# Patient Record
Sex: FEMALE | Race: WHITE | Employment: OTHER | URBAN - METROPOLITAN AREA
[De-identification: names, ages, dates, MRNs, and addresses within clinical notes are randomized per-mention and may not be internally consistent; named-entity substitution may affect disease eponyms.]

---

## 2018-03-02 ENCOUNTER — ANESTHESIA EVENT (OUTPATIENT)
Dept: ENDOSCOPY | Age: 78
End: 2018-03-02
Payer: MEDICARE

## 2018-03-02 RX ORDER — MELOXICAM 15 MG/1
TABLET ORAL
Refills: 3 | COMMUNITY
Start: 2018-02-01

## 2018-03-02 RX ORDER — CIPROFLOXACIN 500 MG/1
TABLET ORAL
COMMUNITY
Start: 2018-02-28 | End: 2018-03-14

## 2018-03-05 ENCOUNTER — ANESTHESIA (OUTPATIENT)
Dept: ENDOSCOPY | Age: 78
End: 2018-03-05
Payer: MEDICARE

## 2018-03-05 ENCOUNTER — HOSPITAL ENCOUNTER (OUTPATIENT)
Age: 78
Setting detail: OUTPATIENT SURGERY
Discharge: HOME | End: 2018-03-05
Attending: INTERNAL MEDICINE | Admitting: INTERNAL MEDICINE
Payer: MEDICARE

## 2018-03-05 VITALS
HEIGHT: 63 IN | WEIGHT: 121 LBS | RESPIRATION RATE: 14 BRPM | SYSTOLIC BLOOD PRESSURE: 105 MMHG | BODY MASS INDEX: 21.44 KG/M2 | DIASTOLIC BLOOD PRESSURE: 55 MMHG | TEMPERATURE: 98.2 F | HEART RATE: 87 BPM | OXYGEN SATURATION: 99 %

## 2018-03-05 PROCEDURE — 88305 TISSUE EXAM BY PATHOLOGIST: CPT | Performed by: INTERNAL MEDICINE

## 2018-03-05 PROCEDURE — 74011250636 HC RX REV CODE- 250/636

## 2018-03-05 PROCEDURE — 77030038604 HC SNR ENDO EXACTO USEN -B: Performed by: INTERNAL MEDICINE

## 2018-03-05 PROCEDURE — 74011000250 HC RX REV CODE- 250

## 2018-03-05 PROCEDURE — 76040000007: Performed by: INTERNAL MEDICINE

## 2018-03-05 PROCEDURE — 74011250636 HC RX REV CODE- 250/636: Performed by: NURSE ANESTHETIST, CERTIFIED REGISTERED

## 2018-03-05 PROCEDURE — 76060000032 HC ANESTHESIA 0.5 TO 1 HR: Performed by: INTERNAL MEDICINE

## 2018-03-05 RX ORDER — SODIUM CHLORIDE, SODIUM LACTATE, POTASSIUM CHLORIDE, CALCIUM CHLORIDE 600; 310; 30; 20 MG/100ML; MG/100ML; MG/100ML; MG/100ML
100 INJECTION, SOLUTION INTRAVENOUS CONTINUOUS
Status: DISCONTINUED | OUTPATIENT
Start: 2018-03-05 | End: 2018-03-05 | Stop reason: HOSPADM

## 2018-03-05 RX ORDER — SODIUM CHLORIDE 0.9 % (FLUSH) 0.9 %
5-10 SYRINGE (ML) INJECTION EVERY 8 HOURS
Status: CANCELLED | OUTPATIENT
Start: 2018-03-05 | End: 2018-03-05

## 2018-03-05 RX ORDER — SODIUM CHLORIDE 0.9 % (FLUSH) 0.9 %
5-10 SYRINGE (ML) INJECTION AS NEEDED
Status: CANCELLED | OUTPATIENT
Start: 2018-03-05 | End: 2018-03-05

## 2018-03-05 RX ORDER — DEXTROMETHORPHAN/PSEUDOEPHED 2.5-7.5/.8
1.2 DROPS ORAL
Status: CANCELLED | OUTPATIENT
Start: 2018-03-05

## 2018-03-05 RX ORDER — LIDOCAINE HYDROCHLORIDE 20 MG/ML
INJECTION, SOLUTION EPIDURAL; INFILTRATION; INTRACAUDAL; PERINEURAL AS NEEDED
Status: DISCONTINUED | OUTPATIENT
Start: 2018-03-05 | End: 2018-03-05 | Stop reason: HOSPADM

## 2018-03-05 RX ORDER — PROPOFOL 10 MG/ML
INJECTION, EMULSION INTRAVENOUS AS NEEDED
Status: DISCONTINUED | OUTPATIENT
Start: 2018-03-05 | End: 2018-03-05 | Stop reason: HOSPADM

## 2018-03-05 RX ADMIN — PROPOFOL 50 MG: 10 INJECTION, EMULSION INTRAVENOUS at 09:18

## 2018-03-05 RX ADMIN — PROPOFOL 50 MG: 10 INJECTION, EMULSION INTRAVENOUS at 09:12

## 2018-03-05 RX ADMIN — PROPOFOL 30 MG: 10 INJECTION, EMULSION INTRAVENOUS at 09:22

## 2018-03-05 RX ADMIN — LIDOCAINE HYDROCHLORIDE 40 MG: 20 INJECTION, SOLUTION EPIDURAL; INFILTRATION; INTRACAUDAL; PERINEURAL at 08:56

## 2018-03-05 RX ADMIN — PROPOFOL 30 MG: 10 INJECTION, EMULSION INTRAVENOUS at 09:01

## 2018-03-05 RX ADMIN — PROPOFOL 50 MG: 10 INJECTION, EMULSION INTRAVENOUS at 09:07

## 2018-03-05 RX ADMIN — PROPOFOL 20 MG: 10 INJECTION, EMULSION INTRAVENOUS at 09:04

## 2018-03-05 RX ADMIN — PROPOFOL 50 MG: 10 INJECTION, EMULSION INTRAVENOUS at 08:56

## 2018-03-05 RX ADMIN — SODIUM CHLORIDE, SODIUM LACTATE, POTASSIUM CHLORIDE, AND CALCIUM CHLORIDE: 600; 310; 30; 20 INJECTION, SOLUTION INTRAVENOUS at 08:52

## 2018-03-05 NOTE — H&P
Date of Surgery Update:  Evelia Villarreal was seen and examined. History and physical has been reviewed. The patient has been examined.  There have been no significant clinical changes since the completion of the originally dated History and Physical.    Signed By: Bryce Freitas MD     March 5, 2018 8:47 AM

## 2018-03-05 NOTE — ANESTHESIA PREPROCEDURE EVALUATION
Anesthetic History   No history of anesthetic complications            Review of Systems / Medical History  Patient summary reviewed and pertinent labs reviewed    Pulmonary  Within defined limits                 Neuro/Psych   Within defined limits           Cardiovascular  Within defined limits                Exercise tolerance: >4 METS     GI/Hepatic/Renal  Within defined limits              Endo/Other      Hypothyroidism: well controlled  Arthritis     Other Findings   Comments: Documentation of current medication  Current medications obtained, documented and obtained? YES      Risk Factors for Postoperative nausea/vomiting:       History of postoperative nausea/vomiting? NO       Female? YES       Motion sickness? NO       Intended opioid administration for postoperative analgesia? NO      Smoking Abstinence:  Current Smoker? NO  Elective Surgery? YES  Seen preoperatively by anesthesiologist or proxy prior to day of surgery? YES  Pt abstained from smoking 24 hours prior to anesthesia?  N/A    Preventive care/screening for High Blood Pressure:  Aged 18 years and older: YES  Screened for high blood pressure: YES  Patients with high blood pressure referred to primary care provider   for BP management: YES                 Physical Exam    Airway  Mallampati: II  TM Distance: 4 - 6 cm  Neck ROM: normal range of motion   Mouth opening: Normal     Cardiovascular  Regular rate and rhythm,  S1 and S2 normal,  no murmur, click, rub, or gallop  Rhythm: regular  Rate: normal         Dental  No notable dental hx       Pulmonary  Breath sounds clear to auscultation               Abdominal  GI exam deferred       Other Findings            Anesthetic Plan    ASA: 2  Anesthesia type: MAC          Induction: Intravenous  Anesthetic plan and risks discussed with: Patient

## 2018-03-05 NOTE — IP AVS SNAPSHOT
Gilberto Bryant 
 
 
 [address] 
[phone] Patient: Kade Arechiga MRN: [id number] TPO:1/52/8339 About your hospitalization You were admitted on:  March 5, 2018 You last received care in the:  [facility] PHASE 2 RECOVERY You were discharged on:  March 5, 2018 Why you were hospitalized Your primary diagnosis was:  Not on File Follow-up Information Follow up With Details Comments Contact Info Stacy Serrano MD   [address] [place] [address] [phone] Your Scheduled Appointments Wednesday March 14, 2018  8:45 AM EDT  
ESTABLISHED PATIENT with Rusty Chaudhry MD  
[facility]. [address] ([facility]) [address] [place] [address]  
[phone] Discharge Orders None A check roly indicates which time of day the medication should be taken. My Medications CONTINUE taking these medications Instructions Each Dose to Equal  
 Morning Noon Evening Bedtime  
 ciprofloxacin HCl 500 mg tablet Commonly known as:  CIPRO Your last dose was: Your next dose is:    
   
   
      
   
   
   
  
 estradiol 0.01 % (0.1 mg/gram) vaginal cream  
Commonly known as:  ESTRACE Your last dose was: Your next dose is:    
   
   
 Apply to affected area 2 x per week at bedtime LEXAPRO 20 mg tablet Generic drug:  escitalopram oxalate Your last dose was: Your next dose is: Take 20 mg by mouth daily. 20 mg  
    
   
   
   
  
 meloxicam 15 mg tablet Commonly known as:  MOBIC Your last dose was: Your next dose is:    
   
   
      
   
   
   
  
 MYRBETRIQ 50 mg ER tablet Generic drug:  mirabegron ER Your last dose was: Your next dose is: TAKE 1 TAB BY MOUTH DAILY. nabumetone 750 mg tablet Commonly known as:  RELAFEN Your last dose was: Your next dose is: Take 750 mg by mouth two (2) times a day. 750 mg Omega-3-DHA-EPA-Fish Oil 1,000 mg (120 mg-180 mg) Cap Your last dose was: Your next dose is: Take  by mouth. RESTORIL 30 mg capsule Generic drug:  temazepam  
   
Your last dose was: Your next dose is: Take  by mouth nightly as needed. SYNTHROID 100 mcg tablet Generic drug:  levothyroxine Your last dose was: Your next dose is: Take  by mouth Daily (before breakfast). ZOCOR PO Your last dose was: Your next dose is: Take  by mouth. Discharge Instructions For the next 12 hours you should not: 1. drive 2. drink alcohol 3. operate any machinery 4. engage in activities that require mental sharpness or manual dexterity such as   
 cooking 5. take any drugs other than those prescribed by a physician 6. make any legal or financial decisions Call your doctor's office immediately, if: 
 1. increased and continuing rectal bleeding 2. fever 3. increased abdominal pain Take it easy today and resume normal activity tomorrow. Resume previous diet. DISCHARGE SUMMARY from Nurse PATIENT INSTRUCTIONS: 
 
After general anesthesia or intravenous sedation, for 24 hours or while taking prescription Narcotics: · Limit your activities · Do not drive and operate hazardous machinery · Do not make important personal or business decisions · Do  not drink alcoholic beverages · If you have not urinated within 8 hours after discharge, please contact your surgeon on call. Report the following to your surgeon: 
· Excessive pain, swelling, redness or odor of or around the surgical area · Temperature over 100.5 · Nausea and vomiting lasting longer than 4 hours or if unable to take medications · Any signs of decreased circulation or nerve impairment to extremity: change in color, persistent  numbness, tingling, coldness or increase pain · Any questions What to do at Home: These are general instructions for a healthy lifestyle: No smoking/ No tobacco products/ Avoid exposure to second hand smoke Surgeon General's Warning:  Quitting smoking now greatly reduces serious risk to your health. Obesity, smoking, and sedentary lifestyle greatly increases your risk for illness A healthy diet, regular physical exercise & weight monitoring are important for maintaining a healthy lifestyle You may be retaining fluid if you have a history of heart failure or if you experience any of the following symptoms:  Weight gain of 3 pounds or more overnight or 5 pounds in a week, increased swelling in our hands or feet or shortness of breath while lying flat in bed. Please call your doctor as soon as you notice any of these symptoms; do not wait until your next office visit. Recognize signs and symptoms of STROKE: 
 
F-face looks uneven A-arms unable to move or move unevenly S-speech slurred or non-existent T-time-call 911 as soon as signs and symptoms begin-DO NOT go Back to bed or wait to see if you get better-TIME IS BRAIN. Warning Signs of HEART ATTACK Call 911 if you have these symptoms: 
? Chest discomfort. Most heart attacks involve discomfort in the center of the chest that lasts more than a few minutes, or that goes away and comes back. It can feel like uncomfortable pressure, squeezing, fullness, or pain. ? Discomfort in other areas of the upper body. Symptoms can include pain or discomfort in one or both arms, the back, neck, jaw, or stomach. ? Shortness of breath with or without chest discomfort. ? Other signs may include breaking out in a cold sweat, nausea, or lightheadedness. Don't wait more than five minutes to call [address]! Fast action can save your life. Calling 911 is almost always the fastest way to get lifesaving treatment. Emergency Medical Services staff can begin treatment when they arrive  up to an hour sooner than if someone gets to the hospital by car. The discharge information has been reviewed with the patient. The patient verbalized understanding. Discharge medications reviewed with the patient and appropriate educational materials and side effects teaching were provided. ___________________________________________________________________________________________________________________________________ Patient armband removed and given to patient to take home. Patient was informed of the privacy risks if armband lost or stolen Introducing [facility]! [employer] introduces [employer] patient portal. Now you can access parts of your medical record, email your doctor's office, and request medication refills online. 1. In your internet browser, go to [email or web address] 2. Click on the First Time User? Click Here link in the Sign In box. You will see the New Member Sign Up page. 3. Enter your Trust[employer] Access Code exactly as it appears below. You will not need to use this code after youve completed the sign-up process. If you do not sign up before the expiration date, you must request a new code. · Trust[employer] Access Code: [email or web address] Expires: 5/31/2018  2:05 PM 
 
4. Enter the last four digits of your Social Security Number (xxxx) and Date of Birth (mm/dd/yyyy) as indicated and click Submit. You will be taken to the next sign-up page. 5. Create a [employer] ID. This will be your [employer] login ID and cannot be changed, so think of one that is secure and easy to remember. 6. Create a TrustCloud password. You can change your password at any time. 7. Enter your Password Reset Question and Answer. This can be used at a later time if you forget your password. 8. Enter your e-mail address. You will receive e-mail notification when new information is available in [address] 9. Click Sign Up. You can now view and download portions of your medical record. 10. Click the Download Summary menu link to download a portable copy of your medical information. If you have questions, please visit the Frequently Asked Questions section of the "[employer] website. Remember, "[employer] is NOT to be used for urgent needs. For medical emergencies, dial 911. Now available from your iPhone and Android! Providers Seen During Your Hospitalization Provider Specialty Primary office phone Bushra Emanuel MD Gastroenterology [phone] Your Primary Care Physician (PCP) Primary Care Physician Office Phone Office Fax Liane Slois [phone] You are allergic to the following Allergen Reactions Sulfa (Sulfonamide Antibiotics) Itching Recent Documentation Height Weight BMI OB Status Smoking Status 1.6 m 54.9 kg 21.43 kg/m2 Hysterectomy Never Smoker Emergency Contacts Name Discharge Info Relation Home Work Mobile [employer] DISCHARGE CAREGIVER [3] Spouse [3] [phone] Patient Belongings The following personal items are in your possession at time of discharge: 
  Dental Appliances: None  Visual Aid: None Please provide this summary of care documentation to your next provider. Signatures-by signing, you are acknowledging that this After Visit Summary has been reviewed with you and you have received a copy. Patient Signature:  ____________________________________________________________ Date:  ____________________________________________________________  
  
Natalia Sleeper  Provider Signature: ____________________________________________________________ Date:  ____________________________________________________________

## 2018-03-05 NOTE — DISCHARGE INSTRUCTIONS
For the next 12 hours you should not:   1. drive   2. drink alcohol   3. operate any machinery   4. engage in activities that require mental sharpness or manual dexterity such as     cooking   5. take any drugs other than those prescribed by a physician   6. make any legal or financial decisions  Call your doctor's office immediately, if:   1. increased and continuing rectal bleeding   2. fever   3. increased abdominal pain    Take it easy today and resume normal activity tomorrow. Resume previous diet. DISCHARGE SUMMARY from Nurse    PATIENT INSTRUCTIONS:    After general anesthesia or intravenous sedation, for 24 hours or while taking prescription Narcotics:  · Limit your activities  · Do not drive and operate hazardous machinery  · Do not make important personal or business decisions  · Do  not drink alcoholic beverages  · If you have not urinated within 8 hours after discharge, please contact your surgeon on call. Report the following to your surgeon:  · Excessive pain, swelling, redness or odor of or around the surgical area  · Temperature over 100.5  · Nausea and vomiting lasting longer than 4 hours or if unable to take medications  · Any signs of decreased circulation or nerve impairment to extremity: change in color, persistent  numbness, tingling, coldness or increase pain  · Any questions    What to do at Home:  These are general instructions for a healthy lifestyle:    No smoking/ No tobacco products/ Avoid exposure to second hand smoke  Surgeon General's Warning:  Quitting smoking now greatly reduces serious risk to your health.     Obesity, smoking, and sedentary lifestyle greatly increases your risk for illness    A healthy diet, regular physical exercise & weight monitoring are important for maintaining a healthy lifestyle    You may be retaining fluid if you have a history of heart failure or if you experience any of the following symptoms:  Weight gain of 3 pounds or more overnight or 5 pounds in a week, increased swelling in our hands or feet or shortness of breath while lying flat in bed. Please call your doctor as soon as you notice any of these symptoms; do not wait until your next office visit. Recognize signs and symptoms of STROKE:    F-face looks uneven    A-arms unable to move or move unevenly    S-speech slurred or non-existent    T-time-call 911 as soon as signs and symptoms begin-DO NOT go       Back to bed or wait to see if you get better-TIME IS BRAIN. Warning Signs of HEART ATTACK     Call 911 if you have these symptoms:   Chest discomfort. Most heart attacks involve discomfort in the center of the chest that lasts more than a few minutes, or that goes away and comes back. It can feel like uncomfortable pressure, squeezing, fullness, or pain.  Discomfort in other areas of the upper body. Symptoms can include pain or discomfort in one or both arms, the back, neck, jaw, or stomach.  Shortness of breath with or without chest discomfort.  Other signs may include breaking out in a cold sweat, nausea, or lightheadedness. Don't wait more than five minutes to call 911 - MINUTES MATTER! Fast action can save your life. Calling 911 is almost always the fastest way to get lifesaving treatment. Emergency Medical Services staff can begin treatment when they arrive -- up to an hour sooner than if someone gets to the hospital by car. The discharge information has been reviewed with the patient. The patient verbalized understanding. Discharge medications reviewed with the patient and appropriate educational materials and side effects teaching were provided. ___________________________________________________________________________________________________________________________________  Patient armband removed and given to patient to take home.   Patient was informed of the privacy risks if armband lost or stolen

## 2018-03-05 NOTE — PROCEDURES
Colonoscopy Report    Patient: Guy Valentin MRN: [id number]  SSN: [id number]    YOB: 1940  Age: 69 y.o. Sex: female      Date of Procedure: 3/5/2018    IMPRESSION:  1. 6 mm polyp vs. granulation tissue at 15 cm -snared off  2. diverticulosis,  Moderate in degree, involving the descending colon and the sigmoid  3. normal colonic mucosa throughout  4. Normal terminal ileum    RECOMMENDATIONS:  1. Await pathology. 2. Adenoma is present, repeat colonoscopy in 5 years. 3. Call in 2 weeks    Indication:  Constipation, Screening colonoscopy  Procedure Performed: Colonoscopy polypectomy (cold snare)  Endoscopist: Catalino Shook MD  Assistant: Endoscopy Technician-1: Shanell [facility]  Endoscopy RN-1: Wally Hector RN  ASA: ASA 2 - Patient with mild systemic disease with no functional limitations  Mallampati Score: II (soft palate, uvula, fauces visible)  Anesthesia: MAC anesthesia  Endoscope: [id number]  Extent of Examination:terminal ileum  Quality of Preparation:     []  Excellent   [x]  Very Good   [] Fair but adequate   [] Fair, inadequate   []  Poor      Technique: The procedure was discussed with the patient including risks, benefits, alternatives including risks of IV sedation, bleeding, perforation and missed polyp. A safety timeout was performed. The patient was given incremental doses of Versed and Demerol to achieve moderate conscious sedation. The patients vital signs were monitored at all times including heart rate, rhythm, blood pressure and oxygen saturation. The patient was placed in left lateral position. When adequate sedation was achieved a perianal inspection and a digital rectal exam were performed. Video colonoscope was introduced into the rectum and advanced through a tortuous colon under direct vision up to the terminal ileum. The cecum was identified by IC valve, appendiceal orifice and crows foot.  With adequate insufflation and maneuvering of the withdrawing scope, the colonic mucosa was visualized carefully. Retroflexion was performed in the rectum and the distal rectum visualized. The patient tolerated the procedure very well and was transferred to recovery area. Findings:  1. Polyp vs granulation tissue at 15 cm -cold snare removed  2. Moderate colonic diverticulosis involving  the descending colon and the sigmoid   3. Otherwise normal colonoscopy to the terminal ileum.   EBL: Minimal  Specimen:   ID Type Source Tests Collected by Time Destination   1 : (cold snare) Polyp at Sophia Son MD 3/5/2018 9882 Pathology       Asiya Orantes MD, [place]  March 5, 2018  9:26 AM

## 2018-03-05 NOTE — ANESTHESIA POSTPROCEDURE EVALUATION
Post-Anesthesia Evaluation and Assessment    Patient: Ila Morales MRN: [id number]  SSN: [id number]    YOB: 1940  Age: 69 y.o. Sex: female      Data from PACU flowsheet    Cardiovascular Function/Vital Signs  Visit Vitals    /55    Pulse 87    Temp 36.8 °C (98.2 °F)    Resp 14    Ht 5' 3\" (1.6 m)    Wt 54.9 kg (121 lb)    SpO2 99%    BMI 21.43 kg/m2       Patient is status post MAC anesthesia for Procedure(s):  COLONOSCOPY. Nausea/Vomiting: controlled    Postoperative hydration reviewed and adequate. Pain:  Pain Scale 1: Numeric (0 - 10) (03/05/18 0929)  Pain Intensity 1: 0 (03/05/18 0929)   Managed      Mental Status and Level of Consciousness: Alert and oriented     Pulmonary Status:   O2 Device: Room air (03/05/18 0930)   Adequate oxygenation and airway patent    Complications related to anesthesia: None    Post-anesthesia assessment completed.  No concerns    Signed By: Brennon Kaur MD     March 5, 2018

## 2023-03-18 LAB
ALANINE AMINOTRANSFERASE (SGPT) (U/L) IN SER/PLAS: 33 U/L (ref 7–45)
ALBUMIN (G/DL) IN SER/PLAS: 4.1 G/DL (ref 3.4–5)
ALKALINE PHOSPHATASE (U/L) IN SER/PLAS: 91 U/L (ref 33–136)
ANION GAP IN SER/PLAS: 17 MMOL/L (ref 10–20)
ASPARTATE AMINOTRANSFERASE (SGOT) (U/L) IN SER/PLAS: 28 U/L (ref 9–39)
BASOPHILS (10*3/UL) IN BLOOD BY AUTOMATED COUNT: 0.07 X10E9/L (ref 0–0.1)
BASOPHILS/100 LEUKOCYTES IN BLOOD BY AUTOMATED COUNT: 1 % (ref 0–2)
BILIRUBIN TOTAL (MG/DL) IN SER/PLAS: 0.8 MG/DL (ref 0–1.2)
CALCIDIOL (25 OH VITAMIN D3) (NG/ML) IN SER/PLAS: 36 NG/ML
CALCIUM (MG/DL) IN SER/PLAS: 9.3 MG/DL (ref 8.6–10.3)
CARBON DIOXIDE, TOTAL (MMOL/L) IN SER/PLAS: 25 MMOL/L (ref 21–32)
CHLORIDE (MMOL/L) IN SER/PLAS: 100 MMOL/L (ref 98–107)
CHOLESTEROL (MG/DL) IN SER/PLAS: 159 MG/DL (ref 0–199)
CHOLESTEROL IN HDL (MG/DL) IN SER/PLAS: 70.2 MG/DL
CHOLESTEROL IN LDL (MG/DL) IN SER/PLAS BY DIRECT ASSAY: 75 MG/DL (ref 0–129)
CHOLESTEROL/HDL RATIO: 2.3
COBALAMIN (VITAMIN B12) (PG/ML) IN SER/PLAS: 1388 PG/ML (ref 211–911)
CREATININE (MG/DL) IN SER/PLAS: 1.04 MG/DL (ref 0.5–1.05)
EOSINOPHILS (10*3/UL) IN BLOOD BY AUTOMATED COUNT: 0.14 X10E9/L (ref 0–0.4)
EOSINOPHILS/100 LEUKOCYTES IN BLOOD BY AUTOMATED COUNT: 2.1 % (ref 0–6)
ERYTHROCYTE DISTRIBUTION WIDTH (RATIO) BY AUTOMATED COUNT: 15.1 % (ref 11.5–14.5)
ERYTHROCYTE MEAN CORPUSCULAR HEMOGLOBIN CONCENTRATION (G/DL) BY AUTOMATED: 30.6 G/DL (ref 32–36)
ERYTHROCYTE MEAN CORPUSCULAR VOLUME (FL) BY AUTOMATED COUNT: 80 FL (ref 80–100)
ERYTHROCYTES (10*6/UL) IN BLOOD BY AUTOMATED COUNT: 5.09 X10E12/L (ref 4–5.2)
ESTIMATED AVERAGE GLUCOSE FOR HBA1C: 140 MG/DL
GFR FEMALE: 54 ML/MIN/1.73M2
GLUCOSE (MG/DL) IN SER/PLAS: 161 MG/DL (ref 74–99)
HEMATOCRIT (%) IN BLOOD BY AUTOMATED COUNT: 40.9 % (ref 36–46)
HEMOGLOBIN (G/DL) IN BLOOD: 12.5 G/DL (ref 12–16)
HEMOGLOBIN A1C/HEMOGLOBIN TOTAL IN BLOOD: 6.5 %
IMMATURE GRANULOCYTES/100 LEUKOCYTES IN BLOOD BY AUTOMATED COUNT: 0.3 % (ref 0–0.9)
LDL: 54 MG/DL (ref 0–99)
LEUKOCYTES (10*3/UL) IN BLOOD BY AUTOMATED COUNT: 6.7 X10E9/L (ref 4.4–11.3)
LYMPHOCYTES (10*3/UL) IN BLOOD BY AUTOMATED COUNT: 1.38 X10E9/L (ref 0.8–3)
LYMPHOCYTES/100 LEUKOCYTES IN BLOOD BY AUTOMATED COUNT: 20.6 % (ref 13–44)
MONOCYTES (10*3/UL) IN BLOOD BY AUTOMATED COUNT: 0.67 X10E9/L (ref 0.05–0.8)
MONOCYTES/100 LEUKOCYTES IN BLOOD BY AUTOMATED COUNT: 10 % (ref 2–10)
NEUTROPHILS (10*3/UL) IN BLOOD BY AUTOMATED COUNT: 4.43 X10E9/L (ref 1.6–5.5)
NEUTROPHILS/100 LEUKOCYTES IN BLOOD BY AUTOMATED COUNT: 66 % (ref 40–80)
PLATELETS (10*3/UL) IN BLOOD AUTOMATED COUNT: 339 X10E9/L (ref 150–450)
POTASSIUM (MMOL/L) IN SER/PLAS: 3.5 MMOL/L (ref 3.5–5.3)
PROTEIN TOTAL: 6.7 G/DL (ref 6.4–8.2)
SODIUM (MMOL/L) IN SER/PLAS: 138 MMOL/L (ref 136–145)
THYROTROPIN (MIU/L) IN SER/PLAS BY DETECTION LIMIT <= 0.05 MIU/L: 2.71 MIU/L (ref 0.44–3.98)
TRIGLYCERIDE (MG/DL) IN SER/PLAS: 172 MG/DL (ref 0–149)
UREA NITROGEN (MG/DL) IN SER/PLAS: 21 MG/DL (ref 6–23)
VLDL: 34 MG/DL (ref 0–40)

## 2023-07-17 LAB
ALANINE AMINOTRANSFERASE (SGPT) (U/L) IN SER/PLAS: 26 U/L (ref 7–45)
ALBUMIN (G/DL) IN SER/PLAS: 3.9 G/DL (ref 3.4–5)
ALKALINE PHOSPHATASE (U/L) IN SER/PLAS: 81 U/L (ref 33–136)
ANION GAP IN SER/PLAS: 15 MMOL/L (ref 10–20)
ASPARTATE AMINOTRANSFERASE (SGOT) (U/L) IN SER/PLAS: 24 U/L (ref 9–39)
BASOPHILS (10*3/UL) IN BLOOD BY AUTOMATED COUNT: 0.07 X10E9/L (ref 0–0.1)
BASOPHILS/100 LEUKOCYTES IN BLOOD BY AUTOMATED COUNT: 0.8 % (ref 0–2)
BILIRUBIN TOTAL (MG/DL) IN SER/PLAS: 0.7 MG/DL (ref 0–1.2)
CALCIDIOL (25 OH VITAMIN D3) (NG/ML) IN SER/PLAS: 36 NG/ML
CALCIUM (MG/DL) IN SER/PLAS: 8.7 MG/DL (ref 8.6–10.3)
CARBON DIOXIDE, TOTAL (MMOL/L) IN SER/PLAS: 26 MMOL/L (ref 21–32)
CHLORIDE (MMOL/L) IN SER/PLAS: 101 MMOL/L (ref 98–107)
CHOLESTEROL (MG/DL) IN SER/PLAS: 156 MG/DL (ref 0–199)
CHOLESTEROL IN HDL (MG/DL) IN SER/PLAS: 63.9 MG/DL
CHOLESTEROL IN LDL (MG/DL) IN SER/PLAS BY DIRECT ASSAY: 73 MG/DL (ref 0–129)
CHOLESTEROL/HDL RATIO: 2.4
COBALAMIN (VITAMIN B12) (PG/ML) IN SER/PLAS: 1097 PG/ML (ref 211–911)
CREATININE (MG/DL) IN SER/PLAS: 0.95 MG/DL (ref 0.5–1.05)
ERYTHROCYTE DISTRIBUTION WIDTH (RATIO) BY AUTOMATED COUNT: 15.1 % (ref 11.5–14.5)
ERYTHROCYTE MEAN CORPUSCULAR HEMOGLOBIN CONCENTRATION (G/DL) BY AUTOMATED: 31.2 G/DL (ref 32–36)
ERYTHROCYTE MEAN CORPUSCULAR VOLUME (FL) BY AUTOMATED COUNT: 80 FL (ref 80–100)
ERYTHROCYTES (10*6/UL) IN BLOOD BY AUTOMATED COUNT: 5.02 X10E12/L (ref 4–5.2)
ESTIMATED AVERAGE GLUCOSE FOR HBA1C: 140 MG/DL
GFR FEMALE: 60 ML/MIN/1.73M2
GLUCOSE (MG/DL) IN SER/PLAS: 139 MG/DL (ref 74–99)
HEMATOCRIT (%) IN BLOOD BY AUTOMATED COUNT: 40.1 % (ref 36–46)
HEMOGLOBIN (G/DL) IN BLOOD: 12.5 G/DL (ref 12–16)
HEMOGLOBIN A1C/HEMOGLOBIN TOTAL IN BLOOD: 6.5 %
IMMATURE GRANULOCYTES/100 LEUKOCYTES IN BLOOD BY AUTOMATED COUNT: 0.2 % (ref 0–0.9)
LDL: 62 MG/DL (ref 0–99)
LEUKOCYTES (10*3/UL) IN BLOOD BY AUTOMATED COUNT: 8.5 X10E9/L (ref 4.4–11.3)
LYMPHOCYTES (10*3/UL) IN BLOOD BY AUTOMATED COUNT: 1.1 X10E9/L (ref 0.8–3)
LYMPHOCYTES/100 LEUKOCYTES IN BLOOD BY AUTOMATED COUNT: 12.9 % (ref 13–44)
MONOCYTES (10*3/UL) IN BLOOD BY AUTOMATED COUNT: 0.61 X10E9/L (ref 0.05–0.8)
MONOCYTES/100 LEUKOCYTES IN BLOOD BY AUTOMATED COUNT: 7.2 % (ref 2–10)
NEUTROPHILS (10*3/UL) IN BLOOD BY AUTOMATED COUNT: 6.7 X10E9/L (ref 1.6–5.5)
NEUTROPHILS/100 LEUKOCYTES IN BLOOD BY AUTOMATED COUNT: 78.9 % (ref 40–80)
PLATELETS (10*3/UL) IN BLOOD AUTOMATED COUNT: 326 X10E9/L (ref 150–450)
POTASSIUM (MMOL/L) IN SER/PLAS: 3.4 MMOL/L (ref 3.5–5.3)
PROTEIN TOTAL: 6.7 G/DL (ref 6.4–8.2)
SODIUM (MMOL/L) IN SER/PLAS: 139 MMOL/L (ref 136–145)
THYROTROPIN (MIU/L) IN SER/PLAS BY DETECTION LIMIT <= 0.05 MIU/L: 1.85 MIU/L (ref 0.44–3.98)
TRIGLYCERIDE (MG/DL) IN SER/PLAS: 153 MG/DL (ref 0–149)
UREA NITROGEN (MG/DL) IN SER/PLAS: 19 MG/DL (ref 6–23)
VLDL: 31 MG/DL (ref 0–40)

## 2023-07-27 LAB
ALBUMIN (MG/L) IN URINE: <7 MG/L
ALBUMIN/CREATININE (UG/MG) IN URINE: NORMAL UG/MG CRT (ref 0–30)
APPEARANCE, URINE: CLEAR
BACTERIA, URINE: ABNORMAL /HPF
BILIRUBIN, URINE: NEGATIVE
BLOOD, URINE: NEGATIVE
COLOR, URINE: YELLOW
CREATININE (MG/DL) IN URINE: 82.4 MG/DL (ref 20–320)
GLUCOSE, URINE: NEGATIVE MG/DL
HYALINE CASTS, URINE: ABNORMAL /LPF
KETONES, URINE: NEGATIVE MG/DL
LEUKOCYTE ESTERASE, URINE: ABNORMAL
MUCUS, URINE: ABNORMAL /LPF
NITRITE, URINE: NEGATIVE
PH, URINE: 6 (ref 5–8)
PROTEIN, URINE: NEGATIVE MG/DL
RBC, URINE: 1 /HPF (ref 0–5)
SPECIFIC GRAVITY, URINE: 1.01 (ref 1–1.03)
SQUAMOUS EPITHELIAL CELLS, URINE: 1 /HPF
UROBILINOGEN, URINE: <2 MG/DL (ref 0–1.9)
WBC, URINE: 1 /HPF (ref 0–5)

## 2023-10-23 ENCOUNTER — LAB (OUTPATIENT)
Dept: LAB | Facility: LAB | Age: 83
End: 2023-10-23
Payer: MEDICARE

## 2023-10-23 DIAGNOSIS — I10 ESSENTIAL (PRIMARY) HYPERTENSION: ICD-10-CM

## 2023-10-23 DIAGNOSIS — E78.5 HYPERLIPIDEMIA, UNSPECIFIED: ICD-10-CM

## 2023-10-23 DIAGNOSIS — E78.5 HYPERLIPIDEMIA, UNSPECIFIED: Primary | ICD-10-CM

## 2023-10-23 LAB
ALBUMIN SERPL BCP-MCNC: 3.9 G/DL (ref 3.4–5)
ALP SERPL-CCNC: 84 U/L (ref 33–136)
ALT SERPL W P-5'-P-CCNC: 26 U/L (ref 7–45)
ANION GAP SERPL CALC-SCNC: 13 MMOL/L (ref 10–20)
AST SERPL W P-5'-P-CCNC: 23 U/L (ref 9–39)
BILIRUB SERPL-MCNC: 0.6 MG/DL (ref 0–1.2)
BUN SERPL-MCNC: 21 MG/DL (ref 6–23)
CALCIUM SERPL-MCNC: 9.5 MG/DL (ref 8.6–10.3)
CHLORIDE SERPL-SCNC: 103 MMOL/L (ref 98–107)
CHOLEST SERPL-MCNC: 145 MG/DL (ref 0–199)
CHOLESTEROL/HDL RATIO: 2.4
CO2 SERPL-SCNC: 29 MMOL/L (ref 21–32)
CREAT SERPL-MCNC: 1.04 MG/DL (ref 0.5–1.05)
GFR SERPL CREATININE-BSD FRML MDRD: 54 ML/MIN/1.73M*2
GLUCOSE SERPL-MCNC: 136 MG/DL (ref 74–99)
HDLC SERPL-MCNC: 60.4 MG/DL
LDLC SERPL CALC-MCNC: 53 MG/DL
NON HDL CHOLESTEROL: 85 MG/DL (ref 0–149)
POTASSIUM SERPL-SCNC: 3.9 MMOL/L (ref 3.5–5.3)
PROT SERPL-MCNC: 6.7 G/DL (ref 6.4–8.2)
SODIUM SERPL-SCNC: 141 MMOL/L (ref 136–145)
TRIGL SERPL-MCNC: 157 MG/DL (ref 0–149)
VLDL: 31 MG/DL (ref 0–40)

## 2023-10-23 PROCEDURE — 80061 LIPID PANEL: CPT

## 2023-10-23 PROCEDURE — 36415 COLL VENOUS BLD VENIPUNCTURE: CPT

## 2023-10-23 PROCEDURE — 80053 COMPREHEN METABOLIC PANEL: CPT

## 2023-10-30 ENCOUNTER — TELEPHONE (OUTPATIENT)
Dept: CARDIOLOGY | Facility: CLINIC | Age: 83
End: 2023-10-30
Payer: MEDICARE

## 2023-10-30 NOTE — TELEPHONE ENCOUNTER
10/30/23  Pt. Left vm asking for results of recent labs from Dr. Naqvi.  Pritned along with this note and placed on physician's desk.  Jesse

## 2023-11-24 ENCOUNTER — HOSPITAL ENCOUNTER (OUTPATIENT)
Dept: ULTRASOUND IMAGING | Age: 83
Discharge: HOME | End: 2023-11-24
Payer: COMMERCIAL

## 2023-11-24 DIAGNOSIS — R94.4 ABNORMAL RENAL FUNCTION TEST: ICD-10-CM

## 2023-11-24 PROCEDURE — 76775 US EXAM ABDO BACK WALL LIM: CPT

## 2023-12-05 PROBLEM — I63.9 CVA (CEREBRAL VASCULAR ACCIDENT) (MULTI): Status: ACTIVE | Noted: 2023-12-05

## 2023-12-05 PROBLEM — R42 LIGHTHEADED: Status: ACTIVE | Noted: 2022-06-29

## 2023-12-05 PROBLEM — E87.6 HYPOKALEMIA: Status: ACTIVE | Noted: 2023-12-05

## 2023-12-05 PROBLEM — R42 VERTIGO: Status: ACTIVE | Noted: 2022-06-29

## 2023-12-05 PROBLEM — E66.9 OBESITY: Status: ACTIVE | Noted: 2023-12-05

## 2023-12-05 PROBLEM — H90.3 BILATERAL SENSORINEURAL HEARING LOSS: Status: ACTIVE | Noted: 2023-12-05

## 2023-12-05 PROBLEM — H91.13 PRESBYCUSIS, BILATERAL: Status: ACTIVE | Noted: 2022-06-29

## 2023-12-05 PROBLEM — I10 BENIGN ESSENTIAL HYPERTENSION: Status: ACTIVE | Noted: 2023-12-05

## 2023-12-05 PROBLEM — H81.10 BENIGN PAROXYSMAL POSITIONAL VERTIGO: Status: ACTIVE | Noted: 2023-12-05

## 2023-12-05 PROBLEM — M54.2 CERVICALGIA: Status: ACTIVE | Noted: 2022-06-29

## 2023-12-05 PROBLEM — I65.23 BILATERAL CAROTID ARTERY STENOSIS: Status: ACTIVE | Noted: 2023-12-05

## 2023-12-05 PROBLEM — R00.1 BRADYCARDIA: Status: ACTIVE | Noted: 2023-12-05

## 2023-12-05 PROBLEM — I51.7 LVH (LEFT VENTRICULAR HYPERTROPHY): Status: ACTIVE | Noted: 2023-12-05

## 2023-12-05 PROBLEM — I25.10 CAD S/P PERCUTANEOUS CORONARY ANGIOPLASTY: Status: ACTIVE | Noted: 2023-12-05

## 2023-12-05 PROBLEM — H93.13 TINNITUS, BILATERAL: Status: ACTIVE | Noted: 2022-06-29

## 2023-12-05 PROBLEM — H93.8X9 EAR PRESSURE: Status: ACTIVE | Noted: 2022-06-29

## 2023-12-05 PROBLEM — E78.5 DYSLIPIDEMIA: Status: ACTIVE | Noted: 2023-12-05

## 2023-12-05 PROBLEM — I95.1 POSTURAL HYPOTENSION: Status: ACTIVE | Noted: 2023-12-05

## 2023-12-05 PROBLEM — Z98.61 CAD S/P PERCUTANEOUS CORONARY ANGIOPLASTY: Status: ACTIVE | Noted: 2023-12-05

## 2023-12-05 PROBLEM — H61.23 BILATERAL IMPACTED CERUMEN: Status: ACTIVE | Noted: 2023-12-05

## 2023-12-05 RX ORDER — CLONAZEPAM 0.5 MG/1
TABLET ORAL
COMMUNITY

## 2023-12-05 RX ORDER — OMEPRAZOLE 20 MG/1
20 TABLET, DELAYED RELEASE ORAL
COMMUNITY
Start: 2022-06-29

## 2023-12-05 RX ORDER — DOXYCYCLINE 100 MG/1
100 CAPSULE ORAL 2 TIMES DAILY
COMMUNITY
Start: 2023-08-21

## 2023-12-05 RX ORDER — ATENOLOL 25 MG/1
25 TABLET ORAL 2 TIMES DAILY
COMMUNITY

## 2023-12-05 RX ORDER — ICOSAPENT ETHYL 1 G/1
CAPSULE ORAL 2 TIMES DAILY
COMMUNITY
Start: 2022-08-24

## 2023-12-05 RX ORDER — FLUOXETINE HYDROCHLORIDE 40 MG/1
40 CAPSULE ORAL DAILY
COMMUNITY

## 2023-12-05 RX ORDER — ATORVASTATIN CALCIUM 20 MG/1
TABLET, FILM COATED ORAL
COMMUNITY
Start: 2006-10-06 | End: 2024-05-16 | Stop reason: SDUPTHER

## 2023-12-05 RX ORDER — TRAZODONE HYDROCHLORIDE 50 MG/1
50 TABLET ORAL NIGHTLY
COMMUNITY

## 2023-12-05 RX ORDER — OFLOXACIN 3 MG/ML
5 SOLUTION AURICULAR (OTIC)
COMMUNITY
Start: 2023-07-03

## 2023-12-05 RX ORDER — POTASSIUM CHLORIDE 750 MG/1
10 TABLET, EXTENDED RELEASE ORAL 2 TIMES DAILY
COMMUNITY

## 2023-12-05 RX ORDER — FLUTICASONE PROPIONATE 50 MCG
SPRAY, SUSPENSION (ML) NASAL
COMMUNITY

## 2023-12-05 RX ORDER — CLOPIDOGREL BISULFATE 75 MG/1
75 TABLET ORAL DAILY
COMMUNITY

## 2023-12-05 RX ORDER — LEVOFLOXACIN 500 MG/1
500 TABLET, FILM COATED ORAL DAILY
COMMUNITY
Start: 2023-08-14

## 2023-12-05 RX ORDER — FENOFIBRATE 54 MG/1
1 TABLET ORAL DAILY
COMMUNITY
Start: 2022-08-30

## 2023-12-05 RX ORDER — AMLODIPINE BESYLATE 10 MG/1
10 TABLET ORAL DAILY
COMMUNITY
Start: 2017-11-17

## 2023-12-05 RX ORDER — AZITHROMYCIN 250 MG/1
TABLET, FILM COATED ORAL
COMMUNITY
Start: 2023-08-04

## 2023-12-05 RX ORDER — MOLNUPIRAVIR 200 MG/1
CAPSULE ORAL
COMMUNITY
Start: 2023-08-07

## 2023-12-05 RX ORDER — HYDROCHLOROTHIAZIDE 25 MG/1
25 TABLET ORAL DAILY
COMMUNITY

## 2024-01-08 ENCOUNTER — EVALUATION (OUTPATIENT)
Dept: PHYSICAL THERAPY | Facility: CLINIC | Age: 84
End: 2024-01-08
Payer: MEDICARE

## 2024-01-08 DIAGNOSIS — H81.10 BENIGN PAROXYSMAL POSITIONAL VERTIGO, UNSPECIFIED LATERALITY: Primary | ICD-10-CM

## 2024-01-08 DIAGNOSIS — R42 VERTIGO: ICD-10-CM

## 2024-01-08 PROCEDURE — 97161 PT EVAL LOW COMPLEX 20 MIN: CPT | Performed by: PHYSICAL THERAPIST

## 2024-01-08 PROCEDURE — 97110 THERAPEUTIC EXERCISES: CPT | Performed by: PHYSICAL THERAPIST

## 2024-01-08 ASSESSMENT — PAIN - FUNCTIONAL ASSESSMENT: PAIN_FUNCTIONAL_ASSESSMENT: 0-10

## 2024-01-08 ASSESSMENT — ENCOUNTER SYMPTOMS
OCCASIONAL FEELINGS OF UNSTEADINESS: 1
LOSS OF SENSATION IN FEET: 0
DEPRESSION: 0

## 2024-01-08 ASSESSMENT — PAIN SCALES - GENERAL: PAINLEVEL_OUTOF10: 0 - NO PAIN

## 2024-01-08 NOTE — LETTER
January 8, 2024    Alyssa Awad MD  [address]  [address] [address]    Patient: Stefany France   YOB: 1940   Date of Visit: 1/8/2024       Dear No referring provider defined for this encounter.    The attached plan of care is being sent to you because your patient’s medical reimbursement requires that you certify the plan of care. Your signature is required to allow uninterrupted insurance coverage.      You may indicate your approval by signing below and faxing this form back to us at Dept Fax: [phone].    Please call Dept: [phone] with any questions or concerns.    Thank you for this referral,        Liborio Son, PT  DO [address] [place] [id number]    Payer: Payor: CIGNA MEDICARE / Plan: C[employer] MEDICARE / Product Type: *No Product type* /                                                                         Date:     Dear Liborio Son PT,     Re: Ms. Stefany France, MRN:[id number]    I certify that I have reviewed the attached plan of care and it is medically necessary for Ms. Stefany France (1940) who is under my care.          ______________________________________                    _________________  Provider name and credentials                                           Date and time                                                                                           Plan of Care 1/8/24   Effective from: 1/8/2024  Effective to: 4/7/2024    Plan ID: [id number]            Participants as of Finalize on 1/8/2024    Name Type Comments Contact Info    Alyssa Awad MD PCP - General  [phone]       Last Plan Note     Author: Liborio Son PT Status: Incomplete Last edited: 1/8/2024  4:00 PM       Physical Therapy    Physical Therapy Evaluation and Treatment      Patient Name: Stefany France  MRN: [id number]  Today's Date: 1/8/2024       Assessment:  This 82 yo pleasant female is present today  with the diagnosis of vertigo.  She reports being seen in the past from a therapist at [facility] who is no longer there.  She had vertigo episode on 12/19/23, waking up in the morning laying on her Lt side.  Vertigo lasted less than 1 minute.  No neck pain.  Cervical AROM WFL's and pain free.  She reports a couple of episodes of vertigo since 12/19/23 stating she can produce it by looking up or turning over in bed.  Positive Lt [place].  Went into Epley.  Retested and she still has a positive Lt [place].  Dizziness and mild nausea noted.  Performed 2nd Epley.  Pt instructed to stay inclined tonight and avoid laying on her Lt side for 24 hrs.        Plan:  Continued with skilled PT 1x/week for 3-4 treatments.         Current Problem:   1. Benign paroxysmal positional vertigo, unspecified laterality        2. Vertigo            Subjective Pt reports waking up with vertigo on 12/19/23.  She was lying on her Lt side.    Precautions:  Precautions  STEADI Fall Risk Score (The score of 4 or more indicates an increased risk of falling): 7      Pain:  Pain Assessment  Pain Assessment: 0-10  Pain Score: 0 - No pain      Objective    AROM:  Cervical AROM WFL's and pain free      Strength:  NT      Posture:  Moderate forward head alignment      Palpation:  NT      Balance:  Good standing balance.  Fair walking balance.  Uses st cane      Special Tests:  Positive Lt [place]    Outcome Measures:  Other Measures  Dizziness Handicap Inventory: 19/100     Treatments:   Epley x 2    Goals:    Abolish all vertigo/dizziness symptoms  Daily function without dizzness                   Current Participants as of 1/8/2024    Name Type Comments Contact Info    Alyssa Awad MD PCP - General  [phone]    Signature pending

## 2024-01-18 ENCOUNTER — TREATMENT (OUTPATIENT)
Dept: PHYSICAL THERAPY | Facility: CLINIC | Age: 84
End: 2024-01-18
Payer: MEDICARE

## 2024-01-18 DIAGNOSIS — H81.10 BENIGN PAROXYSMAL POSITIONAL VERTIGO, UNSPECIFIED LATERALITY: Primary | ICD-10-CM

## 2024-01-18 PROCEDURE — 95992 CANALITH REPOSITIONING PROC: CPT | Performed by: PHYSICAL THERAPIST

## 2024-01-18 ASSESSMENT — ENCOUNTER SYMPTOMS
DEPRESSION: 0
OCCASIONAL FEELINGS OF UNSTEADINESS: 0
LOSS OF SENSATION IN FEET: 0

## 2024-01-18 ASSESSMENT — PAIN - FUNCTIONAL ASSESSMENT: PAIN_FUNCTIONAL_ASSESSMENT: 0-10

## 2024-01-18 ASSESSMENT — PAIN SCALES - GENERAL: PAINLEVEL_OUTOF10: 0 - NO PAIN

## 2024-01-18 NOTE — PROGRESS NOTES
Physical Therapy    Physical Therapy Treatment    Patient Name: Stefany France  MRN: [id number]  Today's Date: 1/18/2024  Time Calculation  Start Time: 0400  Stop Time: 0430  Time Calculation (min): 30 min      Assessment:  Negative Lt [place].  Positive Rt [place].  Performed Epley.  Negative Rt [place].  Pt instructed to sleeping inclined and to stay off her Rt side, avoid looking up and down for the next 24 hours.      Plan: Continued with skilled PT 1x/week for 3-4 treatments.        Current Problem  1. Benign paroxysmal positional vertigo, unspecified laterality            Subjective  Pt reports having some vertigo since her last treatment when looking up and laying on her Rt side more so than her Lt side.     2 out of 3-4 PT treatments      Precautions  Precautions  STEADI Fall Risk Score (The score of 4 or more indicates an increased risk of falling): 6      Pain  Pain Assessment  Pain Assessment: 0-10  Pain Score: 0 - No pain    Objective     Negative Lt [place]  Positive Rt [place]    Treatments:      Rt Epley      Goals:    Abolish all vertigo/dizziness symptoms  Daily function without dizzness

## 2024-01-24 ENCOUNTER — TREATMENT (OUTPATIENT)
Dept: PHYSICAL THERAPY | Facility: CLINIC | Age: 84
End: 2024-01-24
Payer: MEDICARE

## 2024-01-24 DIAGNOSIS — H81.10 BENIGN PAROXYSMAL POSITIONAL VERTIGO, UNSPECIFIED LATERALITY: Primary | ICD-10-CM

## 2024-01-24 PROCEDURE — 95992 CANALITH REPOSITIONING PROC: CPT | Performed by: PHYSICAL THERAPIST

## 2024-01-24 ASSESSMENT — ENCOUNTER SYMPTOMS
LOSS OF SENSATION IN FEET: 0
OCCASIONAL FEELINGS OF UNSTEADINESS: 0
DEPRESSION: 0

## 2024-01-24 ASSESSMENT — PAIN - FUNCTIONAL ASSESSMENT: PAIN_FUNCTIONAL_ASSESSMENT: 0-10

## 2024-01-24 ASSESSMENT — PAIN SCALES - GENERAL: PAINLEVEL_OUTOF10: 0 - NO PAIN

## 2024-01-24 NOTE — PROGRESS NOTES
Physical Therapy    Physical Therapy Treatment    Patient Name: Stefany France  MRN: [id number]  Today's Date: 1/24/2024  Time Calculation  Start Time: 0400  Stop Time: 0425  Time Calculation (min): 25 min      Assessment:  Positive Rt [place].  Performed Rt Epley.  Negative Rt [place].  Pt instructed to sleep inclined tonight, stay off her Rt side and avoid looking up or down for 24 hours.       Plan:  Continued with skilled PT 1x/week for 3-4 treatments.       Current Problem  1. Benign paroxysmal positional vertigo, unspecified laterality            Subjective  Pt reports 1 episode of vertigo since her last visit.    3 out of 3-4 PT treatments    Precautions  Precautions  STEADI Fall Risk Score (The score of 4 or more indicates an increased risk of falling): 6    Pain  Pain Assessment  Pain Assessment: 0-10  Pain Score: 0 - No pain    Objective    Negative Roll Test (B)  Positive Rt [place]    Treatments:    Rt Epley    Goals:    Abolish all vertigo/dizziness symptoms  Daily function without dizzness

## 2024-01-29 ENCOUNTER — APPOINTMENT (OUTPATIENT)
Dept: PHYSICAL THERAPY | Facility: CLINIC | Age: 84
End: 2024-01-29
Payer: MEDICARE

## 2024-02-05 ENCOUNTER — APPOINTMENT (OUTPATIENT)
Dept: PHYSICAL THERAPY | Facility: CLINIC | Age: 84
End: 2024-02-05
Payer: MEDICARE

## 2024-02-07 ENCOUNTER — HOSPITAL ENCOUNTER (OUTPATIENT)
Dept: LAB | Age: 84
Discharge: HOME | End: 2024-02-07
Payer: MEDICARE

## 2024-02-07 LAB
CREAT UR-MCNC: 222.9 MG/DL
MICROALBUMIN UR-MCNC: 19.3 MG/DL
MICROALBUMIN/CREAT UR-RTO: 86.6 MG/G (ref 0–30)

## 2024-02-07 PROCEDURE — 82570 ASSAY OF URINE CREATININE: CPT

## 2024-02-07 PROCEDURE — 82043 UR ALBUMIN QUANTITATIVE: CPT

## 2024-05-16 DIAGNOSIS — E78.2 MIXED HYPERLIPIDEMIA: ICD-10-CM

## 2024-05-16 RX ORDER — ATORVASTATIN CALCIUM 20 MG/1
20 TABLET, FILM COATED ORAL DAILY
Qty: 90 TABLET | Refills: 3 | Status: SHIPPED | OUTPATIENT
Start: 2024-05-16 | End: 2025-05-16

## 2024-05-16 NOTE — TELEPHONE ENCOUNTER
Received request for prescription refill for patient.  Patient follows with Dr. Juwan Naqvi, DO     Request is for Lipitor  Is patient currently on medication- yes    Last OV- 8/16/23  Next OV- 8/21/24    Pended for signing and sent to provider.

## 2024-05-26 NOTE — PROGRESS NOTES
Patient : Sukhwinder Ferro Age: 35 year old Sex: male   MRN: [id number] Encounter Date: 5/25/2024      History     Chief Complaint   Patient presents with    Shoulder Injury     HPI    Allergies   Allergen Reactions    Amoxicillin RASH       Current Discharge Medication List        Prior to Admission Medications    Details   methocarbamol (ROBAXIN) 750 MG tablet Take 2 tablets by mouth 3 times daily as needed (Muscle Spasm).  Qty: 24 tablet, Refills: 0      predniSONE (DELTASONE) 20 MG tablet Take 2 tablets by mouth daily for 5 days.  Qty: 10 tablet, Refills: 0      HYDROcodone-acetaminophen (NORCO) 5-325 MG per tablet Take 1 tablet by mouth every 6 hours as needed for Pain.  Qty: 8 tablet, Refills: 0      meloxicam (MOBIC) 15 MG tablet Take 1 tablet by mouth daily for 14 days.  Qty: 14 tablet, Refills: 0      ondansetron (ZOFRAN ODT) 4 MG disintegrating tablet Place 1 tablet onto the tongue every 8 hours as needed for Nausea.  Qty: 20 tablet, Refills: 0      acetaminophen (TYLENOL) 500 MG tablet       Triamcinolone Acetonide (NASACORT AQ NA)       fluticasone (FLONASE) 50 MCG/ACT nasal spray Spray 2 sprays in each nostril daily.  Qty: 16 g, Refills: 12      Pseudoephedrine HCl (SUDAFED 12 HOUR PO) Take 1 tablet by mouth as needed.             Past Medical History:   Diagnosis Date    Arthritis     Fracture        Past Surgical History:   Procedure Laterality Date    TESTICLE SURGERY         Family History   Problem Relation Age of Onset    Diabetes Sister     Cancer Maternal Uncle        Social History     Tobacco Use    Smoking status: Former     Types: Cigarettes    Smokeless tobacco: Never   Vaping Use    Vaping status: never used   Substance Use Topics    Alcohol use: Not Currently    Drug use: No       E-cigarette/Vaping    E-Cigarette/Vaping Use Never Used      E-Cigarette/Vaping Substances & Devices       Review of Systems    Physical Exam     ED Triage Vitals   ED Triage Vitals Group      Temp 05/25/24 0576 98.5  Physical Therapy    Physical Therapy Evaluation and Treatment      Patient Name: Stefany France  MRN: [id number]  Today's Date: 1/8/2024  Time Calculation  Start Time: 0400  Stop Time: 0443  Time Calculation (min): 43 min    Assessment:  This 84 yo pleasant female is present today with the diagnosis of vertigo.  She reports being seen in the past from a therapist at [facility] who is no longer there.  She had vertigo episode on 12/19/23, waking up in the morning laying on her Lt side.  Vertigo lasted less than 1 minute.  No neck pain.  Cervical AROM WFL's and pain free.  She reports a couple of episodes of vertigo since 12/19/23 stating she can produce it by looking up or turning over in bed.  Positive Lt Chang [place] Watson.  Went into Epley.  Retested and she still has a positive Lt [place].  Dizziness and mild nausea noted.  Performed 2nd Epley.  Pt instructed to stay inclined tonight and avoid laying on her Lt side for 24 hrs.        Plan:  Continued with skilled PT 1x/week for 3-4 treatments.         Current Problem:   1. Benign paroxysmal positional vertigo, unspecified laterality  Follow Up In Physical Therapy      2. Vertigo  Follow Up In Physical Therapy          Subjective  Pt reports waking up with vertigo on 12/19/23.  She was lying on her Lt side.    Precautions:  Precautions  STEADI Fall Risk Score (The score of 4 or more indicates an increased risk of falling): 7      Pain:  Pain Assessment  Pain Assessment: 0-10  Pain Score: 0 - No pain      Objective     AROM:  Cervical AROM WFL's and pain free      Strength:  NT      Posture:  Moderate forward head alignment      Palpation:  NT      Balance:  Good standing balance.  Fair walking balance.  Uses st cane      Special Tests:  Positive Lt Chang [place]    Outcome Measures:  Other Measures  Dizziness Handicap Inventory: 19/100     Treatments:   Epley x 2    Goals:    Abolish all vertigo/dizziness symptoms  Daily function without dizzness             °F (36.9 °C)      Heart Rate 05/25/24 2228 62      Resp 05/25/24 2228 18      BP 05/25/24 2228 139/83      SpO2 05/25/24 2228 96 %      EtCO2 mmHg --       Height 05/25/24 2226 6' 2\" (1.88 m)      Weight 05/25/24 2226 195 lb (88.5 kg)      Weight Scale Used 05/25/24 2226 ED Stated      BMI (Calculated) 05/25/24 2226 25.04      IBW/kg (Calculated) 05/25/24 2226 82.2       Physical Exam    ED Course     Procedures    Lab Results     No results found for this visit on 05/25/24.    EKG Results     EKG Interpretation  Rate: ***  Rhythm: {rhythm:15756}   Abnormality: {ABNORMAL:233426}    EKG tracing interpreted by ED physician    Radiology Results     Imaging Results    None         ED Medication Orders (From admission, onward)      None                 Medical Decision Making      Clinical Impression     No diagnosis found.    Disposition        There is no disposition no dispo time  There is no comment       Rhythm: Normal rate and regular rhythm.      Pulses: Normal pulses.           Radial pulses are 2+ on the right side and 2+ on the left side.   Pulmonary:      Effort: Pulmonary effort is normal. No respiratory distress.   Abdominal:      General: Abdomen is flat. There is no distension.   Musculoskeletal:         General: No deformity. Normal range of motion.        Arms:       Cervical back: Normal range of motion. No rigidity.      Comments: No bony tenderness or deformity.  Left upper extremity distal strength and sensation intact; FROM.   Skin:     General: Skin is warm and dry.      Capillary Refill: Capillary refill takes less than 2 seconds.      Findings: No rash.   Neurological:      Mental Status: He is alert and oriented to person, place, and time.      Cranial Nerves: No dysarthria or facial asymmetry.      Sensory: Sensation is intact. No sensory deficit.      Motor: Motor function is intact. No weakness.      Coordination: Coordination is intact.   Psychiatric:         Behavior: Behavior is cooperative.         ED Course     Procedures    Lab Results     No results found for this visit on 05/25/24.    EKG Results       Radiology Results     Imaging Results              XR SHOULDER 3 VIEWS LEFT (Final result)  Result time 05/25/24 22:56:54      Final result                   Impression:    IMPRESSION:    1.  No acute osseous abnormalities.  2.  Stable intact distal clavicular ORIF.  3.  Stable widening of the coracoclavicular and acromioclavicular distance  which may relate to prior surgery and/or injury.      Electronically Signed by: Otilio Chavez DO  Signed on: 5/25/2024 10:56 PM  Created on Workstation ID: [id number]  Signed on Workstation ID: [id number]               Narrative:    EXAM:  XR SHOULDER 3 VIEWS LEFT    CLINICAL HISTORY: Left posterior shoulder pain     COMPARISON: April 30, 2024.    FINDINGS:    No new/acute fracture or dislocation. Stable distal clavicular orthopedic  plate and screw  transfixing healing distal clavicular fracture. Fracture  lines remain visible. Stable elevation of the coracoclavicular and  acromioclavicular distance. Soft tissues are intact. Visualized left upper  lung is clear.                                      ED Medication Orders (From admission, onward)      Ordered Start     Status Ordering Provider    05/25/24 2241 05/25/24 2242  ketorolac (TORADOL) injection 30 mg  ONCE         Last MAR action: Given AMADA MUELLER    05/25/24 2241 05/25/24 2242  diazePAM (VALIUM) tablet 5 mg  ONCE         Last MAR action: Given AMADA MUELLER                 Radiology Review: I have independently interpreted the Xray of the Left Shoulder and have found No Fracture.  I am awaiting on the final radiology read.        Medical Decision Making    35 year old male who presents with left shoulder pain x4 days. PEx per above.   Pt given Toradol, Valium, and Lidocaine patch for pain.  XRay Left shoulder negative for acute fracture or other acute abnormality.  Presentation consistent with muscular pain.  Patient reassessed - Patient reports Sx improved. Patient well-appearing and vital signs reassuring. Discussed with Patient XRay findings and plan for discharge.  Patient given prescription for short course of Valium.  Patient was given ED warnings, discharge instructions, and follow up information to go home with. Patient understands and agrees with plan for discharge. Any questions have been answered. Patient discharged in good condition.        Clinical Impression     ED Diagnosis   1. Acute pain of left shoulder            Disposition        Discharge 5/25/2024 11:39 PM  Sukhwinder Ferro discharge to home/self care.             Amada Mueller, DO  05/26/24 0317

## 2024-06-03 ENCOUNTER — HOSPITAL ENCOUNTER (OUTPATIENT)
Dept: LAB | Age: 84
Discharge: HOME | End: 2024-06-03
Payer: COMMERCIAL

## 2024-06-03 LAB
ANION GAP SERPL CALCULATED.3IONS-SCNC: 14 MEQ/L (ref 9–15)
BUN SERPL-MCNC: 25 MG/DL (ref 8–23)
CALCIUM SERPL-MCNC: 8.9 MG/DL (ref 8.5–9.9)
CHLORIDE SERPL-SCNC: 102 MEQ/L (ref 95–107)
CO2 SERPL-SCNC: 23 MEQ/L (ref 20–31)
CREAT SERPL-MCNC: 0.94 MG/DL (ref 0.5–0.9)
ESTIMATED AVERAGE GLUCOSE: 143 MG/DL
GLUCOSE SERPL-MCNC: 158 MG/DL (ref 70–99)
HBA1C MFR BLD: 6.6 % (ref 4–6)
POTASSIUM SERPL-SCNC: 4 MEQ/L (ref 3.4–4.9)
SODIUM SERPL-SCNC: 139 MEQ/L (ref 135–144)

## 2024-06-03 PROCEDURE — 80048 BASIC METABOLIC PNL TOTAL CA: CPT

## 2024-06-03 PROCEDURE — 36415 COLL VENOUS BLD VENIPUNCTURE: CPT

## 2024-06-03 PROCEDURE — 83036 HEMOGLOBIN GLYCOSYLATED A1C: CPT

## 2024-06-24 DIAGNOSIS — Z98.61 CAD S/P PERCUTANEOUS CORONARY ANGIOPLASTY: ICD-10-CM

## 2024-06-24 DIAGNOSIS — I25.10 CAD S/P PERCUTANEOUS CORONARY ANGIOPLASTY: ICD-10-CM

## 2024-06-24 RX ORDER — CLOPIDOGREL BISULFATE 75 MG/1
75 TABLET ORAL DAILY
Qty: 90 TABLET | Refills: 3 | Status: SHIPPED | OUTPATIENT
Start: 2024-06-24 | End: 2025-06-24

## 2024-06-24 NOTE — TELEPHONE ENCOUNTER
Received request for prescription refill for patient.  Patient follows with Dr. Juwan Naqvi, DO     Request is for Plavix  Is patient currently on medication- yes    Last OV- 8/16/23  Next OV- 8/21/24    Pended for signing and sent to provider.

## 2024-06-27 RX ORDER — NITROFURANTOIN 25; 75 MG/1; MG/1
100 CAPSULE ORAL 2 TIMES DAILY
COMMUNITY
Start: 2023-11-10

## 2024-06-27 RX ORDER — METFORMIN HYDROCHLORIDE 500 MG/1
500 TABLET, EXTENDED RELEASE ORAL DAILY
COMMUNITY
Start: 2024-01-14

## 2024-07-01 ENCOUNTER — APPOINTMENT (OUTPATIENT)
Dept: NEUROLOGY | Facility: CLINIC | Age: 84
End: 2024-07-01
Payer: MEDICARE

## 2024-07-01 VITALS
WEIGHT: 170.6 LBS | HEART RATE: 56 BPM | BODY MASS INDEX: 31.39 KG/M2 | HEIGHT: 62 IN | DIASTOLIC BLOOD PRESSURE: 59 MMHG | SYSTOLIC BLOOD PRESSURE: 138 MMHG

## 2024-07-01 DIAGNOSIS — Z98.61 CAD S/P PERCUTANEOUS CORONARY ANGIOPLASTY: ICD-10-CM

## 2024-07-01 DIAGNOSIS — I25.10 CAD S/P PERCUTANEOUS CORONARY ANGIOPLASTY: ICD-10-CM

## 2024-07-01 DIAGNOSIS — R42 VERTIGO: ICD-10-CM

## 2024-07-01 DIAGNOSIS — I63.311 CEREBROVASCULAR ACCIDENT (CVA) DUE TO THROMBOSIS OF RIGHT MIDDLE CEREBRAL ARTERY (MULTI): Primary | ICD-10-CM

## 2024-07-01 DIAGNOSIS — H81.10 BENIGN PAROXYSMAL POSITIONAL VERTIGO, UNSPECIFIED LATERALITY: ICD-10-CM

## 2024-07-01 PROCEDURE — 1036F TOBACCO NON-USER: CPT | Performed by: PSYCHIATRY & NEUROLOGY

## 2024-07-01 PROCEDURE — 3075F SYST BP GE 130 - 139MM HG: CPT | Performed by: PSYCHIATRY & NEUROLOGY

## 2024-07-01 PROCEDURE — 3078F DIAST BP <80 MM HG: CPT | Performed by: PSYCHIATRY & NEUROLOGY

## 2024-07-01 PROCEDURE — 99214 OFFICE O/P EST MOD 30 MIN: CPT | Performed by: PSYCHIATRY & NEUROLOGY

## 2024-07-01 PROCEDURE — 1159F MED LIST DOCD IN RCRD: CPT | Performed by: PSYCHIATRY & NEUROLOGY

## 2024-07-01 RX ORDER — AMOXICILLIN 500 MG/1
1 TABLET, FILM COATED ORAL
COMMUNITY
Start: 2024-02-15

## 2024-07-01 RX ORDER — AMOXICILLIN 875 MG/1
1 TABLET, FILM COATED ORAL
COMMUNITY
Start: 2023-07-03

## 2024-07-01 ASSESSMENT — ENCOUNTER SYMPTOMS
NUMBNESS: 0
HEADACHES: 0
SLEEP DISTURBANCE: 0
NECK PAIN: 0
FREQUENCY: 0
NAUSEA: 0
HYPERACTIVE: 0
FACIAL ASYMMETRY: 0
ARTHRALGIAS: 0
FEVER: 0
WEAKNESS: 0
TROUBLE SWALLOWING: 0
SPEECH DIFFICULTY: 0
BRUISES/BLEEDS EASILY: 0
NECK STIFFNESS: 0
FATIGUE: 0
PHOTOPHOBIA: 0
ADENOPATHY: 0
DIFFICULTY URINATING: 0
UNEXPECTED WEIGHT CHANGE: 0
DIZZINESS: 0
SHORTNESS OF BREATH: 0
ABDOMINAL PAIN: 0
HALLUCINATIONS: 0
VOMITING: 0
SEIZURES: 0
PALPITATIONS: 0
CONFUSION: 0
BACK PAIN: 0
COUGH: 0
AGITATION: 0
TREMORS: 0
JOINT SWELLING: 0
LIGHT-HEADEDNESS: 0
WHEEZING: 0
SINUS PRESSURE: 0
EYE PAIN: 0

## 2024-07-01 ASSESSMENT — PATIENT HEALTH QUESTIONNAIRE - PHQ9
1. LITTLE INTEREST OR PLEASURE IN DOING THINGS: NOT AT ALL
2. FEELING DOWN, DEPRESSED OR HOPELESS: NOT AT ALL
SUM OF ALL RESPONSES TO PHQ9 QUESTIONS 1 & 2: 0

## 2024-07-01 NOTE — PROGRESS NOTES
Stefany France  84 y.o.       SUBJECTIVE    HPI   Stefany is a 84-year-old young lady who was seen in follow-up of her right MCA infarct with left hemiparesis and benign positional vertigo with multiple other medical issues since last seen she is doing very well but does have issues with her balance and periodically complains of vertigo when she is turning her head to the right side.  Today her physical and neurological examination revealed her to have a wide-based ataxic gait and she was able to ambulate with the help of a cane.  I would like to continue all her medications the way she is taking and discussed the gait safety issue with the precautions to be taken and the effect and the side of the medication and depending on how she does I might make future recommendation when she comes back to see me in a year.    I have discussed the warning signs for CVA and the precautions to be taken while driving or climbing stairs which she and her daughter understood.  Due to technical limitations of voice recognition and human error, this note may not accurately reflect the care of the patient.    Review of Systems   Constitutional:  Negative for fatigue, fever and unexpected weight change.   HENT:  Negative for dental problem, ear pain, hearing loss, sinus pressure, tinnitus and trouble swallowing.    Eyes:  Negative for photophobia, pain and visual disturbance.   Respiratory:  Negative for cough, shortness of breath and wheezing.    Cardiovascular:  Negative for chest pain, palpitations and leg swelling.   Gastrointestinal:  Negative for abdominal pain, nausea and vomiting.   Genitourinary:  Negative for difficulty urinating, enuresis and frequency.   Musculoskeletal:  Negative for arthralgias, back pain, joint swelling, neck pain and neck stiffness.   Skin:  Negative for pallor and rash.   Allergic/Immunologic: Negative for food allergies.   Neurological:  Negative for dizziness, tremors, seizures, syncope, facial  "asymmetry, speech difficulty, weakness, light-headedness, numbness and headaches.   Hematological:  Negative for adenopathy. Does not bruise/bleed easily.   Psychiatric/Behavioral:  Negative for agitation, behavioral problems, confusion, hallucinations and sleep disturbance. The patient is not hyperactive.         Patient Active Problem List   Diagnosis    Benign essential hypertension    Benign paroxysmal positional vertigo    Bilateral carotid artery stenosis    Bilateral impacted cerumen    Bilateral sensorineural hearing loss    Bradycardia    CAD S/P percutaneous coronary angioplasty    Cervicalgia    Chronic rhinitis    CVA (cerebral vascular accident) (Multi)    Dyslipidemia    Ear pressure    Gastroesophageal reflux disease    Hyperlipidemia    Hypokalemia    Lightheaded    LVH (left ventricular hypertrophy)    Nonspecific abnormal results of liver function study    Obesity    Postural hypotension    Presbycusis, bilateral    Tinnitus, bilateral    Vertigo     Past Medical History:   Diagnosis Date    Transient cerebral ischemic attack, unspecified     Mini stroke     Past Surgical History:   Procedure Laterality Date    OTHER SURGICAL HISTORY  06/21/2022    Breast biopsy    OTHER SURGICAL HISTORY  06/21/2022    Cardiac catheterization with stent placement    OTHER SURGICAL HISTORY  06/21/2022    Dermatological surgery    OTHER SURGICAL HISTORY  06/21/2022    Dilation and curettage    OTHER SURGICAL HISTORY  06/21/2022    Tonsillectomy    OTHER SURGICAL HISTORY  06/21/2022    Lithotripsy       reports that she has quit smoking. Her smoking use included cigarettes. She has never used smokeless tobacco. Alcohol use questions deferred to the physician. She reports that she does not use drugs.    /59 (BP Location: Left arm, Patient Position: Sitting, BP Cuff Size: Large adult)   Pulse 56   Ht 1.575 m (5' 2\")   Wt 77.4 kg (170 lb 9.6 oz)   BMI 31.20 kg/m²     OBJECTIVE  Physical Exam/Neurological Exam "   Constitutional: General appearance: no acute distress   Auscultation of Heart: Regular rate and rhythm, no murmurs, normal S1 and S2.   Carotid Arteries: Intact without any bruits.   Neck is supple.   No lymph adenopathy.   Peripheral Vascular Exam: Pulses +2 and equal in all extremities. No swelling, varicosities, edema or tenderness to palpations.   Abdomen is soft, nondistended. No organomegaly.  Mental status: The patient was in no distress, alert, interactive and cooperative. Affect is appropriate.   Orientation: oriented to person, oriented to place and oriented to time.   Memory: recent memory intact and remote memory intact.   Attention: normal attention span and normal concentrating ability.   Language: normal comprehension and no difficulty naming common objects.   Fund of knowledge: Patient displays adequate knowledge of current events, adequate fund of knowledge regarding past history and adequate fund of knowledge regarding vocabulary.   Eyes: The ophthalmoscopic examination was normal. The fundi are visualized with normal disc margins and without.  Cranial nerve II: Visual fields full to confrontation.   Cranial nerves III, IV, and VI: Pupils round, equally reactive to light; no ptosis. EOMs intact. No nystagmus.   Cranial Nerve V: Facial sensation intact bilaterally.   Cranial nerve VII: Normal and symmetric facial strength.   Cranial nerve VIII: Hearing is intact bilaterally to finger rub / whisper.   Cranial nerves IX and X: Palate elevates symmetrically.   Cranial nerve XI: Shoulder shrug and neck rotation strength are intact.   Cranial nerve XII: Tongue midline with normal strength.   Motor: Motor exam was normal. Muscle bulk was normal in both upper and lower extremities. Muscle tone was normal in both upper and lower extremities. Muscle strength was 5/5 throughout. no abnormal or adventitious movements were present.   Reflexes revealed a slightly increased of from the left compared to the  right fifth plantar extensor response understood.   Plantar Reflex: Toes downgoing to plantar stimulation on the left. Toes downgoing to plantar stimulation on the right.   Sensory Exam: Normal to light touch.   Coordination: There is no limb dystaxia and rapid alternating movements are intact.  Gait: Gait is ataxic and she was able to ambulate with the help of a cane.       ASSESSMENT/PLAN  Diagnoses and all orders for this visit:  Cerebrovascular accident (CVA) due to thrombosis of right middle cerebral artery (Multi)  -     Disability Placard  Vertigo  Benign paroxysmal positional vertigo, unspecified laterality  CAD S/P percutaneous coronary angioplasty        Jimmy Rivera MD  7/1/2024  12:24 PM

## 2024-08-06 DIAGNOSIS — I10 BENIGN ESSENTIAL HYPERTENSION: ICD-10-CM

## 2024-08-07 RX ORDER — HYDROCHLOROTHIAZIDE 25 MG/1
25 TABLET ORAL DAILY
Qty: 90 TABLET | Refills: 3 | Status: SHIPPED | OUTPATIENT
Start: 2024-08-07 | End: 2025-08-07

## 2024-08-12 ENCOUNTER — HOSPITAL ENCOUNTER (OUTPATIENT)
Dept: RADIOLOGY | Facility: CLINIC | Age: 84
Discharge: HOME | End: 2024-08-12
Payer: MEDICARE

## 2024-08-12 ENCOUNTER — HOSPITAL ENCOUNTER (OUTPATIENT)
Dept: CARDIOLOGY | Facility: CLINIC | Age: 84
Discharge: HOME | End: 2024-08-12
Payer: MEDICARE

## 2024-08-12 DIAGNOSIS — Z98.61 CAD S/P PERCUTANEOUS CORONARY ANGIOPLASTY: Primary | ICD-10-CM

## 2024-08-12 DIAGNOSIS — I25.10 CAD S/P PERCUTANEOUS CORONARY ANGIOPLASTY: Primary | ICD-10-CM

## 2024-08-12 DIAGNOSIS — I25.10 ATHEROSCLEROTIC HEART DISEASE OF NATIVE CORONARY ARTERY WITHOUT ANGINA PECTORIS: ICD-10-CM

## 2024-08-12 DIAGNOSIS — Z98.61 CORONARY ANGIOPLASTY STATUS: ICD-10-CM

## 2024-08-12 PROCEDURE — 93017 CV STRESS TEST TRACING ONLY: CPT

## 2024-08-12 PROCEDURE — A9502 TC99M TETROFOSMIN: HCPCS | Performed by: INTERNAL MEDICINE

## 2024-08-12 PROCEDURE — 2500000004 HC RX 250 GENERAL PHARMACY W/ HCPCS (ALT 636 FOR OP/ED): Performed by: INTERNAL MEDICINE

## 2024-08-12 PROCEDURE — 3430000001 HC RX 343 DIAGNOSTIC RADIOPHARMACEUTICALS: Performed by: INTERNAL MEDICINE

## 2024-08-12 PROCEDURE — 78452 HT MUSCLE IMAGE SPECT MULT: CPT

## 2024-08-12 RX ORDER — REGADENOSON 0.08 MG/ML
0.4 INJECTION, SOLUTION INTRAVENOUS ONCE
Status: COMPLETED | OUTPATIENT
Start: 2024-08-12 | End: 2024-08-12

## 2024-08-21 ENCOUNTER — APPOINTMENT (OUTPATIENT)
Dept: CARDIOLOGY | Facility: CLINIC | Age: 84
End: 2024-08-21
Payer: MEDICARE

## 2024-08-21 VITALS
DIASTOLIC BLOOD PRESSURE: 68 MMHG | BODY MASS INDEX: 30.78 KG/M2 | WEIGHT: 173.7 LBS | SYSTOLIC BLOOD PRESSURE: 128 MMHG | HEART RATE: 64 BPM | HEIGHT: 63 IN

## 2024-08-21 DIAGNOSIS — E78.5 DYSLIPIDEMIA: ICD-10-CM

## 2024-08-21 DIAGNOSIS — I51.7 LVH (LEFT VENTRICULAR HYPERTROPHY): ICD-10-CM

## 2024-08-21 DIAGNOSIS — Z98.61 CAD S/P PERCUTANEOUS CORONARY ANGIOPLASTY: ICD-10-CM

## 2024-08-21 DIAGNOSIS — Z87.891 FORMER SMOKER: ICD-10-CM

## 2024-08-21 DIAGNOSIS — I25.10 CAD S/P PERCUTANEOUS CORONARY ANGIOPLASTY: ICD-10-CM

## 2024-08-21 DIAGNOSIS — I10 BENIGN ESSENTIAL HYPERTENSION: ICD-10-CM

## 2024-08-21 DIAGNOSIS — I65.23 BILATERAL CAROTID ARTERY STENOSIS: ICD-10-CM

## 2024-08-21 PROCEDURE — 1159F MED LIST DOCD IN RCRD: CPT | Performed by: INTERNAL MEDICINE

## 2024-08-21 PROCEDURE — 1036F TOBACCO NON-USER: CPT | Performed by: INTERNAL MEDICINE

## 2024-08-21 PROCEDURE — 3078F DIAST BP <80 MM HG: CPT | Performed by: INTERNAL MEDICINE

## 2024-08-21 PROCEDURE — 99214 OFFICE O/P EST MOD 30 MIN: CPT | Performed by: INTERNAL MEDICINE

## 2024-08-21 PROCEDURE — 3074F SYST BP LT 130 MM HG: CPT | Performed by: INTERNAL MEDICINE

## 2024-08-21 RX ORDER — HYDRALAZINE HYDROCHLORIDE 25 MG/1
25 TABLET, FILM COATED ORAL 2 TIMES DAILY
Qty: 180 TABLET | Refills: 3 | Status: SHIPPED | OUTPATIENT
Start: 2024-08-21 | End: 2025-08-21

## 2024-08-21 NOTE — PATIENT INSTRUCTIONS
Start Hydralazine 25 mg BID  1 year visit    Patient educated on proper medication use.   Patient educated on risk factor modification.   Please bring any lab results from other providers / physicians to your next appointment.     Please bring all medicines, vitamins, and herbal supplements with you when you come to the office.     Prescriptions will not be filled unless you are compliant with your follow up appointments or have a follow up appointment scheduled as per instruction of your physician. Refills should be requested at the time of your visit.

## 2024-08-21 NOTE — PROGRESS NOTES
CARDIOLOGY OFFICE VISIT      CHIEF COMPLAINT  Chief Complaint   Patient presents with    Follow-up     1 year follow up and to review recent testing results        HISTORY OF PRESENT ILLNESS  Patient is a 84-year-old  female with past medical history significant for coronary artery disease, status post non-ST elevation myocardial infarction, status post drug-eluting stents LAD and left circumflex 2019, cerebrovascular accident, hypertension, left ventricular hypertrophy, hyperlipidemia who presents for follow-up cardiovascular care.     Patient has chronic unsteady balance and has not yet followed with physical therapy that was ordered for her by another physician.  She will use a pedal bike every other day for 10 to 15 minutes.  She denies chest pain, dyspnea, palpitations, nausea, vomiting.  Occasional dizziness getting up without near syncope or esperanza syncope.  She drinks 2 cups of coffee per day.  Drinks approximately 50 ounces of water per day.  Patient has occasional edema lower extremities after prolonged drive.  Home blood pressure readings are moderately elevated 50% of the time.        Past medical history:  As above plus  Gastroesophageal reflux disease  Nephrolithiasis  Optic migraines  Vertigo     Past surgical history:  Tonsillectomy  Right breast lumpectomy  D&C     Social history:  Quit smoking age 58, smoked up to half a pack of cigarettes per day for approximately 30 years.  62 glasses of wine daily     Family history:  Mother  in her 60s with emphysema  Father  60s cardiac arrest perioperative     Review systems are negative, noncontributory, orders previously mentioned Ã--12 systems.     I personally reviewed EKG 2019: Sinus bradycardia, nonspecific ST abnormality     Assessment:  Coronary artery disease, status post non-ST elevation myocardial, NORBERT LAD and left circumflex 2019  Hypertension  Left ventricular  hypertrophy  Hyperlipidemia  Hyperglycemia  Cerebrovascular accident June 24, 2019- Dr Rivera  Gastroesophageal reflux disease  Vertigo  Chronic unsteady balance  Tinnitis  Intolerance losartan due to dizziness  ACE inhibitor intolerance cough/hoarse voice  Intolerance to fenofibrate due to constipation     Recommendations:  Patient appears to be stable from a cardiac standpoint. No symptoms of angina. No ischemia on stress test.  For uncontrolled hypertension add hydralazine 25 mg twice daily.  Continue to maintain blood pressure diary. No symptomatic arrhythmia. No evidence of heart failure. Advise low-sodium diet less than 2000 mg/day. Encouraged patient increase water intake to 64 ounces per day. Advise diet, weight loss, exercise program 30 minutes per day.  I advised the patient follow through with physical.  PT and gradually increase aerobic activity to 30 minutes/day.  Follow-up in 1 year.      Please excuse any errors in grammar or translation related to this dictation. Voice recognition software was utilized to prepare this document.     Recent Cardiovascular Testing:  The following results have been reviewed and updated.   Lab  10/23/23  , HDL 60, LDL 53, Trig 157        Echo 6/24/19  1. EF 60-65%  2. 1+ AVR  3. Mild LVH  4. Moderate left atrial enlargement     24 hour HOlter 8/1/19  Sinus rhythm average 70 bpm  no cardiac arrhythmias       CRD: 7/28/20  1. Mild carotid artery stenosis.     MPL:8/12/24  1. Normal  2. EF 73%.      VITALS  Vitals:    08/21/24 1523   BP: 128/68   Pulse: 64     Wt Readings from Last 4 Encounters:   08/21/24 78.8 kg (173 lb 11.2 oz)   07/01/24 77.4 kg (170 lb 9.6 oz)   07/11/23 78.5 kg (173 lb)   08/24/22 77.7 kg (171 lb 6 oz)       PHYSICAL EXAM:  GENERAL:  Well developed, well nourished, in no acute distress.  CHEST:  Symmetric and nontender.  NEURO/PSYCH:  Alert and oriented times three with approppriate behavior and responses.  NECK:  Supple, no JVD, no  bruit.  LUNGS:  Clear to auscultation bilaterally, normal respiratory effort.  HEART:  Rate and rhythm REGULAR with no evident murmur, no gallop appreciated.    There are no rubs, clicks or heaves.  EXTREMITIES:  Warm with good color, no clubbing or cyanosis.  There is no edema noted.  PERIPHERAL VASCULAR:  Pulses present and equally palpable; 2+ throughout.    ASSESSMENT AND PLAN  Problem List Items Addressed This Visit       Benign essential hypertension    Bilateral carotid artery stenosis    CAD S/P percutaneous coronary angioplasty    Dyslipidemia    LVH (left ventricular hypertrophy)    BMI 30.0-30.9,adult    Former smoker       Past Medical History  Past Medical History:   Diagnosis Date    Transient cerebral ischemic attack, unspecified     Mini stroke       Social History  Social History     Tobacco Use    Smoking status: Former     Current packs/day: 0.00     Types: Cigarettes     Quit date:      Years since quittin.    Smokeless tobacco: Never   Vaping Use    Vaping status: Never Used   Substance Use Topics    Alcohol use: Yes     Comment: 2-3x a week    Drug use: Never       Family History   No family history on file.     Allergies:  Allergies   Allergen Reactions    Ace Inhibitors Cough        Outpatient Medications:  Current Outpatient Medications   Medication Instructions    amLODIPine (NORVASC) 10 mg, oral, Daily    atenolol (TENORMIN) 25 mg, oral, 2 times daily    atorvastatin (LIPITOR) 20 mg, oral, Daily    clopidogrel (PLAVIX) 75 mg, oral, Daily    FLUoxetine (PROZAC) 40 mg, oral, Daily    fluticasone (Flonase) 50 mcg/actuation nasal spray SPRAY 2 SPRAYS IN EACH NOSTRIL DAILY    hydroCHLOROthiazide (HYDRODIURIL) 25 mg, oral, Daily    metFORMIN XR (GLUCOPHAGE-XR) 500 mg, oral, Daily    omeprazole OTC (PRILOSEC OTC) 20 mg    potassium chloride CR 10 mEq ER tablet 10 mEq, oral, 2 times daily    traZODone (DESYREL) 50 mg, oral, Nightly        Recent Lab Results:    CMP:    Lab Results    Component Value Date     10/23/2023    K 3.9 10/23/2023     10/23/2023    CO2 29 10/23/2023    BUN 21 10/23/2023    CREATININE 1.04 10/23/2023    GLUCOSE 136 (H) 10/23/2023    CALCIUM 9.5 10/23/2023       Magnesium:    Lab Results   Component Value Date    MG 1.70 08/06/2023       Lipid Profile:    Lab Results   Component Value Date    TRIG 157 (H) 10/23/2023    HDL 60.4 10/23/2023    LDLCALC 53 10/23/2023    LDLDIRECT 73 07/17/2023       TSH:    Lab Results   Component Value Date    TSH 1.85 07/17/2023       BNP:   Lab Results   Component Value Date     (H) 08/06/2023        PT/INR:    Lab Results   Component Value Date    PROTIME 12.0 08/06/2023    INR 1.1 08/06/2023       HgBA1c:    Lab Results   Component Value Date    HGBA1C 6.6 (H) 06/03/2024       BMP:  Lab Results   Component Value Date     10/23/2023     08/06/2023     07/17/2023     03/18/2023    K 3.9 10/23/2023    K 3.3 (L) 08/06/2023    K 3.4 (L) 07/17/2023    K 3.5 03/18/2023     10/23/2023     08/06/2023     07/17/2023     03/18/2023    CO2 29 10/23/2023    CO2 23 08/06/2023    CO2 26 07/17/2023    CO2 25 03/18/2023    BUN 21 10/23/2023    BUN 21 08/06/2023    BUN 19 07/17/2023    BUN 21 03/18/2023    CREATININE 1.04 10/23/2023    CREATININE 1.01 08/06/2023    CREATININE 0.95 07/17/2023    CREATININE 1.04 03/18/2023       CBC:  Lab Results   Component Value Date    WBC 6.0 08/06/2023    WBC 8.5 07/17/2023    WBC 6.7 03/18/2023    RBC 5.34 (H) 08/06/2023    RBC 5.02 07/17/2023    RBC 5.09 03/18/2023    HGB 13.1 08/06/2023    HGB 12.5 07/17/2023    HGB 12.5 03/18/2023    HCT 41.8 08/06/2023    HCT 40.1 07/17/2023    HCT 40.9 03/18/2023    MCV 78 (L) 08/06/2023    MCV 80 07/17/2023    MCV 80 03/18/2023    MCHC 31.3 (L) 08/06/2023    MCHC 31.2 (L) 07/17/2023    MCHC 30.6 (L) 03/18/2023    RDW 15.2 (H) 08/06/2023    RDW 15.1 (H) 07/17/2023    RDW 15.1 (H) 03/18/2023     08/06/2023      07/17/2023     03/18/2023       Cardiac Enzymes:    Lab Results   Component Value Date    TROPHS CANCELED 08/06/2023    TROPHS 4 08/06/2023    TROPHS 5 08/06/2023       Hepatic Function Panel:    Lab Results   Component Value Date    ALKPHOS 84 10/23/2023    ALT 26 10/23/2023    AST 23 10/23/2023    PROT 6.7 10/23/2023    BILITOT 0.6 10/23/2023    BILIDIR 0.1 05/09/2022           Juwan Naqvi DO

## 2024-08-31 ENCOUNTER — HOSPITAL ENCOUNTER (OUTPATIENT)
Facility: HOSPITAL | Age: 84
Setting detail: OBSERVATION
DRG: 069 | End: 2024-08-31
Attending: STUDENT IN AN ORGANIZED HEALTH CARE EDUCATION/TRAINING PROGRAM | Admitting: STUDENT IN AN ORGANIZED HEALTH CARE EDUCATION/TRAINING PROGRAM
Payer: MEDICARE

## 2024-08-31 ENCOUNTER — APPOINTMENT (OUTPATIENT)
Dept: CARDIOLOGY | Facility: HOSPITAL | Age: 84
DRG: 069 | End: 2024-08-31
Payer: MEDICARE

## 2024-08-31 ENCOUNTER — APPOINTMENT (OUTPATIENT)
Dept: RADIOLOGY | Facility: HOSPITAL | Age: 84
DRG: 069 | End: 2024-08-31
Payer: MEDICARE

## 2024-08-31 DIAGNOSIS — I63.10 CEREBROVASCULAR ACCIDENT (CVA) DUE TO EMBOLISM OF PRECEREBRAL ARTERY (MULTI): ICD-10-CM

## 2024-08-31 DIAGNOSIS — I67.848 OTHER CEREBROVASCULAR VASOSPASM AND VASOCONSTRICTION: ICD-10-CM

## 2024-08-31 DIAGNOSIS — Z91.89 AT RISK FOR ARRHYTHMIA: ICD-10-CM

## 2024-08-31 DIAGNOSIS — G45.9 TIA (TRANSIENT ISCHEMIC ATTACK): Primary | ICD-10-CM

## 2024-08-31 LAB
ALBUMIN SERPL BCP-MCNC: 4 G/DL (ref 3.4–5)
ALP SERPL-CCNC: 107 U/L (ref 33–136)
ALT SERPL W P-5'-P-CCNC: 27 U/L (ref 7–45)
ANION GAP SERPL CALC-SCNC: 14 MMOL/L (ref 10–20)
APTT PPP: 30 SECONDS (ref 27–38)
AST SERPL W P-5'-P-CCNC: 22 U/L (ref 9–39)
BASOPHILS # BLD AUTO: 0.07 X10*3/UL (ref 0–0.1)
BASOPHILS NFR BLD AUTO: 0.8 %
BILIRUB SERPL-MCNC: 0.6 MG/DL (ref 0–1.2)
BUN SERPL-MCNC: 19 MG/DL (ref 6–23)
CALCIUM SERPL-MCNC: 9 MG/DL (ref 8.6–10.3)
CARDIAC TROPONIN I PNL SERPL HS: 4 NG/L (ref 0–13)
CHLORIDE SERPL-SCNC: 103 MMOL/L (ref 98–107)
CO2 SERPL-SCNC: 23 MMOL/L (ref 21–32)
CREAT SERPL-MCNC: 0.96 MG/DL (ref 0.5–1.05)
EGFRCR SERPLBLD CKD-EPI 2021: 59 ML/MIN/1.73M*2
EOSINOPHIL # BLD AUTO: 0 X10*3/UL (ref 0–0.4)
EOSINOPHIL NFR BLD AUTO: 0 %
ERYTHROCYTE [DISTWIDTH] IN BLOOD BY AUTOMATED COUNT: 15.7 % (ref 11.5–14.5)
GLUCOSE BLD MANUAL STRIP-MCNC: 114 MG/DL (ref 74–99)
GLUCOSE BLD MANUAL STRIP-MCNC: 123 MG/DL (ref 74–99)
GLUCOSE SERPL-MCNC: 98 MG/DL (ref 74–99)
HCT VFR BLD AUTO: 40.9 % (ref 36–46)
HGB BLD-MCNC: 12.9 G/DL (ref 12–16)
HOLD SPECIMEN: NORMAL
IMM GRANULOCYTES # BLD AUTO: 0.02 X10*3/UL (ref 0–0.5)
IMM GRANULOCYTES NFR BLD AUTO: 0.2 % (ref 0–0.9)
INR PPP: 1.1 (ref 0.9–1.1)
LYMPHOCYTES # BLD AUTO: 2.5 X10*3/UL (ref 0.8–3)
LYMPHOCYTES NFR BLD AUTO: 27.1 %
MCH RBC QN AUTO: 25.1 PG (ref 26–34)
MCHC RBC AUTO-ENTMCNC: 31.5 G/DL (ref 32–36)
MCV RBC AUTO: 80 FL (ref 80–100)
MONOCYTES # BLD AUTO: 1.29 X10*3/UL (ref 0.05–0.8)
MONOCYTES NFR BLD AUTO: 14 %
NEUTROPHILS # BLD AUTO: 5.34 X10*3/UL (ref 1.6–5.5)
NEUTROPHILS NFR BLD AUTO: 57.9 %
NRBC BLD-RTO: 0 /100 WBCS (ref 0–0)
PLATELET # BLD AUTO: 302 X10*3/UL (ref 150–450)
POTASSIUM SERPL-SCNC: 3.4 MMOL/L (ref 3.5–5.3)
PROT SERPL-MCNC: 6.7 G/DL (ref 6.4–8.2)
PROTHROMBIN TIME: 11.9 SECONDS (ref 9.8–12.8)
RBC # BLD AUTO: 5.14 X10*6/UL (ref 4–5.2)
SODIUM SERPL-SCNC: 137 MMOL/L (ref 136–145)
WBC # BLD AUTO: 9.2 X10*3/UL (ref 4.4–11.3)

## 2024-08-31 PROCEDURE — 93005 ELECTROCARDIOGRAM TRACING: CPT

## 2024-08-31 PROCEDURE — 82947 ASSAY GLUCOSE BLOOD QUANT: CPT

## 2024-08-31 PROCEDURE — 99223 1ST HOSP IP/OBS HIGH 75: CPT | Performed by: NURSE PRACTITIONER

## 2024-08-31 PROCEDURE — 85610 PROTHROMBIN TIME: CPT | Performed by: STUDENT IN AN ORGANIZED HEALTH CARE EDUCATION/TRAINING PROGRAM

## 2024-08-31 PROCEDURE — 70450 CT HEAD/BRAIN W/O DYE: CPT

## 2024-08-31 PROCEDURE — 70450 CT HEAD/BRAIN W/O DYE: CPT | Performed by: RADIOLOGY

## 2024-08-31 PROCEDURE — 85730 THROMBOPLASTIN TIME PARTIAL: CPT | Performed by: STUDENT IN AN ORGANIZED HEALTH CARE EDUCATION/TRAINING PROGRAM

## 2024-08-31 PROCEDURE — 84484 ASSAY OF TROPONIN QUANT: CPT | Performed by: STUDENT IN AN ORGANIZED HEALTH CARE EDUCATION/TRAINING PROGRAM

## 2024-08-31 PROCEDURE — G0378 HOSPITAL OBSERVATION PER HR: HCPCS

## 2024-08-31 PROCEDURE — 99285 EMERGENCY DEPT VISIT HI MDM: CPT

## 2024-08-31 PROCEDURE — 80053 COMPREHEN METABOLIC PANEL: CPT | Performed by: STUDENT IN AN ORGANIZED HEALTH CARE EDUCATION/TRAINING PROGRAM

## 2024-08-31 PROCEDURE — 2500000002 HC RX 250 W HCPCS SELF ADMINISTERED DRUGS (ALT 637 FOR MEDICARE OP, ALT 636 FOR OP/ED): Performed by: NURSE PRACTITIONER

## 2024-08-31 PROCEDURE — 85025 COMPLETE CBC W/AUTO DIFF WBC: CPT | Performed by: STUDENT IN AN ORGANIZED HEALTH CARE EDUCATION/TRAINING PROGRAM

## 2024-08-31 PROCEDURE — 2500000001 HC RX 250 WO HCPCS SELF ADMINISTERED DRUGS (ALT 637 FOR MEDICARE OP): Performed by: NURSE PRACTITIONER

## 2024-08-31 PROCEDURE — 36415 COLL VENOUS BLD VENIPUNCTURE: CPT | Performed by: STUDENT IN AN ORGANIZED HEALTH CARE EDUCATION/TRAINING PROGRAM

## 2024-08-31 RX ORDER — LABETALOL HYDROCHLORIDE 5 MG/ML
10 INJECTION, SOLUTION INTRAVENOUS EVERY 10 MIN PRN
Status: DISCONTINUED | OUTPATIENT
Start: 2024-08-31 | End: 2024-09-02

## 2024-08-31 RX ORDER — NITROFURANTOIN 25; 75 MG/1; MG/1
100 CAPSULE ORAL 2 TIMES DAILY
Status: DISCONTINUED | OUTPATIENT
Start: 2024-08-31 | End: 2024-09-02

## 2024-08-31 RX ORDER — DEXTROSE 50 % IN WATER (D50W) INTRAVENOUS SYRINGE
25
Status: DISCONTINUED | OUTPATIENT
Start: 2024-08-31 | End: 2024-09-02 | Stop reason: HOSPADM

## 2024-08-31 RX ORDER — ACETAMINOPHEN 325 MG/1
650 TABLET ORAL EVERY 4 HOURS PRN
Status: DISCONTINUED | OUTPATIENT
Start: 2024-08-31 | End: 2024-09-02 | Stop reason: HOSPADM

## 2024-08-31 RX ORDER — HYDRALAZINE HYDROCHLORIDE 25 MG/1
25 TABLET, FILM COATED ORAL EVERY 6 HOURS PRN
Status: DISCONTINUED | OUTPATIENT
Start: 2024-09-02 | End: 2024-09-02 | Stop reason: HOSPADM

## 2024-08-31 RX ORDER — GUAIFENESIN 600 MG/1
600 TABLET, EXTENDED RELEASE ORAL EVERY 12 HOURS PRN
Status: DISCONTINUED | OUTPATIENT
Start: 2024-08-31 | End: 2024-09-02 | Stop reason: HOSPADM

## 2024-08-31 RX ORDER — NITROFURANTOIN 25; 75 MG/1; MG/1
100 CAPSULE ORAL 2 TIMES DAILY
Status: DISCONTINUED | OUTPATIENT
Start: 2024-08-31 | End: 2024-08-31

## 2024-08-31 RX ORDER — ASPIRIN 81 MG/1
81 TABLET ORAL DAILY
Status: DISCONTINUED | OUTPATIENT
Start: 2024-09-01 | End: 2024-09-02 | Stop reason: HOSPADM

## 2024-08-31 RX ORDER — ACETAMINOPHEN 650 MG/1
650 SUPPOSITORY RECTAL EVERY 4 HOURS PRN
Status: DISCONTINUED | OUTPATIENT
Start: 2024-08-31 | End: 2024-09-02 | Stop reason: HOSPADM

## 2024-08-31 RX ORDER — DOCUSATE SODIUM 100 MG/1
100 CAPSULE, LIQUID FILLED ORAL 2 TIMES DAILY
Status: DISCONTINUED | OUTPATIENT
Start: 2024-08-31 | End: 2024-09-02 | Stop reason: HOSPADM

## 2024-08-31 RX ORDER — ACETAMINOPHEN 160 MG/5ML
650 SOLUTION ORAL EVERY 4 HOURS PRN
Status: DISCONTINUED | OUTPATIENT
Start: 2024-08-31 | End: 2024-09-02 | Stop reason: HOSPADM

## 2024-08-31 RX ORDER — DEXTROSE 50 % IN WATER (D50W) INTRAVENOUS SYRINGE
12.5
Status: DISCONTINUED | OUTPATIENT
Start: 2024-08-31 | End: 2024-09-02 | Stop reason: HOSPADM

## 2024-08-31 RX ORDER — PANTOPRAZOLE SODIUM 40 MG/10ML
40 INJECTION, POWDER, LYOPHILIZED, FOR SOLUTION INTRAVENOUS
Status: DISCONTINUED | OUTPATIENT
Start: 2024-09-01 | End: 2024-09-02 | Stop reason: HOSPADM

## 2024-08-31 RX ORDER — NITROFURANTOIN 25; 75 MG/1; MG/1
1 CAPSULE ORAL 2 TIMES DAILY
COMMUNITY

## 2024-08-31 RX ORDER — ACETAMINOPHEN 500 MG
5 TABLET ORAL NIGHTLY PRN
Status: DISCONTINUED | OUTPATIENT
Start: 2024-08-31 | End: 2024-09-02 | Stop reason: HOSPADM

## 2024-08-31 RX ORDER — HYDRALAZINE HYDROCHLORIDE 20 MG/ML
10 INJECTION INTRAMUSCULAR; INTRAVENOUS
Status: DISCONTINUED | OUTPATIENT
Start: 2024-08-31 | End: 2024-09-02 | Stop reason: HOSPADM

## 2024-08-31 RX ORDER — INSULIN LISPRO 100 [IU]/ML
0-5 INJECTION, SOLUTION INTRAVENOUS; SUBCUTANEOUS
Status: DISCONTINUED | OUTPATIENT
Start: 2024-09-01 | End: 2024-09-02 | Stop reason: HOSPADM

## 2024-08-31 RX ORDER — ATORVASTATIN CALCIUM 20 MG/1
40 TABLET, FILM COATED ORAL NIGHTLY
Status: DISCONTINUED | OUTPATIENT
Start: 2024-08-31 | End: 2024-09-01

## 2024-08-31 RX ORDER — ONDANSETRON HYDROCHLORIDE 2 MG/ML
4 INJECTION, SOLUTION INTRAVENOUS EVERY 8 HOURS PRN
Status: DISCONTINUED | OUTPATIENT
Start: 2024-08-31 | End: 2024-09-02 | Stop reason: HOSPADM

## 2024-08-31 RX ORDER — PANTOPRAZOLE SODIUM 40 MG/1
40 TABLET, DELAYED RELEASE ORAL
Status: DISCONTINUED | OUTPATIENT
Start: 2024-09-01 | End: 2024-09-02 | Stop reason: HOSPADM

## 2024-08-31 RX ORDER — ONDANSETRON 4 MG/1
4 TABLET, FILM COATED ORAL EVERY 8 HOURS PRN
Status: DISCONTINUED | OUTPATIENT
Start: 2024-08-31 | End: 2024-09-02 | Stop reason: HOSPADM

## 2024-08-31 RX ADMIN — ATORVASTATIN CALCIUM 40 MG: 20 TABLET, FILM COATED ORAL at 21:27

## 2024-08-31 RX ADMIN — Medication 5 MG: at 21:27

## 2024-08-31 RX ADMIN — NITROFURANTOIN (MONOHYDRATE/MACROCRYSTALS) 100 MG: 75; 25 CAPSULE ORAL at 22:39

## 2024-08-31 SDOH — ECONOMIC STABILITY: INCOME INSECURITY: HOW HARD IS IT FOR YOU TO PAY FOR THE VERY BASICS LIKE FOOD, HOUSING, MEDICAL CARE, AND HEATING?: NOT VERY HARD

## 2024-08-31 SDOH — SOCIAL STABILITY: SOCIAL INSECURITY: ARE YOU OR HAVE YOU BEEN THREATENED OR ABUSED PHYSICALLY, EMOTIONALLY, OR SEXUALLY BY ANYONE?: NO

## 2024-08-31 SDOH — SOCIAL STABILITY: SOCIAL INSECURITY: ARE THERE ANY APPARENT SIGNS OF INJURIES/BEHAVIORS THAT COULD BE RELATED TO ABUSE/NEGLECT?: NO

## 2024-08-31 SDOH — ECONOMIC STABILITY: HOUSING INSECURITY: AT ANY TIME IN THE PAST 12 MONTHS, WERE YOU HOMELESS OR LIVING IN A SHELTER (INCLUDING NOW)?: NO

## 2024-08-31 SDOH — ECONOMIC STABILITY: TRANSPORTATION INSECURITY
IN THE PAST 12 MONTHS, HAS LACK OF TRANSPORTATION KEPT YOU FROM MEETINGS, WORK, OR FROM GETTING THINGS NEEDED FOR DAILY LIVING?: NO

## 2024-08-31 SDOH — ECONOMIC STABILITY: INCOME INSECURITY: IN THE LAST 12 MONTHS, WAS THERE A TIME WHEN YOU WERE NOT ABLE TO PAY THE MORTGAGE OR RENT ON TIME?: NO

## 2024-08-31 SDOH — SOCIAL STABILITY: SOCIAL INSECURITY: WERE YOU ABLE TO COMPLETE ALL THE BEHAVIORAL HEALTH SCREENINGS?: YES

## 2024-08-31 SDOH — SOCIAL STABILITY: SOCIAL INSECURITY: HAVE YOU HAD THOUGHTS OF HARMING ANYONE ELSE?: NO

## 2024-08-31 SDOH — SOCIAL STABILITY: SOCIAL INSECURITY: HAS ANYONE EVER THREATENED TO HURT YOUR FAMILY OR YOUR PETS?: NO

## 2024-08-31 SDOH — SOCIAL STABILITY: SOCIAL INSECURITY: ABUSE: ADULT

## 2024-08-31 SDOH — SOCIAL STABILITY: SOCIAL INSECURITY: DO YOU FEEL ANYONE HAS EXPLOITED OR TAKEN ADVANTAGE OF YOU FINANCIALLY OR OF YOUR PERSONAL PROPERTY?: NO

## 2024-08-31 SDOH — ECONOMIC STABILITY: HOUSING INSECURITY: IN THE PAST 12 MONTHS, HOW MANY TIMES HAVE YOU MOVED WHERE YOU WERE LIVING?: 1

## 2024-08-31 SDOH — ECONOMIC STABILITY: TRANSPORTATION INSECURITY
IN THE PAST 12 MONTHS, HAS THE LACK OF TRANSPORTATION KEPT YOU FROM MEDICAL APPOINTMENTS OR FROM GETTING MEDICATIONS?: NO

## 2024-08-31 SDOH — SOCIAL STABILITY: SOCIAL INSECURITY: DO YOU FEEL UNSAFE GOING BACK TO THE PLACE WHERE YOU ARE LIVING?: NO

## 2024-08-31 SDOH — SOCIAL STABILITY: SOCIAL INSECURITY: DOES ANYONE TRY TO KEEP YOU FROM HAVING/CONTACTING OTHER FRIENDS OR DOING THINGS OUTSIDE YOUR HOME?: NO

## 2024-08-31 SDOH — SOCIAL STABILITY: SOCIAL INSECURITY: HAVE YOU HAD ANY THOUGHTS OF HARMING ANYONE ELSE?: NO

## 2024-08-31 ASSESSMENT — LIFESTYLE VARIABLES
HOW OFTEN DO YOU HAVE A DRINK CONTAINING ALCOHOL: NEVER
SKIP TO QUESTIONS 9-10: 1
AUDIT-C TOTAL SCORE: 0
EVER HAD A DRINK FIRST THING IN THE MORNING TO STEADY YOUR NERVES TO GET RID OF A HANGOVER: NO
HOW OFTEN DO YOU HAVE 6 OR MORE DRINKS ON ONE OCCASION: NEVER
EVER FELT BAD OR GUILTY ABOUT YOUR DRINKING: NO
HAVE PEOPLE ANNOYED YOU BY CRITICIZING YOUR DRINKING: NO
TOTAL SCORE: 0
HAVE YOU EVER FELT YOU SHOULD CUT DOWN ON YOUR DRINKING: NO
AUDIT-C TOTAL SCORE: 0
HOW MANY STANDARD DRINKS CONTAINING ALCOHOL DO YOU HAVE ON A TYPICAL DAY: PATIENT DOES NOT DRINK

## 2024-08-31 ASSESSMENT — ACTIVITIES OF DAILY LIVING (ADL)
FEEDING YOURSELF: INDEPENDENT
HEARING - RIGHT EAR: HEARING AID
DRESSING YOURSELF: NEEDS ASSISTANCE
ASSISTIVE_DEVICE: CANE;EYEGLASSES;HEARING AID - RIGHT;HEARING AID - LEFT
WALKS IN HOME: NEEDS ASSISTANCE
PATIENT'S MEMORY ADEQUATE TO SAFELY COMPLETE DAILY ACTIVITIES?: YES
HEARING - LEFT EAR: HEARING AID
ADEQUATE_TO_COMPLETE_ADL: YES
TOILETING: NEEDS ASSISTANCE
GROOMING: INDEPENDENT
JUDGMENT_ADEQUATE_SAFELY_COMPLETE_DAILY_ACTIVITIES: YES
BATHING: NEEDS ASSISTANCE

## 2024-08-31 ASSESSMENT — COGNITIVE AND FUNCTIONAL STATUS - GENERAL
MOVING TO AND FROM BED TO CHAIR: A LITTLE
CLIMB 3 TO 5 STEPS WITH RAILING: A LITTLE
STANDING UP FROM CHAIR USING ARMS: A LITTLE
WALKING IN HOSPITAL ROOM: A LITTLE
PATIENT BASELINE BEDBOUND: NO
HELP NEEDED FOR BATHING: A LITTLE
DAILY ACTIVITIY SCORE: 22
MOBILITY SCORE: 20
TOILETING: A LITTLE

## 2024-08-31 ASSESSMENT — PATIENT HEALTH QUESTIONNAIRE - PHQ9
2. FEELING DOWN, DEPRESSED OR HOPELESS: NOT AT ALL
SUM OF ALL RESPONSES TO PHQ9 QUESTIONS 1 & 2: 0
1. LITTLE INTEREST OR PLEASURE IN DOING THINGS: NOT AT ALL

## 2024-08-31 ASSESSMENT — COLUMBIA-SUICIDE SEVERITY RATING SCALE - C-SSRS
2. HAVE YOU ACTUALLY HAD ANY THOUGHTS OF KILLING YOURSELF?: NO
1. IN THE PAST MONTH, HAVE YOU WISHED YOU WERE DEAD OR WISHED YOU COULD GO TO SLEEP AND NOT WAKE UP?: NO
6. HAVE YOU EVER DONE ANYTHING, STARTED TO DO ANYTHING, OR PREPARED TO DO ANYTHING TO END YOUR LIFE?: NO

## 2024-08-31 ASSESSMENT — PAIN SCALES - GENERAL
PAINLEVEL_OUTOF10: 0 - NO PAIN

## 2024-08-31 ASSESSMENT — PAIN - FUNCTIONAL ASSESSMENT: PAIN_FUNCTIONAL_ASSESSMENT: 0-10

## 2024-08-31 NOTE — ED PROVIDER NOTES
HPI   No chief complaint on file.      84-year-old female presents from home by private vehicle with report of a sudden onset of right hand weakness.  The symptoms occurred about 20 minutes prior to arrival.  She was in the bathroom attempting to comb her hair when her hand would not hold onto the brush.  She was brought to the hospital by family members.  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  She denied any headache, chest pain, palpitations, shortness of breath, dizziness or near syncope prior to the event.  Cording to the patient's EMR she has a history of MCA infarct with left hemiparesis and benign positional vertigo as diagnosed by her neurologist .  She denies any recent fevers, chills, nausea or vomiting.  She does have a past medical history of hypertension, benign paroxysmal positional vertigo, bilateral carotid artery stenosis, bilateral sensorineural hearing loss, CAD status post angioplasty, cervicalgia, dyslipidemia, GERD, hyperlipidemia, left trickle hypertrophy, postural hypotension, tinnitus.  Stroke alert was initiated at 1759 hrs.  Patient's initial NIH is 0            Patient History   Past Medical History:   Diagnosis Date    Transient cerebral ischemic attack, unspecified     Mini stroke     Past Surgical History:   Procedure Laterality Date    OTHER SURGICAL HISTORY  06/21/2022    Breast biopsy    OTHER SURGICAL HISTORY  06/21/2022    Cardiac catheterization with stent placement    OTHER SURGICAL HISTORY  06/21/2022    Dermatological surgery    OTHER SURGICAL HISTORY  06/21/2022    Dilation and curettage    OTHER SURGICAL HISTORY  06/21/2022    Tonsillectomy    OTHER SURGICAL HISTORY  06/21/2022    Lithotripsy     No family history on file.  Social History     Tobacco Use    Smoking status: Former     Current packs/day: 0.00     Types: Cigarettes     Quit date: 1999     Years  since quittin.6    Smokeless tobacco: Never   Vaping Use    Vaping status: Never Used   Substance Use Topics    Alcohol use: Yes     Comment: 2-3x a week    Drug use: Never       Physical Exam   ED Triage Vitals   Temp Pulse Resp BP   -- -- -- --      SpO2 Temp src Heart Rate Source Patient Position   -- -- -- --      BP Location FiO2 (%)     -- --       Physical Exam  Vitals and nursing note reviewed.   Constitutional:       General: She is awake. She is not in acute distress.     Appearance: Normal appearance. She is well-developed, well-groomed and normal weight. She is not ill-appearing, toxic-appearing or diaphoretic.   HENT:      Head: Normocephalic and atraumatic.      Right Ear: Tympanic membrane, ear canal and external ear normal.      Left Ear: Tympanic membrane, ear canal and external ear normal.      Nose: Nose normal.      Mouth/Throat:      Mouth: Mucous membranes are moist.      Pharynx: Oropharynx is clear.   Eyes:      Extraocular Movements: Extraocular movements intact.      Conjunctiva/sclera: Conjunctivae normal.      Pupils: Pupils are equal, round, and reactive to light.   Cardiovascular:      Rate and Rhythm: Normal rate and regular rhythm.      Pulses: Normal pulses.      Heart sounds: Normal heart sounds.   Pulmonary:      Effort: Pulmonary effort is normal.      Breath sounds: Normal breath sounds. No wheezing, rhonchi or rales.   Abdominal:      General: Abdomen is flat. Bowel sounds are normal.      Palpations: Abdomen is soft. There is no mass.      Tenderness: There is no abdominal tenderness. There is no guarding.   Musculoskeletal:         General: No swelling or tenderness. Normal range of motion.      Cervical back: Normal range of motion and neck supple.   Lymphadenopathy:      Cervical: No cervical adenopathy.   Skin:     General: Skin is warm and dry.      Capillary Refill: Capillary refill takes less than 2 seconds.      Findings: No rash.   Neurological:      General: No  focal deficit present.      Mental Status: She is alert and oriented to person, place, and time. Mental status is at baseline.      GCS: GCS eye subscore is 4. GCS verbal subscore is 5. GCS motor subscore is 6.      Cranial Nerves: No cranial nerve deficit, dysarthria or facial asymmetry.      Sensory: Sensation is intact. No sensory deficit.      Motor: Motor function is intact. No weakness, tremor, abnormal muscle tone or pronator drift.      Coordination: Coordination is intact. Finger-Nose-Finger Test and Heel to Shin Test normal.      Comments: The patient's gait was not tested. The patient has good shoulder shrug, muscle strength is 5/5 throughout no abnormal movements were noted on exam.  Patient's  is equal bilaterally.  Sensation intact bilaterally.  Patient does have diminished hearing bilaterally with a known history of sensory urinary hearing loss.     Psychiatric:         Mood and Affect: Mood normal.         Behavior: Behavior normal. Behavior is cooperative.         Thought Content: Thought content normal.         Judgment: Judgment normal.           ED Course & MDM   Diagnoses as of 08/31/24 1908   TIA (transient ischemic attack)                 No data recorded             NIH Stroke Scale: 0 (08/31/24 1800 : Joss Reyes PA-C)                   Medical Decision Making  Stroke alert was initiated at 1759 hrs.  The patient's initial NIH is 0.  CBC white count 9.2 hemoglobin 12.9 hematocrit 40.9 platelet count was 302, comprehensive potassium 3.4 GFR 59 otherwise unremarkable, PT 11.9 INR 1.1 PTT was 30, troponin was negative at 4, noncontrast CT scan of the head shows no acute intracranial hemorrhage or acute territorial infarct, diffuse parenchymal volume loss chronic microvascular ischemic changes.  Repeat blood pressure is now 135/70 heart rate 56 respirations 18 pulse ox is 96% on room air  1905 hrs.: Rounded on the patient to discuss results of workup with the patient and family  members.  The family states that she has noticed that the patient seems to have problems getting words out.  On my examination the patient's speech is clear on repeat exam.  1923 hrs. spoke with Yojana CARTER hospitalist and the patient will be excepted to Dr. Keyes's service.  The patient agrees with the plan for admission.        Procedure  Procedures     Joss Reyes PA-C  08/31/24 1924

## 2024-08-31 NOTE — H&P
Medical Group History and Physical    ASSESSMENT & PLAN:     TIA  Right hand weakness  Right facial numbness  Expressive asphasia  Hx CVA [06/24/2019]  - consult Neuro - follows with Dr Rivera  - MRI/MRA in AM  - speech/swallow eval  - NIHSS 0  - statin lipitor 20mg  - cont DAPT with ASA and Plavix    Hx HTN  Hx CAD s/p NORBERT to LAD and L Cx 2019  - cont home meds slowly -> amlodipine, atenolol, hydrochlorothiazide  - last seen by Cardiology 8/21/2024 -> Dr Naqvi  - NEW medication stated this month by Cardiology - Hydralazine 25 BID, and Metformin XR 500mg   - holding metformin for now, SSI and Accu checks AC/HS  - last A1c 6.6% in June 2024    Current UTI  - cont antibiotics - family to call in w prescription details, resume     Insomnia - trazadone 25mg nightly    VTE Prophylaxis: asa and plavix    JESSE Koch-CNP    HISTORY OF PRESENT ILLNESS:   Chief Complaint: Right hand weakness and Right facial numbeness    History Of Present Illness:    Stefany France is a 84 y.o. female with a significant past medical history of CVA, BPPV, HLD, HTN, hearing loss presenting to [place] [facility] with family for c/o right hand weakness and right facial numbness that began around 4p this afternoon. Glucose 114 on arrival, She denies any double vision but says she was having difficulty reaching for the hair brush, grabbing it, while moving the hand with coordination to complete task. Also states she was having Right facial numbness that has since resolved; NIHSS 0, mild tongue deviation to the right otherwise CN II-XII all intact. Also mentioned recent UTI, she is taking antibiotics, new nausea and diarrhea as well.     No fevers or chills, no vomiting or abdominal pain. Denies any chest pain but did say yesterday she felt like she needed to take multiple deep breaths to resolve shortness of breath, denies any exertion prior to event, pOx stable today no new c/o shortness of breath or chest pain this visit. Last seen by  cardiology on 8/21/2024 with new home med started Hydralazine and metformin; Lab work is fairly unremarkable k 3.4 she takes PO supplements daily, renal function is at baseline GFR 59, and head CT is negative. Last A1c 6.6% in June 2024. last meal 4p today.     VSS and symptoms seem to have resolved at this point but will admit for MRI/MRA and neuro eval in AM.      Review of systems: 10 point review of systems is otherwise negative except as mentioned above.    PAST HISTORIES:       Past Medical History:  Medical Problems       Problem List       Benign essential hypertension    Benign paroxysmal positional vertigo    Bilateral carotid artery stenosis    Bilateral impacted cerumen    Bilateral sensorineural hearing loss    Bradycardia    CAD S/P percutaneous coronary angioplasty    Cervicalgia    Chronic rhinitis    CVA (cerebral vascular accident) (Multi)    Dyslipidemia    Ear pressure    Gastroesophageal reflux disease    Hyperlipidemia    Hypokalemia    Lightheaded    LVH (left ventricular hypertrophy)    Nonspecific abnormal results of liver function study    Overview Signed 12/5/2023  1:10 PM by Angelina Santacruz     Formatting of this note might be different from the original. 2/06-AST 58, ALT 81 US liver-5/06-fatty infiltration-NOIC         Obesity    Postural hypotension    Presbycusis, bilateral    Tinnitus, bilateral    Vertigo    BMI 30.0-30.9,adult    Former smoker           Past Surgical History:  Past Surgical History:   Procedure Laterality Date    OTHER SURGICAL HISTORY  06/21/2022    Breast biopsy    OTHER SURGICAL HISTORY  06/21/2022    Cardiac catheterization with stent placement    OTHER SURGICAL HISTORY  06/21/2022    Dermatological surgery    OTHER SURGICAL HISTORY  06/21/2022    Dilation and curettage    OTHER SURGICAL HISTORY  06/21/2022    Tonsillectomy    OTHER SURGICAL HISTORY  06/21/2022    Lithotripsy          Social History:  She reports that she quit smoking about 25 years ago. Her  "smoking use included cigarettes. She has never used smokeless tobacco. She reports current alcohol use. She reports that she does not use drugs.    Family History:  No family history on file.     Allergies:  Ace inhibitors    OBJECTIVE:       Last Recorded Vitals:  Vitals:    08/31/24 1803 08/31/24 1815 08/31/24 1830 08/31/24 1845   BP: 159/69 155/73 145/77 135/70   Pulse: 66 63 58 56   Resp: 18 18 18 18   Temp: 37.1 °C (98.8 °F)      SpO2: 97% 97% 96% 96%   Weight: 79.4 kg (175 lb)      Height: 1.626 m (5' 4\")          Last I/O:  No intake/output data recorded.    Physical Exam  Vitals and nursing note reviewed.   Constitutional:       Appearance: Normal appearance. She is normal weight.   HENT:      Head: Normocephalic and atraumatic.      Nose: Nose normal.      Mouth/Throat:      Mouth: Mucous membranes are dry.      Pharynx: Oropharynx is clear.   Eyes:      Extraocular Movements: Extraocular movements intact.      Conjunctiva/sclera: Conjunctivae normal.      Pupils: Pupils are equal, round, and reactive to light.      Comments: Nystagmus    Cardiovascular:      Rate and Rhythm: Normal rate and regular rhythm.      Pulses: Normal pulses.      Heart sounds: Normal heart sounds.   Pulmonary:      Effort: Pulmonary effort is normal.      Breath sounds: Normal breath sounds.      Comments: LLL with mild crackles, cleared with cough  Abdominal:      General: Abdomen is flat. Bowel sounds are normal.      Palpations: Abdomen is soft.   Genitourinary:     Comments: Urinary incontinence  Musculoskeletal:         General: Normal range of motion.      Cervical back: Normal range of motion and neck supple.      Comments: No edema   Skin:     General: Skin is warm and dry.      Capillary Refill: Capillary refill takes less than 2 seconds.   Neurological:      General: No focal deficit present.      Mental Status: She is alert and oriented to person, place, and time. Mental status is at baseline.   Psychiatric:         " Mood and Affect: Mood normal.         Behavior: Behavior normal.         Thought Content: Thought content normal.         Judgment: Judgment normal.           Scheduled Medications    PRN Medications    Continuous Medications      Outpatient Medications:  Prior to Admission medications    Medication Sig Start Date End Date Taking? Authorizing Provider   amLODIPine (Norvasc) 10 mg tablet Take 1 tablet (10 mg) by mouth once daily. 11/17/17   Historical Provider, MD   atenolol (Tenormin) 25 mg tablet Take 1 tablet (25 mg) by mouth 2 times a day.    Historical Provider, MD   atorvastatin (Lipitor) 20 mg tablet Take 1 tablet (20 mg) by mouth once daily. 5/16/24 5/16/25  Juwan Naqvi DO   clopidogrel (Plavix) 75 mg tablet Take 1 tablet (75 mg) by mouth once daily. 6/24/24 6/24/25  Duke Walters MD   FLUoxetine (PROzac) 40 mg capsule Take 1 capsule (40 mg) by mouth once daily.    Historical Provider, MD   fluticasone (Flonase) 50 mcg/actuation nasal spray SPRAY 2 SPRAYS IN EACH NOSTRIL DAILY    Historical Provider, MD   hydrALAZINE (Apresoline) 25 mg tablet Take 1 tablet (25 mg) by mouth 2 times a day. 8/21/24 8/21/25  Juwan Naqvi,    hydroCHLOROthiazide (HYDRODiuril) 25 mg tablet Take 1 tablet (25 mg) by mouth once daily. 8/7/24 8/7/25  Juwan Naqvi DO   metFORMIN  mg 24 hr tablet Take 1 tablet (500 mg) by mouth once daily. 1/14/24   Historical Provider, MD   omeprazole OTC (PriLOSEC OTC) 20 mg EC tablet 1 tablet (20 mg). 6/29/22   Historical Provider, MD   potassium chloride CR 10 mEq ER tablet Take 1 tablet (10 mEq) by mouth 2 times a day.    Historical Provider, MD   traZODone (Desyrel) 50 mg tablet Take 1 tablet (50 mg) by mouth once daily at bedtime.    Historical Provider, MD       LABS AND IMAGING:     Labs:  Results for orders placed or performed during the hospital encounter of 08/31/24 (from the past 24 hour(s))   CBC and Auto Differential   Result Value Ref Range    WBC 9.2 4.4  - 11.3 x10*3/uL    nRBC 0.0 0.0 - 0.0 /100 WBCs    RBC 5.14 4.00 - 5.20 x10*6/uL    Hemoglobin 12.9 12.0 - 16.0 g/dL    Hematocrit 40.9 36.0 - 46.0 %    MCV 80 80 - 100 fL    MCH 25.1 (L) 26.0 - 34.0 pg    MCHC 31.5 (L) 32.0 - 36.0 g/dL    RDW 15.7 (H) 11.5 - 14.5 %    Platelets 302 150 - 450 x10*3/uL    Neutrophils % 57.9 40.0 - 80.0 %    Immature Granulocytes %, Automated 0.2 0.0 - 0.9 %    Lymphocytes % 27.1 13.0 - 44.0 %    Monocytes % 14.0 2.0 - 10.0 %    Eosinophils % 0.0 0.0 - 6.0 %    Basophils % 0.8 0.0 - 2.0 %    Neutrophils Absolute 5.34 1.60 - 5.50 x10*3/uL    Immature Granulocytes Absolute, Automated 0.02 0.00 - 0.50 x10*3/uL    Lymphocytes Absolute 2.50 0.80 - 3.00 x10*3/uL    Monocytes Absolute 1.29 (H) 0.05 - 0.80 x10*3/uL    Eosinophils Absolute 0.00 0.00 - 0.40 x10*3/uL    Basophils Absolute 0.07 0.00 - 0.10 x10*3/uL   Comprehensive metabolic panel   Result Value Ref Range    Glucose 98 74 - 99 mg/dL    Sodium 137 136 - 145 mmol/L    Potassium 3.4 (L) 3.5 - 5.3 mmol/L    Chloride 103 98 - 107 mmol/L    Bicarbonate 23 21 - 32 mmol/L    Anion Gap 14 10 - 20 mmol/L    Urea Nitrogen 19 6 - 23 mg/dL    Creatinine 0.96 0.50 - 1.05 mg/dL    eGFR 59 (L) >60 mL/min/1.73m*2    Calcium 9.0 8.6 - 10.3 mg/dL    Albumin 4.0 3.4 - 5.0 g/dL    Alkaline Phosphatase 107 33 - 136 U/L    Total Protein 6.7 6.4 - 8.2 g/dL    AST 22 9 - 39 U/L    Bilirubin, Total 0.6 0.0 - 1.2 mg/dL    ALT 27 7 - 45 U/L   Troponin I, High Sensitivity   Result Value Ref Range    Troponin I, High Sensitivity 4 0 - 13 ng/L   Protime-INR   Result Value Ref Range    Protime 11.9 9.8 - 12.8 seconds    INR 1.1 0.9 - 1.1   APTT   Result Value Ref Range    aPTT 30 27 - 38 seconds   POCT GLUCOSE   Result Value Ref Range    POCT Glucose 114 (H) 74 - 99 mg/dL   ECG 12 lead   Result Value Ref Range    Ventricular Rate 58 BPM    Atrial Rate 58 BPM    MI Interval 146 ms    QRS Duration 76 ms    QT Interval 446 ms    QTC Calculation(Bazett) 437 ms    P  Axis 71 degrees    R Axis 33 degrees    T Axis 36 degrees    QRS Count 9 beats    Q Onset 221 ms    P Onset 148 ms    P Offset 194 ms    T Offset 444 ms    QTC Fredericia 440 ms        Imaging:  CT brain attack head wo IV contrast   Final Result   1.  No acute intracranial hemorrhage or acute territorial infarct.   2.  Diffuse parenchymal volume loss. Chronic microvascular ischemic   changes.             MACRO:   Davida Thomson discussed the significance and urgency of this   critical finding by telephone with  SUKHDEV ERNST on 8/31/2024 at   6:34 pm.  (**-RCF-**) Findings:  See findings.        Signed by: Davida Thomson 8/31/2024 6:34 PM   Dictation workstation:   [id number]

## 2024-09-01 ENCOUNTER — APPOINTMENT (OUTPATIENT)
Dept: RADIOLOGY | Facility: HOSPITAL | Age: 84
DRG: 069 | End: 2024-09-01
Payer: MEDICARE

## 2024-09-01 LAB
ALBUMIN SERPL BCP-MCNC: 3.4 G/DL (ref 3.4–5)
ALP SERPL-CCNC: 91 U/L (ref 33–136)
ALT SERPL W P-5'-P-CCNC: 21 U/L (ref 7–45)
ANION GAP SERPL CALC-SCNC: 11 MMOL/L (ref 10–20)
APPEARANCE UR: ABNORMAL
AST SERPL W P-5'-P-CCNC: 17 U/L (ref 9–39)
ATRIAL RATE: 58 BPM
BACTERIA #/AREA URNS AUTO: ABNORMAL /HPF
BASOPHILS # BLD AUTO: 0.05 X10*3/UL (ref 0–0.1)
BASOPHILS NFR BLD AUTO: 0.9 %
BILIRUB SERPL-MCNC: 0.7 MG/DL (ref 0–1.2)
BILIRUB UR STRIP.AUTO-MCNC: NEGATIVE MG/DL
BUN SERPL-MCNC: 18 MG/DL (ref 6–23)
CALCIUM SERPL-MCNC: 8.6 MG/DL (ref 8.6–10.3)
CHLORIDE SERPL-SCNC: 105 MMOL/L (ref 98–107)
CO2 SERPL-SCNC: 28 MMOL/L (ref 21–32)
COLOR UR: YELLOW
CREAT SERPL-MCNC: 0.9 MG/DL (ref 0.5–1.05)
EGFRCR SERPLBLD CKD-EPI 2021: 64 ML/MIN/1.73M*2
EOSINOPHIL # BLD AUTO: 0.12 X10*3/UL (ref 0–0.4)
EOSINOPHIL NFR BLD AUTO: 2.1 %
ERYTHROCYTE [DISTWIDTH] IN BLOOD BY AUTOMATED COUNT: 15.8 % (ref 11.5–14.5)
GLUCOSE BLD MANUAL STRIP-MCNC: 110 MG/DL (ref 74–99)
GLUCOSE BLD MANUAL STRIP-MCNC: 123 MG/DL (ref 74–99)
GLUCOSE BLD MANUAL STRIP-MCNC: 131 MG/DL (ref 74–99)
GLUCOSE BLD MANUAL STRIP-MCNC: 166 MG/DL (ref 74–99)
GLUCOSE SERPL-MCNC: 127 MG/DL (ref 74–99)
GLUCOSE UR STRIP.AUTO-MCNC: NORMAL MG/DL
HCT VFR BLD AUTO: 36.3 % (ref 36–46)
HGB BLD-MCNC: 11.5 G/DL (ref 12–16)
HOLD SPECIMEN: NORMAL
IMM GRANULOCYTES # BLD AUTO: 0.01 X10*3/UL (ref 0–0.5)
IMM GRANULOCYTES NFR BLD AUTO: 0.2 % (ref 0–0.9)
KETONES UR STRIP.AUTO-MCNC: ABNORMAL MG/DL
LEUKOCYTE ESTERASE UR QL STRIP.AUTO: ABNORMAL
LYMPHOCYTES # BLD AUTO: 1.08 X10*3/UL (ref 0.8–3)
LYMPHOCYTES NFR BLD AUTO: 18.7 %
MAGNESIUM SERPL-MCNC: 1.73 MG/DL (ref 1.6–2.4)
MCH RBC QN AUTO: 25.6 PG (ref 26–34)
MCHC RBC AUTO-ENTMCNC: 31.7 G/DL (ref 32–36)
MCV RBC AUTO: 81 FL (ref 80–100)
MONOCYTES # BLD AUTO: 0.8 X10*3/UL (ref 0.05–0.8)
MONOCYTES NFR BLD AUTO: 13.8 %
NEUTROPHILS # BLD AUTO: 3.72 X10*3/UL (ref 1.6–5.5)
NEUTROPHILS NFR BLD AUTO: 64.3 %
NITRITE UR QL STRIP.AUTO: NEGATIVE
NRBC BLD-RTO: 0 /100 WBCS (ref 0–0)
P AXIS: 71 DEGREES
P OFFSET: 194 MS
P ONSET: 148 MS
PH UR STRIP.AUTO: 5.5 [PH]
PLATELET # BLD AUTO: 221 X10*3/UL (ref 150–450)
POTASSIUM SERPL-SCNC: 3.7 MMOL/L (ref 3.5–5.3)
PR INTERVAL: 146 MS
PROT SERPL-MCNC: 5.9 G/DL (ref 6.4–8.2)
PROT UR STRIP.AUTO-MCNC: ABNORMAL MG/DL
Q ONSET: 221 MS
QRS COUNT: 9 BEATS
QRS DURATION: 76 MS
QT INTERVAL: 446 MS
QTC CALCULATION(BAZETT): 437 MS
QTC FREDERICIA: 440 MS
R AXIS: 33 DEGREES
RBC # BLD AUTO: 4.49 X10*6/UL (ref 4–5.2)
RBC # UR STRIP.AUTO: ABNORMAL /UL
RBC #/AREA URNS AUTO: ABNORMAL /HPF
SODIUM SERPL-SCNC: 140 MMOL/L (ref 136–145)
SP GR UR STRIP.AUTO: 1.02
SQUAMOUS #/AREA URNS AUTO: ABNORMAL /HPF
T AXIS: 36 DEGREES
T OFFSET: 444 MS
UROBILINOGEN UR STRIP.AUTO-MCNC: NORMAL MG/DL
VENTRICULAR RATE: 58 BPM
WBC # BLD AUTO: 5.8 X10*3/UL (ref 4.4–11.3)
WBC #/AREA URNS AUTO: >50 /HPF
WBC CLUMPS #/AREA URNS AUTO: ABNORMAL /HPF

## 2024-09-01 PROCEDURE — 70551 MRI BRAIN STEM W/O DYE: CPT | Performed by: RADIOLOGY

## 2024-09-01 PROCEDURE — 2500000002 HC RX 250 W HCPCS SELF ADMINISTERED DRUGS (ALT 637 FOR MEDICARE OP, ALT 636 FOR OP/ED): Performed by: NURSE PRACTITIONER

## 2024-09-01 PROCEDURE — 92610 EVALUATE SWALLOWING FUNCTION: CPT | Mod: GN | Performed by: SPEECH-LANGUAGE PATHOLOGIST

## 2024-09-01 PROCEDURE — 70498 CT ANGIOGRAPHY NECK: CPT

## 2024-09-01 PROCEDURE — 70496 CT ANGIOGRAPHY HEAD: CPT | Performed by: RADIOLOGY

## 2024-09-01 PROCEDURE — 99223 1ST HOSP IP/OBS HIGH 75: CPT | Performed by: STUDENT IN AN ORGANIZED HEALTH CARE EDUCATION/TRAINING PROGRAM

## 2024-09-01 PROCEDURE — 36415 COLL VENOUS BLD VENIPUNCTURE: CPT | Performed by: NURSE PRACTITIONER

## 2024-09-01 PROCEDURE — 99232 SBSQ HOSP IP/OBS MODERATE 35: CPT

## 2024-09-01 PROCEDURE — 70498 CT ANGIOGRAPHY NECK: CPT | Performed by: RADIOLOGY

## 2024-09-01 PROCEDURE — 70547 MR ANGIOGRAPHY NECK W/O DYE: CPT

## 2024-09-01 PROCEDURE — 70551 MRI BRAIN STEM W/O DYE: CPT

## 2024-09-01 PROCEDURE — 2500000004 HC RX 250 GENERAL PHARMACY W/ HCPCS (ALT 636 FOR OP/ED)

## 2024-09-01 PROCEDURE — 2550000001 HC RX 255 CONTRASTS: Performed by: STUDENT IN AN ORGANIZED HEALTH CARE EDUCATION/TRAINING PROGRAM

## 2024-09-01 PROCEDURE — 82947 ASSAY GLUCOSE BLOOD QUANT: CPT

## 2024-09-01 PROCEDURE — 96372 THER/PROPH/DIAG INJ SC/IM: CPT

## 2024-09-01 PROCEDURE — 85025 COMPLETE CBC W/AUTO DIFF WBC: CPT | Performed by: NURSE PRACTITIONER

## 2024-09-01 PROCEDURE — 83735 ASSAY OF MAGNESIUM: CPT | Performed by: NURSE PRACTITIONER

## 2024-09-01 PROCEDURE — 70547 MR ANGIOGRAPHY NECK W/O DYE: CPT | Performed by: RADIOLOGY

## 2024-09-01 PROCEDURE — 81001 URINALYSIS AUTO W/SCOPE: CPT

## 2024-09-01 PROCEDURE — 2500000001 HC RX 250 WO HCPCS SELF ADMINISTERED DRUGS (ALT 637 FOR MEDICARE OP): Performed by: NURSE PRACTITIONER

## 2024-09-01 PROCEDURE — G0378 HOSPITAL OBSERVATION PER HR: HCPCS

## 2024-09-01 PROCEDURE — 80053 COMPREHEN METABOLIC PANEL: CPT | Performed by: NURSE PRACTITIONER

## 2024-09-01 PROCEDURE — 70544 MR ANGIOGRAPHY HEAD W/O DYE: CPT

## 2024-09-01 RX ORDER — METFORMIN HYDROCHLORIDE 500 MG/1
500 TABLET, EXTENDED RELEASE ORAL DAILY
Status: DISCONTINUED | OUTPATIENT
Start: 2024-09-01 | End: 2024-09-02

## 2024-09-01 RX ORDER — ATORVASTATIN CALCIUM 20 MG/1
20 TABLET, FILM COATED ORAL NIGHTLY
Status: DISCONTINUED | OUTPATIENT
Start: 2024-09-01 | End: 2024-09-01 | Stop reason: SDUPTHER

## 2024-09-01 RX ORDER — CLOPIDOGREL BISULFATE 75 MG/1
75 TABLET ORAL DAILY
Status: DISCONTINUED | OUTPATIENT
Start: 2024-09-01 | End: 2024-09-02 | Stop reason: HOSPADM

## 2024-09-01 RX ORDER — HYDRALAZINE HYDROCHLORIDE 25 MG/1
25 TABLET, FILM COATED ORAL 2 TIMES DAILY
Status: DISCONTINUED | OUTPATIENT
Start: 2024-09-01 | End: 2024-09-02 | Stop reason: HOSPADM

## 2024-09-01 RX ORDER — ATORVASTATIN CALCIUM 20 MG/1
20 TABLET, FILM COATED ORAL NIGHTLY
Status: DISCONTINUED | OUTPATIENT
Start: 2024-09-01 | End: 2024-09-02 | Stop reason: HOSPADM

## 2024-09-01 RX ORDER — ATENOLOL 25 MG/1
25 TABLET ORAL 2 TIMES DAILY
Status: DISCONTINUED | OUTPATIENT
Start: 2024-09-01 | End: 2024-09-02 | Stop reason: HOSPADM

## 2024-09-01 RX ORDER — HYDROCHLOROTHIAZIDE 25 MG/1
25 TABLET ORAL DAILY
Status: DISCONTINUED | OUTPATIENT
Start: 2024-09-01 | End: 2024-09-02 | Stop reason: HOSPADM

## 2024-09-01 RX ORDER — CLOPIDOGREL BISULFATE 75 MG/1
75 TABLET ORAL DAILY
Status: DISCONTINUED | OUTPATIENT
Start: 2024-09-01 | End: 2024-09-01 | Stop reason: SDUPTHER

## 2024-09-01 RX ORDER — POTASSIUM CHLORIDE 750 MG/1
10 TABLET, FILM COATED, EXTENDED RELEASE ORAL 2 TIMES DAILY
Status: DISCONTINUED | OUTPATIENT
Start: 2024-09-01 | End: 2024-09-02 | Stop reason: HOSPADM

## 2024-09-01 RX ORDER — ENOXAPARIN SODIUM 100 MG/ML
40 INJECTION SUBCUTANEOUS EVERY 24 HOURS
Status: DISCONTINUED | OUTPATIENT
Start: 2024-09-01 | End: 2024-09-02 | Stop reason: HOSPADM

## 2024-09-01 RX ADMIN — ENOXAPARIN SODIUM 40 MG: 40 INJECTION SUBCUTANEOUS at 17:24

## 2024-09-01 RX ADMIN — NITROFURANTOIN (MONOHYDRATE/MACROCRYSTALS) 100 MG: 75; 25 CAPSULE ORAL at 08:24

## 2024-09-01 RX ADMIN — CLOPIDOGREL BISULFATE 75 MG: 75 TABLET, FILM COATED ORAL at 20:35

## 2024-09-01 RX ADMIN — ATORVASTATIN CALCIUM 20 MG: 20 TABLET, FILM COATED ORAL at 20:36

## 2024-09-01 RX ADMIN — ATENOLOL 25 MG: 25 TABLET ORAL at 20:35

## 2024-09-01 RX ADMIN — PANTOPRAZOLE SODIUM 40 MG: 40 TABLET, DELAYED RELEASE ORAL at 05:38

## 2024-09-01 RX ADMIN — ASPIRIN 81 MG: 81 TABLET, COATED ORAL at 08:24

## 2024-09-01 RX ADMIN — POTASSIUM CHLORIDE 10 MEQ: 750 TABLET, EXTENDED RELEASE ORAL at 20:35

## 2024-09-01 RX ADMIN — Medication 5 MG: at 20:36

## 2024-09-01 RX ADMIN — NITROFURANTOIN (MONOHYDRATE/MACROCRYSTALS) 100 MG: 75; 25 CAPSULE ORAL at 20:36

## 2024-09-01 RX ADMIN — IOHEXOL 75 ML: 350 INJECTION, SOLUTION INTRAVENOUS at 21:10

## 2024-09-01 RX ADMIN — HYDROCHLOROTHIAZIDE 25 MG: 25 TABLET ORAL at 20:35

## 2024-09-01 ASSESSMENT — COGNITIVE AND FUNCTIONAL STATUS - GENERAL
MOBILITY SCORE: 23
DAILY ACTIVITIY SCORE: 24
CLIMB 3 TO 5 STEPS WITH RAILING: A LITTLE

## 2024-09-01 ASSESSMENT — PAIN - FUNCTIONAL ASSESSMENT: PAIN_FUNCTIONAL_ASSESSMENT: 0-10

## 2024-09-01 ASSESSMENT — PAIN SCALES - GENERAL: PAINLEVEL_OUTOF10: 0 - NO PAIN

## 2024-09-01 NOTE — PROGRESS NOTES
Speech-Language Pathology    SLP Adult Inpatient Speech-Language Pathology Clinical Swallow Evaluation    Patient Name: Stefany France  MRN: [id number]  Today's Date: 9/1/2024   Time Calculation  Start Time: 0716  Stop Time: 0729  Time Calculation (min): 13 min         Current Problem:   1. TIA (transient ischemic attack)            SLP Assessment:  Pt presents with swallowing abilities WFL at this time.    Skilled SLP services are not warranted due to no overt s/s aspiration/penetration and no swallowing concerns at this time.    SLP TX Intervention Outcomes: Evaluation only  Treatment Tolerance: Patient tolerated treatment well  Medical Staff Made Aware: Yes  Strengths: Cognition  Barriers: N/A    Plan:  Inpatient/Swing Bed or Outpatient: Inpatient  Treatment/Interventions:   SLP Plan: Evaluation Only  Diet Recommendations:   Solid Consistency: Regular  Liquid Consistency: Thin  Discussed POC: Patient, Nursing  Discussed Risks/Benefits: Yes  Patient/Caregiver Agreeable: Yes    Subjective   Current Problem:  84 y.o. female presenting to [place] ER with family for c/o right hand weakness and right facial numbness that began around 4p this afternoon. Also states she was having Right facial numbness that has since resolved; NIHSS 0, mild tongue deviation to the right otherwise CN II-XII all intact. Also mentioned recent UTI, she is taking antibiotics, new nausea and diarrhea as well.     General Visit Information:  Chart Reviewed: Yes  Patient Class: Inpatient  Ordering Physician: Alex  Reason for Referral: dysphagia  Referred By: Alex  Past Medical History Relevant to Rehab: CVA, BPPV, HLD, HTN, hearing loss  Prior Level of Function: WFL  [employer] Diet: Regular/Thin  Current Diet : Regular/Thin  Prior to Session Communication: Nursing    Objective:  Pain:  Pain Assessment: 0-10  Pain Score: 0  Dysphagia Diagnosis: WFL    Baseline Assessment:  Behavior/Cognition: WFL  Patient Positioning: Upright in bed  Baseline  Vocal Quality: WFL  Volitional Cough: WFL  Volitional Swallow: WFL      Oral/Motor Assessment:  Vocal Quality: WFL  Vocal Quality Impairment: N/A  Breath Support: WFL  Management of secretions: WFL  Lingual ROM and strength: minimal deficit with lingual elevation and slight lingual deviation to right  Labial ROM and strength: WFL  Dentition: Natural, adequate    Consistencies Trialed:  Consistencies Trialed: Yes  Consistencies Trialed: Thin, regular, mechanical soft      Clinical Observations:  Patient Positioning: Upright in bed  Management of Oral Secretions: WFL  Was The 3 oz Swallow Protocol Completed: Yes- Pass  Signs/Symptoms of Aspiration: N/A  Prolonged Oral Manipulation: N/A  Immediate Cough: N/A  Delayed Cough: N/A    Inpatient Education:  Education Documentation  Pt educated on results of swallow evaluation, diet recommendations, and no dysphagia tx recommended.  Education Comments  Pt reported understanding and agreement.    Recommendations:  Risk for Aspiration: No  Additional Recommendations: N/A  Solid Diet Recommendations : Regular  Liquid Diet Recommendations: Thin  Compensatory Swallowing Strategies: alternating bites/sips, small bites/sips, reduced rate of intake, upright positioning    Consultations/Referrals/Coordination of Services: N/A

## 2024-09-01 NOTE — PROGRESS NOTES
"Daily Progress Note    Stefany France is a 84 y.o. female on day 0 of admission presenting with TIA (transient ischemic attack).    Subjective   Patient seen resting comfortably in bed.  Patient states right arm numbness tingling much better however still having mild numbness.  Family at bedside.  Pending neurology recommendations and MRI/MRI.  Patient denies chest pain or shortness of breath.     Objective     Physical Exam    Physical Exam  Constitutional:       Appearance: Normal appearance.   HENT:      Head: Normocephalic.      Mouth/Throat:      Mouth: Mucous membranes are moist.   Eyes:      Pupils: Pupils are equal, round, and reactive to light.   Cardiovascular:      Rate and Rhythm: Normal rate and regular rhythm.      Heart sounds: Normal heart sounds, S1 normal and S2 normal.   Pulmonary:      Effort: Pulmonary effort is normal.      Breath sounds: Normal breath sounds.   Abdominal:      General: Bowel sounds are normal.      Palpations: Abdomen is soft.   Musculoskeletal:         General: Normal range of motion.      Cervical back: Neck supple.   Skin:     General: Skin is warm.   Neurological:      Mental Status: She is alert and oriented to person, place, and time.      Comments: Upper and lower strength 5/5   Psychiatric:         Mood and Affect: Mood normal.         Behavior: Behavior normal.         Last Recorded Vitals  Blood pressure 152/70, pulse 60, temperature 36.5 °C (97.7 °F), temperature source Temporal, resp. rate 16, height 1.6 m (5' 3\"), weight 77 kg (169 lb 12.1 oz), SpO2 95%.  Intake/Output last 3 Shifts:  I/O last 3 completed shifts:  In: 240 (3.1 mL/kg) [P.O.:240]  Out: 800 (10.4 mL/kg) [Urine:800 (0.3 mL/kg/hr)]  Weight: 77 kg     Medications  Scheduled medications  aspirin, 81 mg, oral, Daily  atorvastatin, 20 mg, oral, Nightly  docusate sodium, 100 mg, oral, BID  insulin lispro, 0-5 Units, subcutaneous, TID  nitrofurantoin (macrocrystal-monohydrate), 100 mg, oral, BID  pantoprazole, " 40 mg, oral, Daily before breakfast   Or  pantoprazole, 40 mg, intravenous, Daily before breakfast      Continuous medications     PRN medications  PRN medications: acetaminophen **OR** acetaminophen **OR** acetaminophen, benzocaine-menthol, dextrose, dextrose, glucagon, glucagon, guaiFENesin, hydrALAZINE **FOLLOWED BY** [START ON 9/2/2024] hydrALAZINE, labetaloL, melatonin, ondansetron **OR** ondansetron, oxygen    Labs  CBC:   Results from last 7 days   Lab Units 09/01/24  0637 08/31/24  1805   WBC AUTO x10*3/uL 5.8 9.2   RBC AUTO x10*6/uL 4.49 5.14   HEMOGLOBIN g/dL 11.5* 12.9   HEMATOCRIT % 36.3 40.9   MCV fL 81 80   MCH pg 25.6* 25.1*   MCHC g/dL 31.7* 31.5*   RDW % 15.8* 15.7*   PLATELETS AUTO x10*3/uL 221 302     CMP:    Results from last 7 days   Lab Units 09/01/24  0637 08/31/24  1805   SODIUM mmol/L 140 137   POTASSIUM mmol/L 3.7 3.4*   CHLORIDE mmol/L 105 103   CO2 mmol/L 28 23   BUN mg/dL 18 19   CREATININE mg/dL 0.90 0.96   GLUCOSE mg/dL 127* 98   PROTEIN TOTAL g/dL 5.9* 6.7   CALCIUM mg/dL 8.6 9.0   BILIRUBIN TOTAL mg/dL 0.7 0.6   ALK PHOS U/L 91 107   AST U/L 17 22   ALT U/L 21 27     BMP:    Results from last 7 days   Lab Units 09/01/24  0637 08/31/24  1805   SODIUM mmol/L 140 137   POTASSIUM mmol/L 3.7 3.4*   CHLORIDE mmol/L 105 103   CO2 mmol/L 28 23   BUN mg/dL 18 19   CREATININE mg/dL 0.90 0.96   CALCIUM mg/dL 8.6 9.0   GLUCOSE mg/dL 127* 98     Magnesium:  Results from last 7 days   Lab Units 09/01/24  0637   MAGNESIUM mg/dL 1.73     Troponin:    Results from last 7 days   Lab Units 08/31/24  1805   TROPHS ng/L 4     BNP:     Lipid Panel:  Results from last 7 days   Lab Units 08/31/24  1805   INR  1.1   PROTIME seconds 11.9      Relevant Results  Results from last 7 days   Lab Units 09/01/24  1115 09/01/24  0646 09/01/24  0637 08/31/24 2123 08/31/24  1808 08/31/24  1805   POCT GLUCOSE mg/dL 110* 131*  --  123* 114*  --    GLUCOSE mg/dL  --   --  127*  --   --  98     Lab Results   Component  Value Date    HGBA1C 6.6 (H) 06/03/2024        Assessment/Plan    Right upper extremity weakness   TIA versus CVA  CVA   BPPV  HLD/HTN  CAD status post NORBERT to the LAD and LCx 2019  Current UTI  Insomnia      -Neurology consult  -Family will call with prescription for UTI  -Continue nitrofurantoin  -CT of the head negative-  -MRI/MRA of the head  -Speech swallow eval passed  -Statin and Lipitor  -continue DAPT with ASA and Plavix  -Last seen Dr. Choudhary 8/21/2024  -Patient sees Dr. Rivera  -Continue home meds  -Diabetic diet with Accu-Cheks and SSI  -Last A1c 6.6%  -PT/OT evaluation  -TCC for discharge planning    DVTp: Lovenox subcu    PLAN: Patient lives home alone    JESSE Rodas-CNP    Plan of care was discussed extensively with patient.  Patient verbalized understanding through teach back method.  All question and concerns addressed upon examination.    Of note, this documentation is completed using the Dragon Dictation system (voice recognition software). There may be spelling and/or grammatical errors that were not corrected prior to final submission.

## 2024-09-01 NOTE — CONSULTS
Inpatient consult to Neurology  Consult performed by: Stan Rea MD  Consult ordered by: JESSE Koch-CNP          History Of Present Illness  Stefany France is a 84 y.o. female presenting with RUE clumsiness.    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 She takes Plavix at home.    Yesterday around 5pm she had sudden onset of right hand clumsiness. She was using her hand to eat and felt that it was not cooperating. It did not seem to be weak nor numb but clumsy. Additionally, she was having intermittent slurred speech yesterday. She told other physicians she had right face numbness but does not recall this today. She has been receiving treatment for a UTI. She does not have a history of focal deficits with her previous UTIs. Her speech as well as 90% of her RUE symptoms resolved after around 6 hours. Today she does report mild residual clumsiness but this is not apparent on my exam.    She did have a right frontal headache yesterday, without photophobia, phonophobia, or nausea. 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  Past Medical History  Past Medical History:   Diagnosis Date    Transient cerebral ischemic attack, unspecified     Mini stroke     Surgical History  Past Surgical History:   Procedure Laterality Date    OTHER SURGICAL HISTORY  2022    Breast biopsy    OTHER SURGICAL HISTORY  2022    Cardiac catheterization with stent placement    OTHER SURGICAL HISTORY  2022    Dermatological surgery    OTHER SURGICAL HISTORY  2022    Dilation and curettage    OTHER SURGICAL HISTORY  2022    Tonsillectomy    OTHER SURGICAL HISTORY  2022    Lithotripsy     Social History  Social History     Tobacco Use    Smoking status: Former     Current packs/day: 0.00     Types: Cigarettes     Quit date:      Years since quittin.6    Smokeless tobacco:  Never   Vaping Use    Vaping status: Never Used   Substance Use Topics    Alcohol use: Yes     Comment: 2-3x a week    Drug use: Never     Allergies  Ace inhibitors  Medications Prior to Admission   Medication Sig Dispense Refill Last Dose    atenolol (Tenormin) 25 mg tablet Take 1 tablet (25 mg) by mouth 2 times a day.   8/31/2024 at 0900    clopidogrel (Plavix) 75 mg tablet Take 1 tablet (75 mg) by mouth once daily. 90 tablet 3 8/31/2024 at 0900    FLUoxetine (PROzac) 40 mg capsule Take 1 capsule (40 mg) by mouth once daily.   8/31/2024 at 0900    hydrALAZINE (Apresoline) 25 mg tablet Take 1 tablet (25 mg) by mouth 2 times a day. 180 tablet 3 8/31/2024 at 0900    hydroCHLOROthiazide (HYDRODiuril) 25 mg tablet Take 1 tablet (25 mg) by mouth once daily. 90 tablet 3 8/31/2024 at 0900    metFORMIN  mg 24 hr tablet Take 1 tablet (500 mg) by mouth once daily.   8/31/2024 at 0900    omeprazole OTC (PriLOSEC OTC) 20 mg EC tablet 1 tablet (20 mg).   8/31/2024 at 0900    potassium chloride CR 10 mEq ER tablet Take 1 tablet (10 mEq) by mouth 2 times a day.   8/31/2024 at 2100    amLODIPine (Norvasc) 10 mg tablet Take 1 tablet (10 mg) by mouth once daily.   8/30/2024 at 2100    atorvastatin (Lipitor) 20 mg tablet Take 1 tablet (20 mg) by mouth once daily. 90 tablet 3 8/30/2024 at 2100    fluticasone (Flonase) 50 mcg/actuation nasal spray SPRAY 2 SPRAYS IN EACH NOSTRIL DAILY   Unknown    nitrofurantoin, macrocrystal-monohydrate, (Macrobid) 100 mg capsule Take 1 capsule (100 mg) by mouth 2 times a day. Started on 8/26/2024, prescribed 7 day course       traZODone (Desyrel) 50 mg tablet Take 1 tablet (50 mg) by mouth once daily at bedtime.   Unknown       Review of Systems  Neurological Exam  Mental Status  Awake, alert and oriented to person, place and time. Speech is normal. Language is fluent with no aphasia. Attention and concentration are normal.    Cranial Nerves  CN II: Visual fields full to confrontation.  CN III,  IV, VI: Extraocular movements intact bilaterally. Normal saccades. Normal smooth pursuit. Normal lids and orbits bilaterally. Pupils equal round and reactive to light bilaterally.  CN V:  Right: Facial sensation is normal.  Left: Facial sensation is normal on the left.  CN VII: Full and symmetric facial movement.  CN VIII: Hearing is normal.  CN IX, X: Palate elevates symmetrically  CN XI: Shoulder shrug strength is normal.  CN XII: Tongue midline without atrophy or fasciculations.    Motor  Normal muscle bulk throughout. No fasciculations present. Normal muscle tone. No abnormal involuntary movements.                                               Right                     Left   Shoulder abduction               5                          5  Elbow flexion                         5                          5  Elbow extension                    5                          5  Finger flexion                         5                          5  Finger extension                    5                          5  Finger abduction                    5                          5  Hip flexion                              5                          5  Knee flexion                           5                          5  Plantarflexion                         5                          5  Dorsiflexion                            5                          5    Sensory  Light touch is normal in upper and lower extremities.     Reflexes                                            Right                      Left  Biceps                                 2+                         2+  Triceps                                2+                         2+  Patellar                                2+                         2+  Achilles                                0                         0  Right Plantar: downgoing  Left Plantar: downgoing    Coordination  Right: Finger-to-nose normal. Heel-to-shin normal.Left: Finger-to-nose normal.  "Heel-to-shin normal.    Physical Exam  Eyes:      General: Lids are normal.      Extraocular Movements: EOM normal.      Pupils: Pupils are equal, round, and reactive to light.   Neurological:      Deep Tendon Reflexes:      Reflex Scores:       Tricep reflexes are 2+ on the right side and 2+ on the left side.       Bicep reflexes are 2+ on the right side and 2+ on the left side.       Patellar reflexes are 2+ on the right side and 2+ on the left side.       Achilles reflexes are 0 on the right side and 0 on the left side.  Psychiatric:         Speech: Speech normal.       Last Recorded Vitals  Blood pressure 140/61, pulse 62, temperature 36.1 °C (97 °F), temperature source Temporal, resp. rate 16, height 1.6 m (5' 3\"), weight 77 kg (169 lb 12.1 oz), SpO2 97%.    Relevant Results        NIH Stroke Scale  1A. Level of Consciousness: Alert, Keenly Responsive  1B. Ask Month and Age: Both Questions Right  1C. Blink Eyes & Squeeze Hands: Performs Both Tasks  2. Best Gaze: Normal  3. Visual: No Visual Loss  4. Facial Palsy: Normal Symmetrical Movements  5A. Motor - Left Arm: No Drift  5B. Motor - Right Arm: No Drift  6A. Motor - Left Leg: No Drift  6B. Motor - Right Leg: No Drift  7. Limb Ataxia: Absent  8. Sensory Loss: Normal  9. Best Language: No Aphasia  10. Dysarthria: Normal  11. Extinction and Inattention: No Abnormality  NIH Stroke Scale: 0           [place] Coma Scale  Best Eye Response: Spontaneous  Best Verbal Response: Oriented  Best Motor Response: Follows commands  [place] Coma Scale Score: 15                 I have personally reviewed the following imaging results ECG 12 lead    Result Date: 9/1/2024  Sinus bradycardia Nonspecific ST abnormality Abnormal ECG When compared with ECG of 06-AUG-2023 16:54, No significant change was found See ED provider note for full interpretation and clinical correlation Confirmed by Jenny Mac ([id number]) on 9/1/2024 10:38:04 AM    CT brain attack head wo IV " contrast    Result Date: 8/31/2024  Interpreted By:  Davida Thomson, STUDY: CT BRAIN ATTACK HEAD WO IV CONTRAST  8/31/2024 6:16 pm   INDICATION: Signs/Symptoms:Stroke Evaluation   COMPARISON: CT head 01/22/2020   ACCESSION NUMBER(S): [id number]   ORDERING CLINICIAN: SUKHDEV ERNST   TECHNIQUE: Serial, axial CT images of the brain were obtained without IV contrast. Coronal and sagittal reformatted images were performed.   FINDINGS: The ventricles, cisterns and sulci are prominent, consistent with diffuse volume loss.  There are areas of nonspecific white matter hypodensity, which are probably age-related or microvascular in nature. Hypodense area at the right basal ganglia may represent a prominent perivascular space versus remote lacunar infarct. The gray-white matter differentiation is intact and there is no evidence of acute territorial infarct.  No mass effect or midline shift is seen.  There is no hemorrhage.  No extraaxial fluid collection. No air-fluid levels at the visualized paranasal sinuses. The mastoid air cells are clear. No depressed calvarial fracture.       1.  No acute intracranial hemorrhage or acute territorial infarct. 2.  Diffuse parenchymal volume loss. Chronic microvascular ischemic changes.     MACRO: Davida Thomson discussed the significance and urgency of this critical finding by telephone with  SUKHDEV ERNST on 8/31/2024 at 6:34 pm.  (**-RCF-**) Findings:  See findings.   Signed by: Davida Thomson 8/31/2024 6:34 PM Dictation workstation:   [id number]    Nuclear Stress Test    Result Date: 8/13/2024  Interpreted By:  Angel Ruiz and Christo Dennis STUDY: MYOCARDIAL PERFUSION STRESS TEST WITH LEXISCAN   Performing facility: [facility], [address] [place] [address] [id number] Provider:  Juwan Naqvi DO PCP:  Dr. SAM Supervising provider:  Juwan Mena DO, [facility]   INDICATION: Atherosclerotic heart disease of native  coronary artery without angina pectoris; Coronary angioplasty status.   HISTORY: Gender:  F; Age:  85 y/o ; Height:  .5 cm cm; Weight:   WT 77.384 kg kg.   HIGH CHOLESTEROL; CAD; S/P NSTEMI; HTN; HX/CVA; BILATERAL WILBERT; LVH; GERD.   Quit smoking 26 years ago.   Cardiac catheterization on 2019-LM 30% STENOSIS, RCA 40% STENOSIS. PTCA on 06/2019-LAD,CX.   COMPARISON: Previous nuclear testing completed on08/2021 at [facility].   ACCESSION NUMBER(S): [id number]   ORDERING CLINICIAN: DOC MARTINO   TECHNIQUE: ONE DAY protocol. Stress injection: Date:08/12/2024, 35.4 mCi of Myoview IV 20 seconds after rapid injection of Lexiscan. Rest injection: Date: 08/12/2024, 10.8 mCi of Myoview IV at rest. The patient had a rapid injection of 0.4 mg of Lexiscan IV over 10 seconds. Imaging was performed by gated tomographic technique. Reason for Lexiscan: AMBULATES WITH A CANE.   STRESS TEST DATA: Resting heart rate was 54 BPM. Resting blood pressure was 160/88 mmHg. Peak blood pressure was 174/78 mmHg. Peak heart rate was 64 BPM.   TEST TERMINATED DUE TO:  Protocol completed.   FINDINGS: STRESS TEST RESULTS:   Resting electrocardiogram revealed normal sinus rhythm, non-specific STT wave changes, occasional PACs. There were no significant ischemic ECG changes or dysrhythmias. The patient did not have chest pains/symptoms during procedure. There was a normal recovery phase.   IMAGING RESULTS:   Image quality was good. Rest and stress tomographic images were reviewed and revealed normal perfusion without evidence of ischemia, myocardial infarction, or left ventricular dilatation with stress. Overall left ventricular systolic function appeared to be normal without regional wall motion abnormalities. Ejection fraction was 73%. TID is 1.0 and is normal. There was not evidence of breast/motion/diaphragmatic attenuation artifact.       Normal Lexiscan Myoview cardiac perfusion stress test. No evidence of ischemia or myocardial infarction by  perfusion imaging. Normal left ventricular systolic function, ejection fraction 73%. Noninvasive risk stratification: Low risk. Comparison study August 11, 2021 was negative for ischemia or infarction. Calculated LV ejection fraction 83%.   Signed by: Angel Ruiz 8/13/2024 11:38 AM Dictation workstation:   [id number]  .      Assessment/Plan   Assessment & Plan  TIA (transient ischemic attack)      Impression:  TIA vs stroke vs metabolic encephalopathy due to UTI ; improving  History of right corona radiata, right occipital infarct 2019, with minimal residual deficit    Recommend:  MRI brain, MRA head/neck  TTE but can be done outpatient  Continue Plavix and statin for now         Stan Rea MD

## 2024-09-02 VITALS
WEIGHT: 169.75 LBS | SYSTOLIC BLOOD PRESSURE: 168 MMHG | DIASTOLIC BLOOD PRESSURE: 66 MMHG | HEIGHT: 63 IN | TEMPERATURE: 98.1 F | HEART RATE: 51 BPM | RESPIRATION RATE: 17 BRPM | OXYGEN SATURATION: 98 % | BODY MASS INDEX: 30.08 KG/M2

## 2024-09-02 VITALS
RESPIRATION RATE: 17 BRPM | WEIGHT: 169.75 LBS | TEMPERATURE: 97.5 F | DIASTOLIC BLOOD PRESSURE: 60 MMHG | SYSTOLIC BLOOD PRESSURE: 129 MMHG | HEIGHT: 63 IN | BODY MASS INDEX: 30.08 KG/M2 | HEART RATE: 57 BPM | OXYGEN SATURATION: 95 %

## 2024-09-02 DIAGNOSIS — I63.132 CEREBROVASCULAR ACCIDENT (CVA) DUE TO EMBOLISM OF LEFT CAROTID ARTERY (MULTI): Primary | ICD-10-CM

## 2024-09-02 DIAGNOSIS — I67.89 OTHER CEREBROVASCULAR DISEASE: ICD-10-CM

## 2024-09-02 PROBLEM — I63.9 EMBOLIC STROKE (MULTI): Status: ACTIVE | Noted: 2024-08-31

## 2024-09-02 LAB
GLUCOSE BLD MANUAL STRIP-MCNC: 121 MG/DL (ref 74–99)
GLUCOSE BLD MANUAL STRIP-MCNC: 155 MG/DL (ref 74–99)
HOLD SPECIMEN: NORMAL
HOLD SPECIMEN: NORMAL
LACTATE SERPL-SCNC: 1.3 MMOL/L (ref 0.4–2)

## 2024-09-02 PROCEDURE — 99231 SBSQ HOSP IP/OBS SF/LOW 25: CPT | Performed by: STUDENT IN AN ORGANIZED HEALTH CARE EDUCATION/TRAINING PROGRAM

## 2024-09-02 PROCEDURE — 99239 HOSP IP/OBS DSCHRG MGMT >30: CPT

## 2024-09-02 PROCEDURE — 83605 ASSAY OF LACTIC ACID: CPT | Performed by: NURSE PRACTITIONER

## 2024-09-02 PROCEDURE — 2500000004 HC RX 250 GENERAL PHARMACY W/ HCPCS (ALT 636 FOR OP/ED)

## 2024-09-02 PROCEDURE — 82947 ASSAY GLUCOSE BLOOD QUANT: CPT

## 2024-09-02 PROCEDURE — 97161 PT EVAL LOW COMPLEX 20 MIN: CPT | Mod: GP

## 2024-09-02 PROCEDURE — 36415 COLL VENOUS BLD VENIPUNCTURE: CPT | Performed by: NURSE PRACTITIONER

## 2024-09-02 PROCEDURE — 2500000002 HC RX 250 W HCPCS SELF ADMINISTERED DRUGS (ALT 637 FOR MEDICARE OP, ALT 636 FOR OP/ED): Performed by: NURSE PRACTITIONER

## 2024-09-02 PROCEDURE — 97165 OT EVAL LOW COMPLEX 30 MIN: CPT | Mod: GO

## 2024-09-02 PROCEDURE — 1200000002 HC GENERAL ROOM WITH TELEMETRY DAILY

## 2024-09-02 PROCEDURE — 2500000001 HC RX 250 WO HCPCS SELF ADMINISTERED DRUGS (ALT 637 FOR MEDICARE OP)

## 2024-09-02 PROCEDURE — 2500000001 HC RX 250 WO HCPCS SELF ADMINISTERED DRUGS (ALT 637 FOR MEDICARE OP): Performed by: NURSE PRACTITIONER

## 2024-09-02 RX ORDER — FLUOXETINE HYDROCHLORIDE 20 MG/1
40 CAPSULE ORAL DAILY
Status: DISCONTINUED | OUTPATIENT
Start: 2024-09-02 | End: 2024-09-02 | Stop reason: HOSPADM

## 2024-09-02 RX ORDER — ATORVASTATIN CALCIUM 20 MG/1
40 TABLET, FILM COATED ORAL NIGHTLY
Qty: 60 TABLET | Refills: 0 | Status: SHIPPED | OUTPATIENT
Start: 2024-09-02 | End: 2024-10-02

## 2024-09-02 RX ORDER — PANTOPRAZOLE SODIUM 40 MG/1
40 TABLET, DELAYED RELEASE ORAL
Qty: 30 TABLET | Refills: 0 | Status: SHIPPED | OUTPATIENT
Start: 2024-09-03 | End: 2024-10-03

## 2024-09-02 RX ORDER — ASPIRIN 81 MG/1
81 TABLET ORAL DAILY
Qty: 30 TABLET | Refills: 0 | Status: SHIPPED | OUTPATIENT
Start: 2024-09-03 | End: 2024-10-03

## 2024-09-02 RX ORDER — AMLODIPINE BESYLATE 5 MG/1
10 TABLET ORAL DAILY
Status: DISCONTINUED | OUTPATIENT
Start: 2024-09-02 | End: 2024-09-02 | Stop reason: HOSPADM

## 2024-09-02 RX ORDER — CEFTRIAXONE 1 G/50ML
1 INJECTION, SOLUTION INTRAVENOUS EVERY 24 HOURS
Status: DISCONTINUED | OUTPATIENT
Start: 2024-09-02 | End: 2024-09-02 | Stop reason: HOSPADM

## 2024-09-02 RX ADMIN — AMLODIPINE BESYLATE 10 MG: 5 TABLET ORAL at 09:04

## 2024-09-02 RX ADMIN — FLUOXETINE HYDROCHLORIDE 40 MG: 20 CAPSULE ORAL at 13:48

## 2024-09-02 RX ADMIN — HYDROCHLOROTHIAZIDE 25 MG: 25 TABLET ORAL at 08:56

## 2024-09-02 RX ADMIN — HYDRALAZINE HYDROCHLORIDE 25 MG: 25 TABLET ORAL at 08:56

## 2024-09-02 RX ADMIN — ASPIRIN 81 MG: 81 TABLET, COATED ORAL at 09:03

## 2024-09-02 RX ADMIN — CLOPIDOGREL BISULFATE 75 MG: 75 TABLET, FILM COATED ORAL at 08:57

## 2024-09-02 RX ADMIN — CEFTRIAXONE SODIUM 1 G: 1 INJECTION, SOLUTION INTRAVENOUS at 09:05

## 2024-09-02 RX ADMIN — PANTOPRAZOLE SODIUM 40 MG: 40 TABLET, DELAYED RELEASE ORAL at 06:21

## 2024-09-02 RX ADMIN — POTASSIUM CHLORIDE 10 MEQ: 750 TABLET, EXTENDED RELEASE ORAL at 09:04

## 2024-09-02 ASSESSMENT — PAIN SCALES - GENERAL
PAINLEVEL_OUTOF10: 0 - NO PAIN

## 2024-09-02 ASSESSMENT — ACTIVITIES OF DAILY LIVING (ADL): BATHING_ASSISTANCE: INDEPENDENT

## 2024-09-02 ASSESSMENT — COGNITIVE AND FUNCTIONAL STATUS - GENERAL
MOBILITY SCORE: 22
CLIMB 3 TO 5 STEPS WITH RAILING: A LITTLE
DAILY ACTIVITIY SCORE: 24
WALKING IN HOSPITAL ROOM: A LITTLE

## 2024-09-02 ASSESSMENT — PAIN - FUNCTIONAL ASSESSMENT
PAIN_FUNCTIONAL_ASSESSMENT: 0-10

## 2024-09-02 NOTE — DISCHARGE INSTRUCTIONS
Thank you for choosing [facility]. It has been a pleasure taking part in your medical care. Please follow up with your primary care provider as instructed. If your symptoms should persist or worsen, please contact your primary care physician, or in the case of an emergency proceed to the nearest Emergency Room for further care. If you have any questions about the care you received, please call [facility] at [phone]. Thank you again! GOYO Richard

## 2024-09-02 NOTE — DISCHARGE SUMMARY
Discharge Diagnosis  TIA (transient ischemic attack)    Issues Requiring Follow-Up  none    Discharge Meds     Your medication list        START taking these medications        Instructions Last Dose Given Next Dose Due   aspirin 81 mg EC tablet  Start taking on: September 3, 2024      Take 1 tablet (81 mg) by mouth once daily.       pantoprazole 40 mg EC tablet  Commonly known as: ProtoNix  Start taking on: September 3, 2024      Take 1 tablet (40 mg) by mouth once daily in the morning. Take before meals. Do not crush, chew, or split.              CHANGE how you take these medications        Instructions Last Dose Given Next Dose Due   atorvastatin 20 mg tablet  Commonly known as: Lipitor  What changed:   how much to take  when to take this      Take 2 tablets (40 mg) by mouth once daily at bedtime.              CONTINUE taking these medications        Instructions Last Dose Given Next Dose Due   amLODIPine 10 mg tablet  Commonly known as: Norvasc           atenolol 25 mg tablet  Commonly known as: Tenormin           clopidogrel 75 mg tablet  Commonly known as: Plavix      Take 1 tablet (75 mg) by mouth once daily.       FLUoxetine 40 mg capsule  Commonly known as: PROzac           fluticasone 50 mcg/actuation nasal spray  Commonly known as: Flonase           hydrALAZINE 25 mg tablet  Commonly known as: Apresoline      Take 1 tablet (25 mg) by mouth 2 times a day.       hydroCHLOROthiazide 25 mg tablet  Commonly known as: HYDRODiuril      Take 1 tablet (25 mg) by mouth once daily.       metFORMIN  mg 24 hr tablet  Commonly known as: Glucophage-XR           nitrofurantoin (macrocrystal-monohydrate) 100 mg capsule  Commonly known as: Macrobid           potassium chloride CR 10 mEq ER tablet  Commonly known as: Klor-Con           traZODone 50 mg tablet  Commonly known as: Desyrel                  STOP taking these medications      omeprazole OTC 20 mg EC tablet  Commonly known as: PriLOSEC OTC                   Where to Get Your Medications        These medications were sent to [id number] - [place], [place] - [address]  [address] [address]      Hours: 24-hours Phone: [phone]   aspirin 81 mg EC tablet  atorvastatin 20 mg tablet  pantoprazole 40 mg EC tablet         Test Results Pending At Discharge  Pending Labs       No current pending labs.            Last Vitals  Vitals:    09/01/24 2002 09/02/24 0004 09/02/24 0405 09/02/24 0725   BP: 171/70 168/66 162/72 120/55   BP Location: Left arm Left arm Left arm Left arm   Patient Position: Lying Lying Lying Lying   Pulse: 61 51 55 52   Resp: 18 17 16 17   Temp: 37 °C (98.6 °F) 36.7 °C (98.1 °F) 37 °C (98.6 °F) 36.3 °C (97.3 °F)   TempSrc: Temporal Temporal Temporal Temporal   SpO2: 97% 98% 97% 93%   Weight:       Height:           Hospital Course  Stefany France is a 84 y.o. female with a significant past medical history of CVA, BPPV, HLD, HTN, hearing loss presenting to [place] [facility] with family for c/o right hand weakness and right facial numbness that began around 4p this afternoon. Glucose 114 on arrival, She denies any double vision but says she was having difficulty reaching for the hair brush, grabbing it, while moving the hand with coordination to complete task. Also states she was having Right facial numbness that has since resolved; NIHSS 0, mild tongue deviation to the right otherwise CN II-XII all intact.  Patient treated for UTI.  Patient was seen by neurology continue DAPT and follow-up with Jessica.  Patient had MRI MRA which showed small acute to subacute infarct involving the left motor cortex, extensive white matter changes.  Multifocal areas of atherosclerotic versus artifactual narrowing involving bilateral internal carotid arteries.  No evidence for significant stenosis of the cervical vessels.  Patient showed improvement and is ready for discharge.  Patient will be discharged home  with healthy at home program.  Daughter at bedside will be updated.  I did discharge patient hemodynamically stable.    Pertinent Physical Exam At Time of Discharge  Physical Exam  Constitutional:       Appearance: Normal appearance.   HENT:      Head: Normocephalic.      Mouth/Throat:      Mouth: Mucous membranes are moist.   Eyes:      Pupils: Pupils are equal, round, and reactive to light.   Cardiovascular:      Rate and Rhythm: Normal rate and regular rhythm.      Heart sounds: Normal heart sounds, S1 normal and S2 normal.   Pulmonary:      Effort: Pulmonary effort is normal.      Breath sounds: Normal breath sounds.   Abdominal:      General: Bowel sounds are normal.      Palpations: Abdomen is soft.   Musculoskeletal:         General: Normal range of motion.      Cervical back: Neck supple.   Skin:     General: Skin is warm.   Neurological:      Mental Status: She is alert and oriented to person, place, and time.      Comments: Upper and lower strength 5/5   Psychiatric:         Mood and Affect: Mood normal.         Behavior: Behavior normal.        Outpatient Follow-Up  Future Appointments   Date Time Provider Department [place]   6/23/2025 11:45 AM Jimmy Rivera MD [id number] [place]   8/6/2025  2:15 PM Juwan Naqvi DO [id number] [place]         Hilary Starks, APRN-CNP

## 2024-09-02 NOTE — PROGRESS NOTES
09/02/24 1244   Discharge Planning   Living Arrangements Alone   Support Systems Family members;Children   Assistance Needed outpatient rehab   Type of Residence Private residence   Number of Stairs to Enter Residence 2   Number of Stairs Within Residence 0   Do you have animals or pets at home? No   Who is requesting discharge planning? Provider   Home or Post Acute Services None   Expected Discharge Disposition Home   Does the patient need discharge transport arranged? No   Patient Choice   Provider Choice list and CMS website (https://medicare.gov/care-compare#search) for post-acute Quality and Resource Measure Data were provided and reviewed with: Patient;Family   Patient / Family choosing to utilize agency / facility established prior to hospitalization No     Patient admitted with right hand weakness, found to have another sight stroke per neurology. Patient is able to ambulate, however, PT/OT recommend out patient rehab. Prescription provided to  patient by [facility], will discharge home with no further needs.

## 2024-09-02 NOTE — PROGRESS NOTES
"Stefany France is a 84 y.o. female on day 0 of admission presenting with TIA (transient ischemic attack).    Subjective   MRI showed small left hand knob stroke  Symptoms resolved       Objective     Last Recorded Vitals  Blood pressure 129/60, pulse 57, temperature 36.4 °C (97.5 °F), resp. rate 17, height 1.6 m (5' 3\"), weight 77 kg (169 lb 12.1 oz), SpO2 95%.    Physical Exam  Neurological Exam  EOM full range. Face, tongue are symmetric.  No tremor noted with rest, posture, or action. Normal finger to nose. 5/5 strength throughout.   Relevant Results    NIH Stroke Scale  1A. Level of Consciousness: Alert, Keenly Responsive  1B. Ask Month and Age: Both Questions Right  1C. Blink Eyes & Squeeze Hands: Performs Both Tasks  2. Best Gaze: Normal  3. Visual: No Visual Loss  4. Facial Palsy: Normal Symmetrical Movements  5A. Motor - Left Arm: No Drift  5B. Motor - Right Arm: No Drift  6A. Motor - Left Leg: No Drift  6B. Motor - Right Leg: No Drift  7. Limb Ataxia: Absent  8. Sensory Loss: Normal  9. Best Language: No Aphasia  10. Dysarthria: Normal  11. Extinction and Inattention: No Abnormality  NIH Stroke Scale: 0           [place] Coma Scale  Best Eye Response: Spontaneous  Best Verbal Response: Oriented  Best Motor Response: Follows commands  [place] Coma Scale Score: 15                                  Assessment/Plan      Assessment & Plan  Embolic stroke (Multi)    Impression:  Small LMCA embolic stroke - etiology might be moderate intracranial carotid stenosis    Recommend:  Aspirin, plavix x 90 days then Plavix monotherapy  Raise atorvastatin to 40 mg  TTE, Holter outpatient  OK to discharge, followup with Jessica Rea MD  "

## 2024-09-02 NOTE — PROGRESS NOTES
Occupational Therapy    Evaluation    Patient Name: Stefany France  MRN: [id number]  Today's Date: 9/2/2024  Time Calculation  Start Time: 1121  Stop Time: 1147  Time Calculation (min): 26 min        Assessment:  Prognosis: Good  Evaluation/Treatment Tolerance: Patient tolerated treatment well  End of Session Communication: Bedside nurse  End of Session Patient Position: Bed, 2 rail up, Alarm off, not on at start of session (Daughter present in room)  Prognosis: Good  Evaluation/Treatment Tolerance: Patient tolerated treatment well  Plan:  No Skilled OT: No acute OT goals identified  OT Frequency: OT eval only  OT Discharge Recommendations: No further acute OT  OT - OK to Discharge: Yes (when medically stable/cleared)     Subjective   Current Problem:  1. TIA (transient ischemic attack)  Transthoracic Echo (TTE) Complete    Transthoracic Echo (TTE) Complete      2. At risk for arrhythmia  Holter Or Event Cardiac Monitor    Holter Or Event Cardiac Monitor      3. Other cerebrovascular vasospasm and vasoconstriction  Holter Or Event Cardiac Monitor    Holter Or Event Cardiac Monitor    atorvastatin (Lipitor) 20 mg tablet    aspirin 81 mg EC tablet    pantoprazole (ProtoNix) 40 mg EC tablet    Referral to Healthy at Home Program    Referral to Physical Therapy      4. Cerebrovascular accident (CVA) due to embolism of precerebral artery (Multi)  Discharge patient        General:  General  Reason for Referral: ADL impairment  Referred By: Rut OT/PT 8/31  Past Medical History Relevant to Rehab: CVA, HTN, HLD, BPPV  Family/Caregiver Present: Yes  Caregiver Feedback: daughter present  Co-Treatment: PT  Co-Treatment Reason: to maximize pt. safety  Prior to Session Communication: Bedside nurse  Patient Position Received: Bed, 3 rail up, Alarm off, not on at start of session  General Comment: Pt. is 85 y/o female to ED 8/31 with sudden onset of right hand weakness.  The symptoms occurred about 20 minutes prior to arrival.  She  was in the bathroom attempting to comb her hair when her hand would not hold onto the brush. MRI brain: white matter changes, small acute to subacute infarct. Neuro consulted.  Precautions:  Medical Precautions: Fall precautions    Pain:  Pain Assessment  Pain Assessment: 0-10  0-10 (Numeric) Pain Score: 0 - No pain    Objective   Cognition:  Overall Cognitive Status: Within Functional Limits  Orientation Level: Oriented X4  Insight: Mild  Impulsive: Mildly (Pt. moves very quickly)    Home Living:  Home Living Comments: Pt. lives alone in two story house, 3 JESSIE with HR. Pt. bed/bath on 2nd floor. 1/2 bath 3 steps down from main floor. Laundry in the basement. Has tub shower, grab bars.  Prior Function:  Prior Function Comments: Pt. is independent with ADLs/IADLs, however family assist with taking out the trash and mowing lawn. Pt. uses cane for longer distances and outside, No AD inside. Does not own walker. Denies falls. Drives.    ADL:  Eating Assistance: Independent  Grooming Assistance: Independent  Bathing Assistance: Independent  UE Dressing Assistance: Independent  LE Dressing Assistance:  (Independent; socks; EOB)  Toileting Assistance with Device: Independent  Activity Tolerance:  Endurance: Endurance does not limit participation in activity  Bed Mobility/Transfers: Bed Mobility  Bed Mobility: Yes  Bed Mobility 1  Bed Mobility 1: Supine to sitting, Sitting to supine  Bed Mobility Comments 1: Mod I  with and without cane trial and WW trial    Transfers  Transfer: Yes  Transfer 1  Technique 1: Sit to stand, Stand to sit  Transfer Device 1:  (without AD and cane/WW)  Transfer Level of Assistance 1: Modified independent  Trials/Comments 1: multiple sit to stands from edge of bed    Functional Mobility:  Functional Mobility  Functional Mobility Performed:  (Multiple trials without AD, cane and WW use. Pt. demonstrates walking very quickly with cane and without cane. Able to demonstrate more control with WW use.  No LOB. Educated on safety and fall risk when walking quickly)  Sitting Balance:  Static Sitting Balance  Static Sitting-Level of Assistance: Independent  Standing Balance:  Dynamic Standing Balance  Dynamic Standing-Comments: Fair +     Sensation:  Sensation Comment: Denies sensation changes/loss in BUEs and LEs  Strength:  Strength Comments: WNL 5/5 RUE, 4/5 LUE    Coordination:  Coordination Comment: WNL   Hand Function:  Gross Grasp: Functional  Extremities: RUE   RUE : Within Functional Limits and LUE   LUE: Within Functional Limits    Outcome Measures:[facility] Daily Activity  Putting on and taking off regular lower body clothing: None  Bathing (including washing, rinsing, drying): None  Putting on and taking off regular upper body clothing: None  Toileting, which includes using toilet, bedpan or urinal: None  Taking care of personal grooming such as brushing teeth: None  Eating Meals: None  Daily Activity - Total Score: 24    Education Documentation  ADL Training, taught by Moira Moseley OT at 9/2/2024  1:44 PM.  Learner: Patient  Readiness: Acceptance  Method: Explanation  Response: Verbalizes Understanding, Demonstrated Understanding    IP EDUCATION:  Education  Individual(s) Educated: Patient  Education Provided: Fall precautons, Risk and benefits of OT discussed with patient or other, POC discussed and agreed upon (safety with ADLs/mobility with and without AD)  Patient Response to Education: Patient/Caregiver Verbalized Understanding of Information  Education Comment: Pt. educated on safety and decreased fall risk when slowing down when walking

## 2024-09-02 NOTE — PROGRESS NOTES
Physical Therapy    Physical Therapy Evaluation    Patient Name: Stefany France  MRN: [id number]  Today's Date: 9/2/2024   Time Calculation  Start Time: 1122  Stop Time: 1147  Time Calculation (min): 25 min  1002/1002-A    Assessment/Plan   PT Assessment  PT Assessment Results: Decreased strength, Impaired balance  Rehab Prognosis: Good  Barriers to Discharge: impaired balance  End of Session Communication: Bedside nurse  Assessment Comment: Pt with good tolerance. Recommend follow up wtih OP PT for balance and safety wtih ambulation .  End of Session Patient Position: Bed, 2 rail up, Alarm off, not on at start of session  IP OR SWING BED PT PLAN  Inpatient or Swing Bed: Inpatient  PT Plan  Treatment/Interventions: Bed mobility, Transfer training, Gait training  PT Plan: Ongoing PT  PT Frequency: 3 times per week  PT Discharge Recommendations: Low intensity level of continued care  PT Recommended Transfer Status: Stand by assist      Subjective     Current Problem:  Embolic stroke    General Visit Information:  General  Reason for Referral: impaired mobility  Referred By: Rut OT/PT 8/31  Past Medical History Relevant to Rehab: CVA, HTN, HLD, BPPV  Family/Caregiver Present: Yes  Caregiver Feedback: daughter present  Co-Treatment: PT  Co-Treatment Reason: to maximize pt. safety  Prior to Session Communication: Bedside nurse  Patient Position Received: Bed, 3 rail up, Alarm off, not on at start of session  General Comment: Pt. is 83 y/o female to ED 8/31 with sudden onset of right hand weakness.  The symptoms occurred about 20 minutes prior to arrival.  She was in the bathroom attempting to comb her hair when her hand would not hold onto the brush. MRI brain: white matter changes, small acute to subacute infarct. Neuro consulted.    Home Living:  Home Living  Home Living Comments: Pt. lives alone in two story house, 3 JESSIE with HR. Pt. bed/bath on 2nd floor. 1/2 bath 3 steps down from main floor. Laundry in the basement.  Has tub shower, grab bars.    Prior Level of Function:  Prior Function Per Pt/Caregiver Report  Prior Function Comments: Pt. is independent with ADLs/IADLs, however family assist with taking out the trash and mowing lawn. Pt. uses cane for longer distances and outside, No AD inside. Does not own walker. Denies falls. Drives.    Precautions:  Precautions  Medical Precautions: Fall precautions    Vital Signs:     Objective     Pain:  Pain Assessment  Pain Assessment: 0-10  0-10 (Numeric) Pain Score: 0 - No pain    Cognition:  Cognition  Overall Cognitive Status: Within Functional Limits  Orientation Level: Oriented X4  Insight: Mild  Impulsive: Mildly    General Assessments:  General Observation  General Observation: Pt agreeble to PT/OT evaluation. Pt hesitant for PT recommendations.   Activity Tolerance  Endurance: Endurance does not limit participation in activity  Sensation  Sensation Comment: Denies sensation changes/loss in BUEs and LEs  Strength  Strength Comments: B/L UE WFL, L hip flex 4/5, knee ext and DF 4+/5, R LE 4+/5 MMT     Coordination  Coordination Comment: WFl tested with toe tpa and toe to target     Static Sitting Balance  Static Sitting-Comment/Number of Minutes: good  Dynamic Sitting Balance  Dynamic Sitting-Comments: good  Static Standing Balance  Static Standing-Comment/Number of Minutes: good  Dynamic Standing Balance  Dynamic Standing-Comments: Fair +    Functional Assessments:     Bed Mobility  Bed Mobility: Yes  Bed Mobility 1  Bed Mobility Comments 1: Modified indpeendent with bed mobility  Transfers  Transfer: Yes  Transfer 1  Trials/Comments 1: Multiple sit to stands during session, no AD with modified independence  Ambulation/Gait Training  Ambulation/Gait Training Performed: Yes  Ambulation/Gait Training 1  Comments/Distance (ft) 1: Mutliple attempts with ambulation. Performed ambulation 60 feet with no AD with decreased arm swing and decreased stance time with SBA. Pt reattempted wtih  SC with decreased safety. Pt reattempted with WW with Supervision 60 feet with decreased speed, increased safety and increased foot contact. Pt refusing to use WW and admits she will NOT use at  home. Reattemtped without AD and increased cues to mitch down. Pt with improved speed, focus on foot contact. LImited by impaired balance.          Extremity/Trunk Assessments:  RUE   RUE : Within Functional Limits (ROM)  LUE   LUE: Within Functional Limits (ROM)  RLE   RLE : Within Functional Limits (ROM)  LLE   LLE : Within Functional Limits (ROM)    Outcome Measures:     [facility] Basic Mobility  Turning from your back to your side while in a flat bed without using bedrails: None  Moving from lying on your back to sitting on the side of a flat bed without using bedrails: None  Moving to and from bed to chair (including a wheelchair): None  Standing up from a chair using your arms (e.g. wheelchair or bedside chair): None  To walk in hospital room: A little  Climbing 3-5 steps with railing: A little  Basic Mobility - Total Score: 22             Goals:  Pt will demonstrate independence with transfers with no assistive device.   Pt will ambulate >100 feet with no Assistive device and no LOB   Pt will demonstrate reaches outside of base of support with no LOB.   Pt will up/down 12 steps with one HR with modified independence to access home    Education Documentation  Educated on safety with assistive device. Pt refusing, recommend slow gait and OP PT for balance

## 2024-09-02 NOTE — HOSPITAL COURSE
Stefany France is a 84 y.o. female with a significant past medical history of CVA, BPPV, HLD, HTN, hearing loss presenting to [place] [facility] with family for c/o right hand weakness and right facial numbness that began around 4p this afternoon. Glucose 114 on arrival, She denies any double vision but says she was having difficulty reaching for the hair brush, grabbing it, while moving the hand with coordination to complete task. Also states she was having Right facial numbness that has since resolved; NIHSS 0, mild tongue deviation to the right otherwise CN II-XII all intact.  Patient treated for UTI.  Patient was seen by neurology continue DAPT and follow-up with Jessica.  Patient had MRI MRA which showed small acute to subacute infarct involving the left motor cortex, extensive white matter changes.  Multifocal areas of atherosclerotic versus artifactual narrowing involving bilateral internal carotid arteries.  No evidence for significant stenosis of the cervical vessels.  Patient showed improvement and is ready for discharge.  Patient will be discharged home with healthy at home program.  Daughter at bedside will be updated.  I did discharge patient hemodynamically stable.

## 2024-09-02 NOTE — CARE PLAN
The patient's goals for the shift include Labs WNL    The clinical goals for the shift include Labs WNL    Problem: Safety - Adult  Goal: Free from fall injury  Outcome: Progressing     Problem: Discharge Planning  Goal: Discharge to home or other facility with appropriate resources  Outcome: Progressing     Problem: General Stroke  Goal: Establish a mutual long term goal with patient by discharge  Outcome: Progressing  Goal: Demonstrate improvement in neurological exam throughout the shift  Outcome: Progressing  Goal: Maintain BP within ordered limits throughout shift  Outcome: Progressing  Goal: Participate in treatment (ie., meds, therapy) throughout shift  Outcome: Progressing  Goal: No symptoms of aspiration throughout shift  Outcome: Progressing  Goal: No symptoms of hemorrhage throughout shift  Outcome: Progressing  Goal: Tolerate enteral feeding throughout shift  Outcome: Progressing  Goal: Decreased nausea/vomiting throughout shift  Outcome: Progressing  Goal: Controlled blood glucose throughout shift  Outcome: Progressing  Goal: Out of bed three times today  Outcome: Progressing     Problem: Skin  Goal: Participates in plan/prevention/treatment measures  9/2/2024 0640 by Yojana Lebron RN  Outcome: Progressing  9/1/2024 2013 by Yojana Lebron RN  Flowsheets (Taken 9/1/2024 2013)  Participates in plan/prevention/treatment measures:   Increase activity/out of bed for meals   Discuss with provider PT/OT consult   Elevate heels  Goal: Prevent/manage excess moisture  9/2/2024 0640 by Yojana Lebron RN  Outcome: Progressing  9/1/2024 2013 by Yojana Lebron RN  Flowsheets (Taken 9/1/2024 2013)  Prevent/manage excess moisture:   Use wicking fabric (obtain order)   Moisturize dry skin   Cleanse incontinence/protect with barrier cream   Monitor for/manage infection if present   Follow provider orders for dressing changes  Goal: Prevent/minimize sheer/friction injuries  9/2/2024 0640 by Yojana  EVA Lebron  Outcome: Progressing  9/1/2024 2013 by Yojana Lebron RN  Flowsheets (Taken 9/1/2024 2013)  Prevent/minimize sheer/friction injuries:   Use pull sheet   Utilize specialty bed per algorithm   Turn/reposition every 2 hours/use positioning/transfer devices   Increase activity/out of bed for meals   HOB 30 degrees or less   Complete micro-shifts as needed if patient unable. Adjust patient position to relieve pressure points, not a full turn  Goal: Promote/optimize nutrition  9/2/2024 0640 by Yojana Lebron RN  Outcome: Progressing  9/1/2024 2013 by Yojana Lebron RN  Flowsheets (Taken 9/1/2024 2013)  Promote/optimize nutrition:   Offer water/supplements/favorite foods   Discuss with provider if NPO > 2 days   Assist with feeding   Reassess MST if dietician not consulted   Monitor/record intake including meals   Consume > 50% meals/supplements  Goal: Promote skin healing  9/2/2024 0640 by Yojana Lebron RN  Outcome: Progressing  9/1/2024 2013 by Yojana Lebron RN  Flowsheets (Taken 9/1/2024 2013)  Promote skin healing:   Turn/reposition every 2 hours/use positioning/transfer devices   Rotate device position/do not position patient on device   Protective dressings over bony prominences   Ensure correct size (line/device) and apply per  instructions   Assess skin/pad under line(s)/device(s)     Problem: Fall/Injury  Goal: Not fall by end of shift  Outcome: Progressing  Goal: Be free from injury by end of the shift  Outcome: Progressing  Goal: Verbalize understanding of personal risk factors for fall in the hospital  Outcome: Progressing  Goal: Verbalize understanding of risk factor reduction measures to prevent injury from fall in the home  Outcome: Progressing  Goal: Use assistive devices by end of the shift  Outcome: Progressing  Goal: Pace activities to prevent fatigue by end of the shift  Outcome: Progressing

## 2024-09-03 DIAGNOSIS — I10 BENIGN ESSENTIAL HYPERTENSION: Primary | ICD-10-CM

## 2024-09-03 NOTE — TELEPHONE ENCOUNTER
Pt called stating she was in the hospital Sat 8/31/24 to Mon 9/2/24 for a TIA. Pt states she is on Atenolol 25mg. In the hospital her heart rate dropped into the 50s or below. Pt asking if she should still take the Atenolol as prescribed.   Routed to Renetta TOMLIN

## 2024-09-05 RX ORDER — ATENOLOL 25 MG/1
25 TABLET ORAL DAILY
Start: 2024-09-05 | End: 2025-09-05

## 2024-09-05 NOTE — TELEPHONE ENCOUNTER
I called patient and advised her to take daily per Dr. Naqvi. She verbalized understanding and stated that she was taking twice daily.

## 2024-09-12 ENCOUNTER — APPOINTMENT (OUTPATIENT)
Dept: PHYSICAL THERAPY | Facility: CLINIC | Age: 84
End: 2024-09-12
Payer: MEDICARE

## 2024-09-16 ENCOUNTER — APPOINTMENT (OUTPATIENT)
Dept: PHYSICAL THERAPY | Facility: CLINIC | Age: 84
End: 2024-09-16
Payer: MEDICARE

## 2024-09-18 ENCOUNTER — APPOINTMENT (OUTPATIENT)
Dept: CARDIOLOGY | Facility: CLINIC | Age: 84
End: 2024-09-18
Payer: MEDICARE

## 2024-09-18 VITALS
WEIGHT: 168.2 LBS | BODY MASS INDEX: 29.8 KG/M2 | DIASTOLIC BLOOD PRESSURE: 58 MMHG | SYSTOLIC BLOOD PRESSURE: 110 MMHG | HEART RATE: 64 BPM

## 2024-09-18 DIAGNOSIS — I63.89 AC ISCH MULTI VASC TERRITORIES STROKE (MULTI): ICD-10-CM

## 2024-09-18 DIAGNOSIS — R42 LIGHTHEADED: ICD-10-CM

## 2024-09-18 DIAGNOSIS — I25.10 CAD S/P PERCUTANEOUS CORONARY ANGIOPLASTY: ICD-10-CM

## 2024-09-18 DIAGNOSIS — K21.9 GASTROESOPHAGEAL REFLUX DISEASE, UNSPECIFIED WHETHER ESOPHAGITIS PRESENT: ICD-10-CM

## 2024-09-18 DIAGNOSIS — H93.13 TINNITUS, BILATERAL: ICD-10-CM

## 2024-09-18 DIAGNOSIS — G45.9 MINI STROKE: ICD-10-CM

## 2024-09-18 DIAGNOSIS — I51.7 LVH (LEFT VENTRICULAR HYPERTROPHY): ICD-10-CM

## 2024-09-18 DIAGNOSIS — Z87.891 FORMER SMOKER: ICD-10-CM

## 2024-09-18 DIAGNOSIS — Z98.61 CAD S/P PERCUTANEOUS CORONARY ANGIOPLASTY: ICD-10-CM

## 2024-09-18 DIAGNOSIS — I10 BENIGN ESSENTIAL HYPERTENSION: ICD-10-CM

## 2024-09-18 DIAGNOSIS — R42 DIZZINESS: ICD-10-CM

## 2024-09-18 DIAGNOSIS — I63.9 CEREBROVASCULAR ACCIDENT (CVA), UNSPECIFIED MECHANISM (MULTI): Primary | ICD-10-CM

## 2024-09-18 DIAGNOSIS — E78.2 MIXED HYPERLIPIDEMIA: ICD-10-CM

## 2024-09-18 PROCEDURE — 99214 OFFICE O/P EST MOD 30 MIN: CPT | Performed by: INTERNAL MEDICINE

## 2024-09-18 PROCEDURE — 1159F MED LIST DOCD IN RCRD: CPT | Performed by: INTERNAL MEDICINE

## 2024-09-18 PROCEDURE — 1036F TOBACCO NON-USER: CPT | Performed by: INTERNAL MEDICINE

## 2024-09-18 PROCEDURE — 3078F DIAST BP <80 MM HG: CPT | Performed by: INTERNAL MEDICINE

## 2024-09-18 PROCEDURE — 3074F SYST BP LT 130 MM HG: CPT | Performed by: INTERNAL MEDICINE

## 2024-09-18 PROCEDURE — 1111F DSCHRG MED/CURRENT MED MERGE: CPT | Performed by: INTERNAL MEDICINE

## 2024-09-18 RX ORDER — LANOLIN ALCOHOL/MO/W.PET/CERES
1000 CREAM (GRAM) TOPICAL
COMMUNITY

## 2024-09-18 NOTE — H&P (VIEW-ONLY)
CARDIOLOGY OFFICE VISIT      CHIEF COMPLAINT  Chief Complaint   Patient presents with    Hospital Follow-up     From [facility] for TIA discharged 2024        HISTORY OF PRESENT ILLNESS  Patient is a 84-year-old  female with past medical history significant for coronary artery disease, status post non-ST elevation myocardial infarction, status post drug-eluting stents LAD and left circumflex 2019, cerebrovascular accident, hypertension, left ventricular hypertrophy, hyperlipidemia who presents for follow-up cardiovascular care.     Patient has chronic unsteady balance and has not yet followed with physical therapy that was ordered for her by another physician.  She will use a pedal bike every other day for 10 to 15 minutes.  She denies chest pain, dyspnea, palpitations, nausea, vomiting.  Occasional dizziness getting up without near syncope or esperanza syncope.  She drinks 2 cups of coffee per day.  Drinks approximately 50 ounces of water per day.  Patient has occasional edema lower extremities after prolonged drive.  Patient went to the hospital recently for dizziness, slurred speech, and right hand weakness.  The slurred speech and right hand weakness resolved in less than 1 hour.  She was diagnosed with a small acute to subacute infarct involving the left motor cortex.  Patient notes that her atorvastatin was increased and her atenolol was decreased at discharge from the hospital.        Past medical history:  As above plus  Gastroesophageal reflux disease  Nephrolithiasis  Optic migraines  Vertigo     Past surgical history:  Tonsillectomy  Right breast lumpectomy  D&C     Social history:  Quit smoking age 58, smoked up to half a pack of cigarettes per day for approximately 30 years.  62 glasses of wine daily     Family history:  Mother  in her 60s with emphysema  Father  60s cardiac arrest perioperative     Review systems are negative, noncontributory, orders previously mentioned  x12 systems.     I personally reviewed EKG June 23, 2019: Sinus bradycardia, nonspecific ST abnormality     Assessment:  Coronary artery disease, status post non-ST elevation myocardial, NORBERT LAD and left circumflex June 24, 2019  Hypertension  Left ventricular hypertrophy  Hyperlipidemia  Hyperglycemia  Cerebrovascular accident June 24, 2019, cerebrovascular accident September 1, 2024- Dr Rivera  Gastroesophageal reflux disease  Vertigo  Chronic unsteady balance  Tinnitis  Intolerance losartan due to dizziness  ACE inhibitor intolerance cough/hoarse voice  Intolerance to fenofibrate due to constipation     Recommendations:  Patient appears to be stable from a cardiac standpoint. No symptoms of angina. No ischemia on stress test.  No symptomatic arrhythmia. No evidence of heart failure.  To further evaluate etiology of CVA will proceed with transesophageal echocardiogram, and 28-day event monitor.  Patient reports that she has a follow-up with neurology nurse practitioner in October.  Follow-up with myself after testing.     Please excuse any errors in grammar or translation related to this dictation. Voice recognition software was utilized to prepare this document.     Recent Cardiovascular Testing:  The following results have been reviewed and updated.     Lab  10/23/23  , HDL 60, LDL 53, Trig 157     Echo 6/24/19  1. EF 60-65%  2. 1+ AVR  3. Mild LVH  4. Moderate left atrial enlargement     24 hour HOlter 8/1/19  Sinus rhythm average 70 bpm  no cardiac arrhythmias       CRD: 7/28/20  1. Mild carotid artery stenosis.     MPL:8/12/24  1. Normal  2. EF 73%.    CTA head/neck 9/1/24  Mild bilateral ICA  Possible focal narrowing within a upper branch of the right PCA and  within the left anterior ZOILA.          VITALS  Vitals:    09/18/24 1448   BP: 110/58   Pulse: 64     Wt Readings from Last 4 Encounters:   09/18/24 76.3 kg (168 lb 3.2 oz)   08/31/24 77 kg (169 lb 12.1 oz)   08/21/24 78.8 kg (173 lb 11.2 oz)    24 77.4 kg (170 lb 9.6 oz)       PHYSICAL EXAM:  GENERAL:  Well developed, well nourished, in no acute distress.  CHEST:  Symmetric and nontender.  NEURO/PSYCH:  Alert and oriented times three with approppriate behavior and responses.  NECK:  Supple, no JVD, no bruit.  LUNGS:  Clear to auscultation bilaterally, normal respiratory effort.  HEART:  Rate and rhythm regular with no evident murmur, no gallop appreciated.                   There are no rubs, clicks or heaves.  EXTREMITIES:  Warm with good color, no clubbing or cyanosis.  There is no edema noted.  PERIPHERAL VASCULAR:  Pulses present and equally palpable; 2+ throughout.    ASSESSMENT AND PLAN  Diagnoses and all orders for this visit:  Cerebrovascular accident (CVA), unspecified mechanism (Multi)  -     Transesophageal Echo (MOSES); Future  -     Holter Or Event Cardiac Monitor; Future  -     Holter Or Event Cardiac Monitor; Future  -     CBC; Future  -     Basic metabolic panel; Future  CAD S/P percutaneous coronary angioplasty  Benign essential hypertension  LVH (left ventricular hypertrophy)  Mixed hyperlipidemia  Gastroesophageal reflux disease, unspecified whether esophagitis present  Lightheaded  Tinnitus, bilateral  BMI 29.0-29.9,adult  Former smoker  Ac isch multi vasc territories stroke (Multi)  -     Transesophageal Echo (MOSES); Future  Mini stroke  -     Holter Or Event Cardiac Monitor; Future  Dizziness  -     Holter Or Event Cardiac Monitor; Future      Past Medical History  Past Medical History:   Diagnosis Date    TIA (transient ischemic attack) 2024    Transient cerebral ischemic attack, unspecified     Mini stroke       Social History  Social History     Tobacco Use    Smoking status: Former     Current packs/day: 0.00     Types: Cigarettes     Quit date:      Years since quittin.7    Smokeless tobacco: Never   Vaping Use    Vaping status: Never Used   Substance Use Topics    Alcohol use: Yes     Comment: occasional    Drug  use: Never       Family History   No family history on file.     Allergies:  Allergies   Allergen Reactions    Ace Inhibitors Cough        Outpatient Medications:  Current Outpatient Medications   Medication Instructions    amLODIPine (NORVASC) 10 mg, oral, Daily    aspirin 81 mg, oral, Daily    atenolol (TENORMIN) 25 mg, oral, Daily    atorvastatin (LIPITOR) 40 mg, oral, Nightly    clopidogrel (PLAVIX) 75 mg, oral, Daily    cyanocobalamin (VITAMIN B-12) 1,000 mcg, oral, As needed    FLUoxetine (PROZAC) 40 mg, oral, Daily    fluticasone (Flonase) 50 mcg/actuation nasal spray SPRAY 2 SPRAYS IN EACH NOSTRIL DAILY    hydrALAZINE (APRESOLINE) 25 mg, oral, 2 times daily    hydroCHLOROthiazide (HYDRODIURIL) 25 mg, oral, Daily    nitrofurantoin, macrocrystal-monohydrate, (Macrobid) 100 mg capsule 1 capsule, oral, 2 times daily, Started on 8/26/2024, prescribed 7 day course    pantoprazole (PROTONIX) 40 mg, oral, Daily before breakfast, Do not crush, chew, or split.    potassium chloride CR 10 mEq ER tablet 10 mEq, oral, 2 times daily    traZODone (DESYREL) 50 mg, oral, Nightly        Recent Lab Results:    CMP:    Lab Results   Component Value Date     09/01/2024    K 3.7 09/01/2024     09/01/2024    CO2 28 09/01/2024    BUN 18 09/01/2024    CREATININE 0.90 09/01/2024    GLUCOSE 127 (H) 09/01/2024    CALCIUM 8.6 09/01/2024       Magnesium:    Lab Results   Component Value Date    MG 1.73 09/01/2024       Lipid Profile:    Lab Results   Component Value Date    TRIG 157 (H) 10/23/2023    HDL 60.4 10/23/2023    LDLCALC 53 10/23/2023    LDLDIRECT 73 07/17/2023       TSH:    Lab Results   Component Value Date    TSH 1.85 07/17/2023       BNP:   Lab Results   Component Value Date     (H) 08/06/2023        PT/INR:    Lab Results   Component Value Date    PROTIME 11.9 08/31/2024    INR 1.1 08/31/2024       HgBA1c:    Lab Results   Component Value Date    HGBA1C 6.6 (H) 06/03/2024       BMP:  Lab Results    Component Value Date     09/01/2024     08/31/2024     10/23/2023    K 3.7 09/01/2024    K 3.4 (L) 08/31/2024    K 3.9 10/23/2023     09/01/2024     08/31/2024     10/23/2023    CO2 28 09/01/2024    CO2 23 08/31/2024    CO2 29 10/23/2023    BUN 18 09/01/2024    BUN 19 08/31/2024    BUN 21 10/23/2023    CREATININE 0.90 09/01/2024    CREATININE 0.96 08/31/2024    CREATININE 1.04 10/23/2023       CBC:  Lab Results   Component Value Date    WBC 5.8 09/01/2024    WBC 9.2 08/31/2024    WBC 6.0 08/06/2023    WBC 8.5 07/17/2023    WBC 6.7 03/18/2023    RBC 4.49 09/01/2024    RBC 5.14 08/31/2024    RBC 5.34 (H) 08/06/2023    RBC 5.02 07/17/2023    RBC 5.09 03/18/2023    HGB 11.5 (L) 09/01/2024    HGB 12.9 08/31/2024    HGB 13.1 08/06/2023    HGB 12.5 07/17/2023    HGB 12.5 03/18/2023    HCT 36.3 09/01/2024    HCT 40.9 08/31/2024    HCT 41.8 08/06/2023    HCT 40.1 07/17/2023    HCT 40.9 03/18/2023    MCV 81 09/01/2024    MCV 80 08/31/2024    MCV 78 (L) 08/06/2023    MCV 80 07/17/2023    MCV 80 03/18/2023    MCH 25.6 (L) 09/01/2024    MCH 25.1 (L) 08/31/2024    MCHC 31.7 (L) 09/01/2024    MCHC 31.5 (L) 08/31/2024    MCHC 31.3 (L) 08/06/2023    MCHC 31.2 (L) 07/17/2023    MCHC 30.6 (L) 03/18/2023    RDW 15.8 (H) 09/01/2024    RDW 15.7 (H) 08/31/2024    RDW 15.2 (H) 08/06/2023    RDW 15.1 (H) 07/17/2023    RDW 15.1 (H) 03/18/2023     09/01/2024     08/31/2024     08/06/2023     07/17/2023     03/18/2023       Cardiac Enzymes:    Lab Results   Component Value Date    TROPHS 4 08/31/2024    TROPHS CANCELED 08/06/2023    TROPHS 4 08/06/2023       Hepatic Function Panel:    Lab Results   Component Value Date    ALKPHOS 91 09/01/2024    ALT 21 09/01/2024    AST 17 09/01/2024    PROT 5.9 (L) 09/01/2024    BILITOT 0.7 09/01/2024    BILIDIR 0.1 05/09/2022           Juwan Naqvi DO

## 2024-09-18 NOTE — PROGRESS NOTES
CARDIOLOGY OFFICE VISIT      CHIEF COMPLAINT  Chief Complaint   Patient presents with    Hospital Follow-up     From [facility] for TIA discharged 2024        HISTORY OF PRESENT ILLNESS  Patient is a 84-year-old  female with past medical history significant for coronary artery disease, status post non-ST elevation myocardial infarction, status post drug-eluting stents LAD and left circumflex 2019, cerebrovascular accident, hypertension, left ventricular hypertrophy, hyperlipidemia who presents for follow-up cardiovascular care.     Patient has chronic unsteady balance and has not yet followed with physical therapy that was ordered for her by another physician.  She will use a pedal bike every other day for 10 to 15 minutes.  She denies chest pain, dyspnea, palpitations, nausea, vomiting.  Occasional dizziness getting up without near syncope or esperanza syncope.  She drinks 2 cups of coffee per day.  Drinks approximately 50 ounces of water per day.  Patient has occasional edema lower extremities after prolonged drive.  Patient went to the hospital recently for dizziness, slurred speech, and right hand weakness.  The slurred speech and right hand weakness resolved in less than 1 hour.  She was diagnosed with a small acute to subacute infarct involving the left motor cortex.  Patient notes that her atorvastatin was increased and her atenolol was decreased at discharge from the hospital.        Past medical history:  As above plus  Gastroesophageal reflux disease  Nephrolithiasis  Optic migraines  Vertigo     Past surgical history:  Tonsillectomy  Right breast lumpectomy  D&C     Social history:  Quit smoking age 58, smoked up to half a pack of cigarettes per day for approximately 30 years.  62 glasses of wine daily     Family history:  Mother  in her 60s with emphysema  Father  60s cardiac arrest perioperative     Review systems are negative, noncontributory, orders previously mentioned  x12 systems.     I personally reviewed EKG June 23, 2019: Sinus bradycardia, nonspecific ST abnormality     Assessment:  Coronary artery disease, status post non-ST elevation myocardial, NORBERT LAD and left circumflex June 24, 2019  Hypertension  Left ventricular hypertrophy  Hyperlipidemia  Hyperglycemia  Cerebrovascular accident June 24, 2019, cerebrovascular accident September 1, 2024- Dr Rivera  Gastroesophageal reflux disease  Vertigo  Chronic unsteady balance  Tinnitis  Intolerance losartan due to dizziness  ACE inhibitor intolerance cough/hoarse voice  Intolerance to fenofibrate due to constipation     Recommendations:  Patient appears to be stable from a cardiac standpoint. No symptoms of angina. No ischemia on stress test.  No symptomatic arrhythmia. No evidence of heart failure.  To further evaluate etiology of CVA will proceed with transesophageal echocardiogram, and 28-day event monitor.  Patient reports that she has a follow-up with neurology nurse practitioner in October.  Follow-up with myself after testing.     Please excuse any errors in grammar or translation related to this dictation. Voice recognition software was utilized to prepare this document.     Recent Cardiovascular Testing:  The following results have been reviewed and updated.     Lab  10/23/23  , HDL 60, LDL 53, Trig 157     Echo 6/24/19  1. EF 60-65%  2. 1+ AVR  3. Mild LVH  4. Moderate left atrial enlargement     24 hour HOlter 8/1/19  Sinus rhythm average 70 bpm  no cardiac arrhythmias       CRD: 7/28/20  1. Mild carotid artery stenosis.     MPL:8/12/24  1. Normal  2. EF 73%.    CTA head/neck 9/1/24  Mild bilateral ICA  Possible focal narrowing within a upper branch of the right PCA and  within the left anterior ZOILA.          VITALS  Vitals:    09/18/24 1448   BP: 110/58   Pulse: 64     Wt Readings from Last 4 Encounters:   09/18/24 76.3 kg (168 lb 3.2 oz)   08/31/24 77 kg (169 lb 12.1 oz)   08/21/24 78.8 kg (173 lb 11.2 oz)    24 77.4 kg (170 lb 9.6 oz)       PHYSICAL EXAM:  GENERAL:  Well developed, well nourished, in no acute distress.  CHEST:  Symmetric and nontender.  NEURO/PSYCH:  Alert and oriented times three with approppriate behavior and responses.  NECK:  Supple, no JVD, no bruit.  LUNGS:  Clear to auscultation bilaterally, normal respiratory effort.  HEART:  Rate and rhythm regular with no evident murmur, no gallop appreciated.                   There are no rubs, clicks or heaves.  EXTREMITIES:  Warm with good color, no clubbing or cyanosis.  There is no edema noted.  PERIPHERAL VASCULAR:  Pulses present and equally palpable; 2+ throughout.    ASSESSMENT AND PLAN  Diagnoses and all orders for this visit:  Cerebrovascular accident (CVA), unspecified mechanism (Multi)  -     Transesophageal Echo (MOSES); Future  -     Holter Or Event Cardiac Monitor; Future  -     Holter Or Event Cardiac Monitor; Future  -     CBC; Future  -     Basic metabolic panel; Future  CAD S/P percutaneous coronary angioplasty  Benign essential hypertension  LVH (left ventricular hypertrophy)  Mixed hyperlipidemia  Gastroesophageal reflux disease, unspecified whether esophagitis present  Lightheaded  Tinnitus, bilateral  BMI 29.0-29.9,adult  Former smoker  Ac isch multi vasc territories stroke (Multi)  -     Transesophageal Echo (MOSES); Future  Mini stroke  -     Holter Or Event Cardiac Monitor; Future  Dizziness  -     Holter Or Event Cardiac Monitor; Future      Past Medical History  Past Medical History:   Diagnosis Date    TIA (transient ischemic attack) 2024    Transient cerebral ischemic attack, unspecified     Mini stroke       Social History  Social History     Tobacco Use    Smoking status: Former     Current packs/day: 0.00     Types: Cigarettes     Quit date:      Years since quittin.7    Smokeless tobacco: Never   Vaping Use    Vaping status: Never Used   Substance Use Topics    Alcohol use: Yes     Comment: occasional    Drug  use: Never       Family History   No family history on file.     Allergies:  Allergies   Allergen Reactions    Ace Inhibitors Cough        Outpatient Medications:  Current Outpatient Medications   Medication Instructions    amLODIPine (NORVASC) 10 mg, oral, Daily    aspirin 81 mg, oral, Daily    atenolol (TENORMIN) 25 mg, oral, Daily    atorvastatin (LIPITOR) 40 mg, oral, Nightly    clopidogrel (PLAVIX) 75 mg, oral, Daily    cyanocobalamin (VITAMIN B-12) 1,000 mcg, oral, As needed    FLUoxetine (PROZAC) 40 mg, oral, Daily    fluticasone (Flonase) 50 mcg/actuation nasal spray SPRAY 2 SPRAYS IN EACH NOSTRIL DAILY    hydrALAZINE (APRESOLINE) 25 mg, oral, 2 times daily    hydroCHLOROthiazide (HYDRODIURIL) 25 mg, oral, Daily    nitrofurantoin, macrocrystal-monohydrate, (Macrobid) 100 mg capsule 1 capsule, oral, 2 times daily, Started on 8/26/2024, prescribed 7 day course    pantoprazole (PROTONIX) 40 mg, oral, Daily before breakfast, Do not crush, chew, or split.    potassium chloride CR 10 mEq ER tablet 10 mEq, oral, 2 times daily    traZODone (DESYREL) 50 mg, oral, Nightly        Recent Lab Results:    CMP:    Lab Results   Component Value Date     09/01/2024    K 3.7 09/01/2024     09/01/2024    CO2 28 09/01/2024    BUN 18 09/01/2024    CREATININE 0.90 09/01/2024    GLUCOSE 127 (H) 09/01/2024    CALCIUM 8.6 09/01/2024       Magnesium:    Lab Results   Component Value Date    MG 1.73 09/01/2024       Lipid Profile:    Lab Results   Component Value Date    TRIG 157 (H) 10/23/2023    HDL 60.4 10/23/2023    LDLCALC 53 10/23/2023    LDLDIRECT 73 07/17/2023       TSH:    Lab Results   Component Value Date    TSH 1.85 07/17/2023       BNP:   Lab Results   Component Value Date     (H) 08/06/2023        PT/INR:    Lab Results   Component Value Date    PROTIME 11.9 08/31/2024    INR 1.1 08/31/2024       HgBA1c:    Lab Results   Component Value Date    HGBA1C 6.6 (H) 06/03/2024       BMP:  Lab Results    Component Value Date     09/01/2024     08/31/2024     10/23/2023    K 3.7 09/01/2024    K 3.4 (L) 08/31/2024    K 3.9 10/23/2023     09/01/2024     08/31/2024     10/23/2023    CO2 28 09/01/2024    CO2 23 08/31/2024    CO2 29 10/23/2023    BUN 18 09/01/2024    BUN 19 08/31/2024    BUN 21 10/23/2023    CREATININE 0.90 09/01/2024    CREATININE 0.96 08/31/2024    CREATININE 1.04 10/23/2023       CBC:  Lab Results   Component Value Date    WBC 5.8 09/01/2024    WBC 9.2 08/31/2024    WBC 6.0 08/06/2023    WBC 8.5 07/17/2023    WBC 6.7 03/18/2023    RBC 4.49 09/01/2024    RBC 5.14 08/31/2024    RBC 5.34 (H) 08/06/2023    RBC 5.02 07/17/2023    RBC 5.09 03/18/2023    HGB 11.5 (L) 09/01/2024    HGB 12.9 08/31/2024    HGB 13.1 08/06/2023    HGB 12.5 07/17/2023    HGB 12.5 03/18/2023    HCT 36.3 09/01/2024    HCT 40.9 08/31/2024    HCT 41.8 08/06/2023    HCT 40.1 07/17/2023    HCT 40.9 03/18/2023    MCV 81 09/01/2024    MCV 80 08/31/2024    MCV 78 (L) 08/06/2023    MCV 80 07/17/2023    MCV 80 03/18/2023    MCH 25.6 (L) 09/01/2024    MCH 25.1 (L) 08/31/2024    MCHC 31.7 (L) 09/01/2024    MCHC 31.5 (L) 08/31/2024    MCHC 31.3 (L) 08/06/2023    MCHC 31.2 (L) 07/17/2023    MCHC 30.6 (L) 03/18/2023    RDW 15.8 (H) 09/01/2024    RDW 15.7 (H) 08/31/2024    RDW 15.2 (H) 08/06/2023    RDW 15.1 (H) 07/17/2023    RDW 15.1 (H) 03/18/2023     09/01/2024     08/31/2024     08/06/2023     07/17/2023     03/18/2023       Cardiac Enzymes:    Lab Results   Component Value Date    TROPHS 4 08/31/2024    TROPHS CANCELED 08/06/2023    TROPHS 4 08/06/2023       Hepatic Function Panel:    Lab Results   Component Value Date    ALKPHOS 91 09/01/2024    ALT 21 09/01/2024    AST 17 09/01/2024    PROT 5.9 (L) 09/01/2024    BILITOT 0.7 09/01/2024    BILIDIR 0.1 05/09/2022           Juwan Naqvi DO

## 2024-09-18 NOTE — PATIENT INSTRUCTIONS
Continue same medications and treatments.   Patient educated on proper medication use.   Patient educated on risk factor modification.   Please bring any lab results from other providers / physicians to your next appointment.     Please bring all medicines, vitamins, and herbal supplements with you when you come to the office.     Prescriptions will not be filled unless you are compliant with your follow up appointments or have a follow up appointment scheduled as per instruction of your physician. Refills should be requested at the time of your visit.    MOSES ORDERED WITH Dr. Omega Luna MD  CANCEL ECHO ON 9/19/24    30 DAY ZIO PATCH ORDERED    FOLLOW UP AFTER TESTING    ISoraya LPN, am scribing for and in the presence of Dr. Juwan Naqvi, DO, FACC

## 2024-09-19 ENCOUNTER — APPOINTMENT (OUTPATIENT)
Dept: CARDIOLOGY | Facility: HOSPITAL | Age: 84
End: 2024-09-19
Payer: MEDICARE

## 2024-09-19 ENCOUNTER — TELEPHONE (OUTPATIENT)
Dept: CARDIOLOGY | Facility: CLINIC | Age: 84
End: 2024-09-19
Payer: MEDICARE

## 2024-09-19 NOTE — TELEPHONE ENCOUNTER
14 day Ziopatch times 2 15862/24920 does not require prior auth per Vero GUNN at [facility] Medicare-call ref#-[id number]-9/19/24-3:49pm.

## 2024-09-20 ENCOUNTER — ANCILLARY PROCEDURE (OUTPATIENT)
Dept: CARDIOLOGY | Facility: CLINIC | Age: 84
End: 2024-09-20
Payer: MEDICARE

## 2024-09-20 DIAGNOSIS — I63.9 CEREBROVASCULAR ACCIDENT (CVA), UNSPECIFIED MECHANISM (MULTI): ICD-10-CM

## 2024-09-20 DIAGNOSIS — R42 DIZZINESS: ICD-10-CM

## 2024-09-20 PROCEDURE — 93248 EXT ECG>7D<15D REV&INTERPJ: CPT | Performed by: INTERNAL MEDICINE

## 2024-09-20 PROCEDURE — 93246 EXT ECG>7D<15D RECORDING: CPT | Performed by: INTERNAL MEDICINE

## 2024-09-24 ENCOUNTER — LAB (OUTPATIENT)
Dept: LAB | Facility: LAB | Age: 84
End: 2024-09-24
Payer: MEDICARE

## 2024-09-24 DIAGNOSIS — I63.9 CEREBROVASCULAR ACCIDENT (CVA), UNSPECIFIED MECHANISM (MULTI): ICD-10-CM

## 2024-09-24 LAB
ANION GAP SERPL CALC-SCNC: 13 MMOL/L (ref 10–20)
BUN SERPL-MCNC: 15 MG/DL (ref 6–23)
CALCIUM SERPL-MCNC: 8.6 MG/DL (ref 8.6–10.3)
CHLORIDE SERPL-SCNC: 107 MMOL/L (ref 98–107)
CO2 SERPL-SCNC: 26 MMOL/L (ref 21–32)
CREAT SERPL-MCNC: 1.02 MG/DL (ref 0.5–1.05)
EGFRCR SERPLBLD CKD-EPI 2021: 55 ML/MIN/1.73M*2
ERYTHROCYTE [DISTWIDTH] IN BLOOD BY AUTOMATED COUNT: 15.3 % (ref 11.5–14.5)
GLUCOSE SERPL-MCNC: 156 MG/DL (ref 74–99)
HCT VFR BLD AUTO: 40.5 % (ref 36–46)
HGB BLD-MCNC: 12.1 G/DL (ref 12–16)
MCH RBC QN AUTO: 25.2 PG (ref 26–34)
MCHC RBC AUTO-ENTMCNC: 29.9 G/DL (ref 32–36)
MCV RBC AUTO: 84 FL (ref 80–100)
NRBC BLD-RTO: 0 /100 WBCS (ref 0–0)
PLATELET # BLD AUTO: 287 X10*3/UL (ref 150–450)
POTASSIUM SERPL-SCNC: 3.5 MMOL/L (ref 3.5–5.3)
RBC # BLD AUTO: 4.8 X10*6/UL (ref 4–5.2)
SODIUM SERPL-SCNC: 142 MMOL/L (ref 136–145)
WBC # BLD AUTO: 6.9 X10*3/UL (ref 4.4–11.3)

## 2024-09-24 PROCEDURE — 80048 BASIC METABOLIC PNL TOTAL CA: CPT

## 2024-09-24 PROCEDURE — 36415 COLL VENOUS BLD VENIPUNCTURE: CPT

## 2024-09-24 PROCEDURE — 85027 COMPLETE CBC AUTOMATED: CPT

## 2024-09-28 ENCOUNTER — LAB (OUTPATIENT)
Dept: LAB | Facility: LAB | Age: 84
End: 2024-09-28
Payer: MEDICARE

## 2024-09-28 DIAGNOSIS — E11.9 TYPE 2 DIABETES MELLITUS WITHOUT COMPLICATIONS (MULTI): Primary | ICD-10-CM

## 2024-09-28 DIAGNOSIS — I10 ESSENTIAL (PRIMARY) HYPERTENSION: ICD-10-CM

## 2024-09-28 DIAGNOSIS — K21.9 GASTRO-ESOPHAGEAL REFLUX DISEASE WITHOUT ESOPHAGITIS: ICD-10-CM

## 2024-09-28 LAB
ANION GAP SERPL CALC-SCNC: 12 MMOL/L (ref 10–20)
BUN SERPL-MCNC: 14 MG/DL (ref 6–23)
CALCIUM SERPL-MCNC: 8.4 MG/DL (ref 8.6–10.3)
CHLORIDE SERPL-SCNC: 108 MMOL/L (ref 98–107)
CO2 SERPL-SCNC: 27 MMOL/L (ref 21–32)
CREAT SERPL-MCNC: 0.96 MG/DL (ref 0.5–1.05)
EGFRCR SERPLBLD CKD-EPI 2021: 59 ML/MIN/1.73M*2
EST. AVERAGE GLUCOSE BLD GHB EST-MCNC: 131 MG/DL
GLUCOSE SERPL-MCNC: 118 MG/DL (ref 74–99)
HBA1C MFR BLD: 6.2 %
POTASSIUM SERPL-SCNC: 3.8 MMOL/L (ref 3.5–5.3)
SODIUM SERPL-SCNC: 143 MMOL/L (ref 136–145)

## 2024-09-28 PROCEDURE — 80048 BASIC METABOLIC PNL TOTAL CA: CPT

## 2024-09-28 PROCEDURE — 36415 COLL VENOUS BLD VENIPUNCTURE: CPT

## 2024-09-28 PROCEDURE — 83036 HEMOGLOBIN GLYCOSYLATED A1C: CPT

## 2024-09-29 NOTE — PROGRESS NOTES
Subjective   Stefany France is a 84 y.o.   female. [facility] 8-31-24, Dr. Rea  [facility]  PMH of CVA (2019), BPPV, HLD, HTN, migraine, hearing loss is here being seen s/p hospitalization for stroke-like symptoms of right hand weakness and right facial droop/slurred speech with resolution within 1 hour . She does have a hx of focal deficits with UTIs as well. MRI brain revealed small acute/subacute stroke in the left motor cortex. 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 DAPT x 90 days, then Plavix thereafter. Statin dose increased.   Today, she continues with DAPT and statin. No reports of bleeding or bruising with medications. She is no longer having these neurological symptoms since discharge.  She has followed with cardiology who ordered MOSES to be done tomorrow/Holter monitor in place now. She did not qualify for any therapy. Using cane to ambulate. Denies any falls at home.  I have reviewed the medications, labs, imaging results, and the chart. Review of systems are negative unless otherwise specified in HPI.     Objective   Neurological Exam  Mental Status  Awake, alert and oriented to person, place and time. Recent and remote memory are intact. Speech is normal. Language is fluent with no aphasia. Attention and concentration are normal.    Cranial Nerves  CN II: Right visual acuity: Counts fingers. Left visual acuity: Counts fingers. Right normal visual field. Left normal visual field.  CN III, IV, VI: Extraocular movements intact bilaterally. No nystagmus.   Right pupil: 3 mm. Round. Reactive to light. Reactive to accommodation.   Left pupil: 3 mm. Round. Reactive to light. Reactive to accommodation.  CN V:  Right: Facial sensation is normal.  Left: Facial sensation is normal on the left.  CN VII:  Right: There is no facial weakness.  Left: There is no facial weakness.  CN VIII:  Right: Hearing is normal.  Left: Hearing is normal.  CN IX,  X:  Right: Palate is normal.  Left: Palate is normal.  CN XI:  Right: Trapezius strength is normal.  Left: Trapezius strength is normal.  CN XII: Tongue midline without atrophy or fasciculations.    Motor  Normal muscle bulk throughout. Normal muscle tone. Strength is 5/5 throughout all four extremities.    Sensory  Light touch is normal in upper and lower extremities.     Reflexes  Deep tendon reflexes are 2+ and symmetric in all four extremities.    Coordination  Right: Finger-to-nose normal.Left: Finger-to-nose normal.    Gait  Casual gait is normal including stance, stride, and arm swing.    Physical Exam  HENT:      Right Ear: Hearing normal.      Left Ear: Hearing normal.   Eyes:      Extraocular Movements: EOM normal. No nystagmus.   Neurological:      Motor: Motor strength is normal.     Deep Tendon Reflexes: Reflexes are normal and symmetric.   Psychiatric:         Speech: Speech normal.           Assessment/Plan   #CVA with vascular risk factors of CAD, HTN, HLD  Continue with clopidogrel and aspirin until the last day of November and then just take the clopidogrel. Follow up in 6 months.    Education: Discussed bleeding precautions with patient while on anti platelet and/or anticoagulation therapy. Discussed stroke prevention such as: HgbA1c <7, tight blood pressure control <130/<80, LDL <70 with statin therapy (if indicated), CPAP/BiPAP compliance (if indicated) and lifestyle modification (Mediterranean diet, daily exercise, nicotine cessation & limiting alcohol use).   Discussed s/sx of stroke such as: face drooping, arm/leg weakness, speech difficulty; sudden numbness of face/extremity, sudden confusion, sudden trouble seeing, sudden trouble walking, sudden severe headache, dizziness, loss of balance or coordination and to call 911 immediately.?

## 2024-09-30 ENCOUNTER — APPOINTMENT (OUTPATIENT)
Dept: NEUROLOGY | Facility: CLINIC | Age: 84
End: 2024-09-30
Payer: MEDICARE

## 2024-09-30 VITALS
BODY MASS INDEX: 30.3 KG/M2 | SYSTOLIC BLOOD PRESSURE: 124 MMHG | DIASTOLIC BLOOD PRESSURE: 58 MMHG | HEART RATE: 75 BPM | HEIGHT: 63 IN | WEIGHT: 171 LBS

## 2024-09-30 DIAGNOSIS — I63.9 CEREBROVASCULAR ACCIDENT (CVA), UNSPECIFIED MECHANISM (MULTI): Primary | ICD-10-CM

## 2024-09-30 PROCEDURE — 1159F MED LIST DOCD IN RCRD: CPT

## 2024-09-30 PROCEDURE — 1160F RVW MEDS BY RX/DR IN RCRD: CPT

## 2024-09-30 PROCEDURE — 1111F DSCHRG MED/CURRENT MED MERGE: CPT

## 2024-09-30 PROCEDURE — 1036F TOBACCO NON-USER: CPT

## 2024-09-30 PROCEDURE — 99215 OFFICE O/P EST HI 40 MIN: CPT

## 2024-09-30 PROCEDURE — 3078F DIAST BP <80 MM HG: CPT

## 2024-09-30 PROCEDURE — 3074F SYST BP LT 130 MM HG: CPT

## 2024-09-30 RX ORDER — GLIPIZIDE 2.5 MG/1
2.5 TABLET, EXTENDED RELEASE ORAL
COMMUNITY
Start: 2024-09-25

## 2024-09-30 ASSESSMENT — PATIENT HEALTH QUESTIONNAIRE - PHQ9
SUM OF ALL RESPONSES TO PHQ9 QUESTIONS 1 & 2: 0
2. FEELING DOWN, DEPRESSED OR HOPELESS: NOT AT ALL
1. LITTLE INTEREST OR PLEASURE IN DOING THINGS: NOT AT ALL

## 2024-09-30 ASSESSMENT — VISUAL ACUITY: METHOD_CF: 1

## 2024-09-30 NOTE — PATIENT INSTRUCTIONS
Thank you for your visit today. Follow up in 6 months.    You were seen today for: stroke     Plan of Care: Continue with clopidogrel and aspirin until the last day of November and then just take the clopidogrel.     *If you have any questions or concerns regarding the plan of care for today, please feel free to contact the office at [phone] or message via [employer].

## 2024-10-01 ENCOUNTER — HOSPITAL ENCOUNTER (OUTPATIENT)
Dept: CARDIOLOGY | Facility: HOSPITAL | Age: 84
Discharge: HOME | End: 2024-10-01
Payer: MEDICARE

## 2024-10-01 DIAGNOSIS — I63.9 CEREBROVASCULAR ACCIDENT (CVA), UNSPECIFIED MECHANISM (MULTI): ICD-10-CM

## 2024-10-01 DIAGNOSIS — I63.89 AC ISCH MULTI VASC TERRITORIES STROKE (MULTI): ICD-10-CM

## 2024-10-01 LAB
ATRIAL RATE: 61 BPM
P AXIS: 109 DEGREES
P OFFSET: 202 MS
P ONSET: 168 MS
PR INTERVAL: 90 MS
Q ONSET: 213 MS
QRS COUNT: 11 BEATS
QRS DURATION: 86 MS
QT INTERVAL: 434 MS
QTC CALCULATION(BAZETT): 436 MS
QTC FREDERICIA: 436 MS
R AXIS: 19 DEGREES
T AXIS: 69 DEGREES
T OFFSET: 430 MS
VENTRICULAR RATE: 61 BPM

## 2024-10-01 PROCEDURE — 7100000009 HC PHASE TWO TIME - INITIAL BASE CHARGE

## 2024-10-01 PROCEDURE — 7100000001 HC RECOVERY ROOM TIME - INITIAL BASE CHARGE

## 2024-10-01 PROCEDURE — 2500000004 HC RX 250 GENERAL PHARMACY W/ HCPCS (ALT 636 FOR OP/ED): Performed by: INTERNAL MEDICINE

## 2024-10-01 PROCEDURE — 93005 ELECTROCARDIOGRAM TRACING: CPT | Mod: 59

## 2024-10-01 PROCEDURE — 7100000002 HC RECOVERY ROOM TIME - EACH INCREMENTAL 1 MINUTE

## 2024-10-01 PROCEDURE — 2500000005 HC RX 250 GENERAL PHARMACY W/O HCPCS: Performed by: INTERNAL MEDICINE

## 2024-10-01 PROCEDURE — 99152 MOD SED SAME PHYS/QHP 5/>YRS: CPT | Performed by: INTERNAL MEDICINE

## 2024-10-01 PROCEDURE — 7100000010 HC PHASE TWO TIME - EACH INCREMENTAL 1 MINUTE

## 2024-10-01 PROCEDURE — 99152 MOD SED SAME PHYS/QHP 5/>YRS: CPT

## 2024-10-01 RX ORDER — MIDAZOLAM HYDROCHLORIDE 1 MG/ML
INJECTION, SOLUTION INTRAMUSCULAR; INTRAVENOUS AS NEEDED
Status: DISCONTINUED | OUTPATIENT
Start: 2024-10-01 | End: 2024-10-01 | Stop reason: HOSPADM

## 2024-10-01 RX ORDER — FENTANYL CITRATE 50 UG/ML
INJECTION, SOLUTION INTRAMUSCULAR; INTRAVENOUS AS NEEDED
Status: DISCONTINUED | OUTPATIENT
Start: 2024-10-01 | End: 2024-10-01 | Stop reason: HOSPADM

## 2024-10-01 RX ORDER — SODIUM CHLORIDE 9 MG/ML
20 INJECTION, SOLUTION INTRAVENOUS CONTINUOUS
Status: DISCONTINUED | OUTPATIENT
Start: 2024-10-01 | End: 2024-10-02 | Stop reason: HOSPADM

## 2024-10-01 RX ORDER — LIDOCAINE HYDROCHLORIDE 20 MG/ML
JELLY TOPICAL AS NEEDED
Status: DISCONTINUED | OUTPATIENT
Start: 2024-10-01 | End: 2024-10-01 | Stop reason: HOSPADM

## 2024-10-01 ASSESSMENT — PAIN SCALES - GENERAL
PAINLEVEL_OUTOF10: 0 - NO PAIN

## 2024-10-01 ASSESSMENT — COLUMBIA-SUICIDE SEVERITY RATING SCALE - C-SSRS
1. IN THE PAST MONTH, HAVE YOU WISHED YOU WERE DEAD OR WISHED YOU COULD GO TO SLEEP AND NOT WAKE UP?: NO
6. HAVE YOU EVER DONE ANYTHING, STARTED TO DO ANYTHING, OR PREPARED TO DO ANYTHING TO END YOUR LIFE?: NO
2. HAVE YOU ACTUALLY HAD ANY THOUGHTS OF KILLING YOURSELF?: NO

## 2024-10-01 ASSESSMENT — PAIN - FUNCTIONAL ASSESSMENT
PAIN_FUNCTIONAL_ASSESSMENT: 0-10

## 2024-10-01 NOTE — NURSING NOTE
Patient has positive gag reflex.  Drinking without issue.  Refusing food at this time. No complaints, discharge instructions discussed with patient and family.  Follow-up for holter reviewed and patient instructed to take it easy and start with soft foods at home.

## 2024-10-02 VITALS
WEIGHT: 167.99 LBS | SYSTOLIC BLOOD PRESSURE: 129 MMHG | HEART RATE: 60 BPM | OXYGEN SATURATION: 93 % | DIASTOLIC BLOOD PRESSURE: 57 MMHG | HEIGHT: 63 IN | TEMPERATURE: 97.7 F | RESPIRATION RATE: 16 BRPM | BODY MASS INDEX: 29.77 KG/M2

## 2024-10-03 LAB — EJECTION FRACTION: 63 %

## 2024-10-04 ENCOUNTER — APPOINTMENT (OUTPATIENT)
Dept: CARDIOLOGY | Facility: CLINIC | Age: 84
End: 2024-10-04
Payer: MEDICARE

## 2024-10-04 DIAGNOSIS — G45.9 MINI STROKE: ICD-10-CM

## 2024-10-04 DIAGNOSIS — I63.9 CEREBROVASCULAR ACCIDENT (CVA), UNSPECIFIED MECHANISM (MULTI): ICD-10-CM

## 2024-10-04 PROCEDURE — 93248 EXT ECG>7D<15D REV&INTERPJ: CPT | Performed by: INTERNAL MEDICINE

## 2024-10-04 PROCEDURE — 93246 EXT ECG>7D<15D RECORDING: CPT | Performed by: INTERNAL MEDICINE

## 2024-10-11 DIAGNOSIS — I67.848 OTHER CEREBROVASCULAR VASOSPASM AND VASOCONSTRICTION: ICD-10-CM

## 2024-10-11 RX ORDER — ATORVASTATIN CALCIUM 20 MG/1
40 TABLET, FILM COATED ORAL NIGHTLY
Qty: 180 TABLET | Refills: 3 | Status: SHIPPED | OUTPATIENT
Start: 2024-10-11 | End: 2025-10-11

## 2024-10-11 NOTE — TELEPHONE ENCOUNTER
Received request for prescription refill for patient.  Patient follows with Dr. Juwan Naqvi, DO     Request is for atorvastatin   Is patient currently on medication- yes    Last OV- 9/18/24  Next OV- 10/17/24    Pended for signing and sent to provider.

## 2024-10-14 NOTE — TELEPHONE ENCOUNTER
Insurance will only cover atorvastatin 40 mg 1 tablet once daily. Routed to provider for signing.

## 2024-10-16 DIAGNOSIS — I48.0 PAROXYSMAL ATRIAL FIBRILLATION (MULTI): Primary | ICD-10-CM

## 2024-10-16 NOTE — TELEPHONE ENCOUNTER
Patient returned call. I explained the findings of monitor. She verbalized understanding and has a pending OV with Dr. Juwan Naqvi, DO, FAC tomorrow as well.

## 2024-10-16 NOTE — TELEPHONE ENCOUNTER
Per Dr. Juwan Naqvi, DO, [facility], monitor showed new afib and a 6 second pause. Stop Atenolol and stop Aspirin. Start Eliquis 5 mg twice daily. Refer to EP. I called patient and left a voicemail to return our call. I did not place orders until patient aware.

## 2024-10-17 ENCOUNTER — APPOINTMENT (OUTPATIENT)
Dept: CARDIOLOGY | Facility: CLINIC | Age: 84
End: 2024-10-17
Payer: MEDICARE

## 2024-10-17 ENCOUNTER — TELEPHONE (OUTPATIENT)
Dept: CARDIOLOGY | Facility: CLINIC | Age: 84
End: 2024-10-17

## 2024-10-17 VITALS
DIASTOLIC BLOOD PRESSURE: 66 MMHG | BODY MASS INDEX: 29.71 KG/M2 | WEIGHT: 167.7 LBS | HEART RATE: 84 BPM | SYSTOLIC BLOOD PRESSURE: 130 MMHG

## 2024-10-17 DIAGNOSIS — Z87.891 FORMER SMOKER: ICD-10-CM

## 2024-10-17 DIAGNOSIS — E78.5 DYSLIPIDEMIA: ICD-10-CM

## 2024-10-17 DIAGNOSIS — I48.0 PAROXYSMAL ATRIAL FIBRILLATION (MULTI): Primary | ICD-10-CM

## 2024-10-17 DIAGNOSIS — Z98.61 CAD S/P PERCUTANEOUS CORONARY ANGIOPLASTY: ICD-10-CM

## 2024-10-17 DIAGNOSIS — I51.7 LVH (LEFT VENTRICULAR HYPERTROPHY): ICD-10-CM

## 2024-10-17 DIAGNOSIS — I10 BENIGN ESSENTIAL HYPERTENSION: ICD-10-CM

## 2024-10-17 DIAGNOSIS — K21.9 GASTROESOPHAGEAL REFLUX DISEASE, UNSPECIFIED WHETHER ESOPHAGITIS PRESENT: ICD-10-CM

## 2024-10-17 DIAGNOSIS — I63.9 CEREBROVASCULAR ACCIDENT (CVA), UNSPECIFIED MECHANISM (MULTI): ICD-10-CM

## 2024-10-17 DIAGNOSIS — I25.10 CAD S/P PERCUTANEOUS CORONARY ANGIOPLASTY: ICD-10-CM

## 2024-10-17 PROCEDURE — 1159F MED LIST DOCD IN RCRD: CPT | Performed by: INTERNAL MEDICINE

## 2024-10-17 PROCEDURE — 1036F TOBACCO NON-USER: CPT | Performed by: INTERNAL MEDICINE

## 2024-10-17 PROCEDURE — 99214 OFFICE O/P EST MOD 30 MIN: CPT | Performed by: INTERNAL MEDICINE

## 2024-10-17 PROCEDURE — 3078F DIAST BP <80 MM HG: CPT | Performed by: INTERNAL MEDICINE

## 2024-10-17 PROCEDURE — 3075F SYST BP GE 130 - 139MM HG: CPT | Performed by: INTERNAL MEDICINE

## 2024-10-17 NOTE — TELEPHONE ENCOUNTER
Patient was given 1 month of samples for Eliquis during office visit today. Per insurance Xarelto is preferred with no prior auth needed. Will task to Dr. Naqvi for further recommendation.

## 2024-10-17 NOTE — TELEPHONE ENCOUNTER
Patient called and LM stating she cannot afford the Eliquis as it will cost her $715. She would like other options. Routed to Soraya Lazcano LPN

## 2024-10-17 NOTE — PROGRESS NOTES
CARDIOLOGY OFFICE VISIT      CHIEF COMPLAINT  Chief Complaint   Patient presents with    Follow-up     Zio patch results        HISTORY OF PRESENT ILLNESS  Patient is a 84-year-old  female with past medical history significant for coronary artery disease, status post non-ST elevation myocardial infarction, status post drug-eluting stents LAD and left circumflex June 24, 2019, cerebrovascular accident, hypertension, left ventricular hypertrophy, hyperlipidemia who presents for follow-up cardiovascular care.     Office visit September 18, 2024  Patient has chronic unsteady balance and has not yet followed with physical therapy that was ordered for her by another physician.  She will use a pedal bike every other day for 10 to 15 minutes.  She denies chest pain, dyspnea, palpitations, nausea, vomiting.  Occasional dizziness getting up without near syncope or esperanza syncope.  She drinks 2 cups of coffee per day.  Drinks approximately 50 ounces of water per day.  Patient has occasional edema lower extremities after prolonged drive.  Patient went to the hospital recently for dizziness, slurred speech, and right hand weakness.  The slurred speech and right hand weakness resolved in less than 1 hour.  She was diagnosed with a small acute to subacute infarct involving the left motor cortex.  Patient notes that her atorvastatin was increased and her atenolol was decreased at discharge from the hospital.    Office visit October 17, 2024  Patient denies chest pain or dyspnea exertion.  She has infrequent palpitations.  She denies nausea or vomiting.  She has episodes of dizziness and occasionally feels near syncopal where she feels she is going to drop out.  Denies esperanza syncope or edema.        Past medical history:  As above plus  Gastroesophageal reflux disease  Nephrolithiasis  Optic migraines  Vertigo     Past surgical history:  Tonsillectomy  Right breast lumpectomy  D&C     Social history:  Quit smoking age 58,  smoked up to half a pack of cigarettes per day for approximately 30 years.  62 glasses of wine daily     Family history:  Mother  in her 60s with emphysema  Father  60s cardiac arrest perioperative     Review systems are negative, noncontributory, orders previously mentioned x12 systems.     I personally reviewed EKG 2019: Sinus bradycardia, nonspecific ST abnormality     Assessment:  Paroxysmal atrial fibrillation  Sinus node dysfunction  Coronary artery disease, status post non-ST elevation myocardial, NORBERT LAD and left circumflex 2019  Hypertension  Left ventricular hypertrophy  Hyperlipidemia  Hyperglycemia  Cerebrovascular accident 2019, cerebrovascular accident 2024- Dr Rivera  Gastroesophageal reflux disease  Vertigo  Chronic unsteady balance  Tinnitis  Intolerance losartan due to dizziness  ACE inhibitor intolerance cough/hoarse voice  Intolerance to fenofibrate due to constipation     Recommendations:  Patient's event monitor revealed episodes of atrial fibrillation/atrial flutter (5% burden).  At this time we will start Eliquis 5 mg twice daily.  Stop aspirin.  Continue Plavix for now.  In light of her 6-second pauses we will stop atenolol.  Refer to electrophysiology for possible pacemaker.  MOSES revealed normal LV function without evidence of pulmonary embolus or left atrial appendage thrombus.     Please excuse any errors in grammar or translation related to this dictation. Voice recognition software was utilized to prepare this document.     Recent Cardiovascular Testing:  The following results have been reviewed and updated.   Lab  10/23/23  , HDL 60, LDL 53, Trig 157     Echo 19  1. EF 60-65%  2. 1+ AVR  3. Mild LVH  4. Moderate left atrial enlargement     24 hour HOlter 19  Sinus rhythm average 70 bpm  no cardiac arrhythmias       CRD: 20  1. Mild carotid artery stenosis.     MPL:24  1. Normal  2. EF 73%.     CTA  head/neck 9/1/24  Mild bilateral ICA  Possible focal narrowing within a upper branch of the right PCA and  within the left anterior ZOILA.    14 Day Zio Patch 9/20/24  Paroxysmal afib/atrial flutter with ventricular rate  bpm  Sinus pauses with the longest duration 6 seconds  PST, max rate 179 bpm    MOSES 10/1/24  EF 60-65%  Mild aortic valve regurgitation  No left atrial thrombus  Bubble study is negative            VITALS  Vitals:    10/17/24 1052   BP: 130/66   Pulse: 84     Wt Readings from Last 4 Encounters:   10/17/24 76.1 kg (167 lb 11.2 oz)   10/01/24 76.2 kg (167 lb 15.9 oz)   09/30/24 77.6 kg (171 lb)   09/18/24 76.3 kg (168 lb 3.2 oz)       PHYSICAL EXAM:  GENERAL:  Well developed, well nourished, in no acute distress.  CHEST:  Symmetric and nontender.  NEURO/PSYCH:  Alert and oriented times three with approppriate behavior and responses.  NECK:  Supple, no JVD, no bruit.  LUNGS:  Clear to auscultation bilaterally, normal respiratory effort.  HEART:  Rate and rhythm REGULAR with no evident murmur, no gallop appreciated.    There are no rubs, clicks or heaves.  EXTREMITIES:  Warm with good color, no clubbing or cyanosis.  There is no edema noted.  PERIPHERAL VASCULAR:  Pulses present and equally palpable; 2+ throughout.    ASSESSMENT AND PLAN  Diagnoses and all orders for this visit:  Paroxysmal atrial fibrillation (Multi)  -     apixaban (Eliquis) 5 mg tablet; Take 1 tablet (5 mg) by mouth 2 times a day.  CAD S/P percutaneous coronary angioplasty  Benign essential hypertension  LVH (left ventricular hypertrophy)  Dyslipidemia  Cerebrovascular accident (CVA), unspecified mechanism (Multi)  Gastroesophageal reflux disease, unspecified whether esophagitis present  BMI 29.0-29.9,adult  Former smoker      Past Medical History  Past Medical History:   Diagnosis Date    TIA (transient ischemic attack) 09/2024    Transient cerebral ischemic attack, unspecified     Mini stroke       Social History  Social  History     Tobacco Use    Smoking status: Former     Current packs/day: 0.00     Types: Cigarettes     Quit date:      Years since quittin.8    Smokeless tobacco: Never   Vaping Use    Vaping status: Never Used   Substance Use Topics    Alcohol use: Yes     Comment: occasional    Drug use: Never       Family History   No family history on file.     Allergies:  Allergies   Allergen Reactions    Ace Inhibitors Cough        Outpatient Medications:  Current Outpatient Medications   Medication Instructions    amLODIPine (NORVASC) 10 mg, Daily    apixaban (ELIQUIS) 5 mg, oral, 2 times daily    atorvastatin (LIPITOR) 40 mg, oral, Nightly    atorvastatin (LIPITOR) 40 mg, oral, Daily    clopidogrel (PLAVIX) 75 mg, oral, Daily    cyanocobalamin (VITAMIN B-12) 1,000 mcg    FLUoxetine (PROZAC) 40 mg, Daily    fluticasone (Flonase) 50 mcg/actuation nasal spray SPRAY 2 SPRAYS IN EACH NOSTRIL DAILY    hydrALAZINE (APRESOLINE) 25 mg, oral, 2 times daily    hydroCHLOROthiazide (HYDRODIURIL) 25 mg, oral, Daily    pantoprazole (PROTONIX) 40 mg, oral, Daily before breakfast, Do not crush, chew, or split.    potassium chloride CR 10 mEq ER tablet 10 mEq, 2 times daily    traZODone (DESYREL) 25 mg, oral, Nightly        Recent Lab Results:    CMP:    Lab Results   Component Value Date     2024    K 3.8 2024     (H) 2024    CO2 27 2024    BUN 14 2024    CREATININE 0.96 2024    GLUCOSE 118 (H) 2024    CALCIUM 8.4 (L) 2024       Magnesium:    Lab Results   Component Value Date    MG 1.73 2024       Lipid Profile:    Lab Results   Component Value Date    TRIG 157 (H) 10/23/2023    HDL 60.4 10/23/2023    LDLCALC 53 10/23/2023    LDLDIRECT 73 2023       TSH:    Lab Results   Component Value Date    TSH 1.85 2023       BNP:   Lab Results   Component Value Date     (H) 2023        PT/INR:    Lab Results   Component Value Date    PROTIME 11.9  08/31/2024    INR 1.1 08/31/2024       HgBA1c:    Lab Results   Component Value Date    HGBA1C 6.2 (H) 09/28/2024       BMP:  Lab Results   Component Value Date     09/28/2024     09/24/2024     09/01/2024    K 3.8 09/28/2024    K 3.5 09/24/2024    K 3.7 09/01/2024     (H) 09/28/2024     09/24/2024     09/01/2024    CO2 27 09/28/2024    CO2 26 09/24/2024    CO2 28 09/01/2024    BUN 14 09/28/2024    BUN 15 09/24/2024    BUN 18 09/01/2024    CREATININE 0.96 09/28/2024    CREATININE 1.02 09/24/2024    CREATININE 0.90 09/01/2024       CBC:  Lab Results   Component Value Date    WBC 6.9 09/24/2024    WBC 5.8 09/01/2024    WBC 9.2 08/31/2024    RBC 4.80 09/24/2024    RBC 4.49 09/01/2024    RBC 5.14 08/31/2024    HGB 12.1 09/24/2024    HGB 11.5 (L) 09/01/2024    HGB 12.9 08/31/2024    HCT 40.5 09/24/2024    HCT 36.3 09/01/2024    HCT 40.9 08/31/2024    MCV 84 09/24/2024    MCV 81 09/01/2024    MCV 80 08/31/2024    MCH 25.2 (L) 09/24/2024    MCH 25.6 (L) 09/01/2024    MCH 25.1 (L) 08/31/2024    MCHC 29.9 (L) 09/24/2024    MCHC 31.7 (L) 09/01/2024    MCHC 31.5 (L) 08/31/2024    RDW 15.3 (H) 09/24/2024    RDW 15.8 (H) 09/01/2024    RDW 15.7 (H) 08/31/2024     09/24/2024     09/01/2024     08/31/2024       Cardiac Enzymes:    Lab Results   Component Value Date    TROPHS 4 08/31/2024    TROPHS CANCELED 08/06/2023    TROPHS 4 08/06/2023       Hepatic Function Panel:    Lab Results   Component Value Date    ALKPHOS 91 09/01/2024    ALT 21 09/01/2024    AST 17 09/01/2024    PROT 5.9 (L) 09/01/2024    BILITOT 0.7 09/01/2024    BILIDIR 0.1 05/09/2022           Juwan Naqvi, DO

## 2024-10-18 NOTE — TELEPHONE ENCOUNTER
Per Dr. Juwan Naqvi DO, [facility], okay to change to Xarelto 20 mg daily. I called patient and left a voicemail to return our call. Patient can finish the 1 month of Eliquis samples then change to the Xarelto.

## 2024-10-24 ENCOUNTER — HOSPITAL ENCOUNTER (INPATIENT)
Facility: HOSPITAL | Age: 84
LOS: 1 days | Discharge: HOME | End: 2024-10-26
Attending: STUDENT IN AN ORGANIZED HEALTH CARE EDUCATION/TRAINING PROGRAM | Admitting: HOSPITALIST
Payer: MEDICARE

## 2024-10-24 ENCOUNTER — DOCUMENTATION (OUTPATIENT)
Dept: CARDIOLOGY | Facility: CLINIC | Age: 84
End: 2024-10-24
Payer: MEDICARE

## 2024-10-24 ENCOUNTER — APPOINTMENT (OUTPATIENT)
Dept: CARDIOLOGY | Facility: HOSPITAL | Age: 84
DRG: 244 | End: 2024-10-24
Payer: MEDICARE

## 2024-10-24 ENCOUNTER — APPOINTMENT (OUTPATIENT)
Dept: RADIOLOGY | Facility: HOSPITAL | Age: 84
DRG: 244 | End: 2024-10-24
Payer: MEDICARE

## 2024-10-24 DIAGNOSIS — I49.5 SINUS NODE DYSFUNCTION (MULTI): ICD-10-CM

## 2024-10-24 DIAGNOSIS — Z95.0 PACEMAKER: ICD-10-CM

## 2024-10-24 DIAGNOSIS — R00.1 BRADYCARDIA: Primary | ICD-10-CM

## 2024-10-24 DIAGNOSIS — I48.0 PAROXYSMAL ATRIAL FIBRILLATION (MULTI): ICD-10-CM

## 2024-10-24 PROBLEM — R42 DIZZINESS: Status: ACTIVE | Noted: 2024-10-24

## 2024-10-24 LAB
ALBUMIN SERPL BCP-MCNC: 3.9 G/DL (ref 3.4–5)
ALP SERPL-CCNC: 104 U/L (ref 33–136)
ALT SERPL W P-5'-P-CCNC: 30 U/L (ref 7–45)
ANION GAP SERPL CALC-SCNC: 12 MMOL/L (ref 10–20)
APTT PPP: 33 SECONDS (ref 27–38)
AST SERPL W P-5'-P-CCNC: 25 U/L (ref 9–39)
BASOPHILS # BLD AUTO: 0.05 X10*3/UL (ref 0–0.1)
BASOPHILS NFR BLD AUTO: 0.6 %
BILIRUB SERPL-MCNC: 0.5 MG/DL (ref 0–1.2)
BNP SERPL-MCNC: 81 PG/ML (ref 0–99)
BUN SERPL-MCNC: 17 MG/DL (ref 6–23)
CALCIUM SERPL-MCNC: 8.7 MG/DL (ref 8.6–10.3)
CARDIAC TROPONIN I PNL SERPL HS: 3 NG/L (ref 0–13)
CARDIAC TROPONIN I PNL SERPL HS: <3 NG/L (ref 0–13)
CHLORIDE SERPL-SCNC: 104 MMOL/L (ref 98–107)
CO2 SERPL-SCNC: 27 MMOL/L (ref 21–32)
CREAT SERPL-MCNC: 1.01 MG/DL (ref 0.5–1.05)
EGFRCR SERPLBLD CKD-EPI 2021: 55 ML/MIN/1.73M*2
EOSINOPHIL # BLD AUTO: 0 X10*3/UL (ref 0–0.4)
EOSINOPHIL NFR BLD AUTO: 0 %
ERYTHROCYTE [DISTWIDTH] IN BLOOD BY AUTOMATED COUNT: 14.9 % (ref 11.5–14.5)
GLUCOSE SERPL-MCNC: 140 MG/DL (ref 74–99)
HCT VFR BLD AUTO: 38.9 % (ref 36–46)
HGB BLD-MCNC: 12.2 G/DL (ref 12–16)
IMM GRANULOCYTES # BLD AUTO: 0.02 X10*3/UL (ref 0–0.5)
IMM GRANULOCYTES NFR BLD AUTO: 0.2 % (ref 0–0.9)
INR PPP: 1.3 (ref 0.9–1.1)
LYMPHOCYTES # BLD AUTO: 0.99 X10*3/UL (ref 0.8–3)
LYMPHOCYTES NFR BLD AUTO: 11.8 %
MAGNESIUM SERPL-MCNC: 1.41 MG/DL (ref 1.6–2.4)
MCH RBC QN AUTO: 25.4 PG (ref 26–34)
MCHC RBC AUTO-ENTMCNC: 31.4 G/DL (ref 32–36)
MCV RBC AUTO: 81 FL (ref 80–100)
MONOCYTES # BLD AUTO: 0.66 X10*3/UL (ref 0.05–0.8)
MONOCYTES NFR BLD AUTO: 7.9 %
NEUTROPHILS # BLD AUTO: 6.64 X10*3/UL (ref 1.6–5.5)
NEUTROPHILS NFR BLD AUTO: 79.5 %
NRBC BLD-RTO: 0 /100 WBCS (ref 0–0)
PLATELET # BLD AUTO: 277 X10*3/UL (ref 150–450)
POTASSIUM SERPL-SCNC: 3.8 MMOL/L (ref 3.5–5.3)
PROT SERPL-MCNC: 6.4 G/DL (ref 6.4–8.2)
PROTHROMBIN TIME: 14.8 SECONDS (ref 9.8–12.8)
RBC # BLD AUTO: 4.8 X10*6/UL (ref 4–5.2)
SODIUM SERPL-SCNC: 139 MMOL/L (ref 136–145)
WBC # BLD AUTO: 8.4 X10*3/UL (ref 4.4–11.3)

## 2024-10-24 PROCEDURE — 83735 ASSAY OF MAGNESIUM: CPT | Performed by: STUDENT IN AN ORGANIZED HEALTH CARE EDUCATION/TRAINING PROGRAM

## 2024-10-24 PROCEDURE — 85025 COMPLETE CBC W/AUTO DIFF WBC: CPT | Performed by: STUDENT IN AN ORGANIZED HEALTH CARE EDUCATION/TRAINING PROGRAM

## 2024-10-24 PROCEDURE — 36415 COLL VENOUS BLD VENIPUNCTURE: CPT | Performed by: STUDENT IN AN ORGANIZED HEALTH CARE EDUCATION/TRAINING PROGRAM

## 2024-10-24 PROCEDURE — 99285 EMERGENCY DEPT VISIT HI MDM: CPT

## 2024-10-24 PROCEDURE — 85730 THROMBOPLASTIN TIME PARTIAL: CPT | Performed by: STUDENT IN AN ORGANIZED HEALTH CARE EDUCATION/TRAINING PROGRAM

## 2024-10-24 PROCEDURE — 2500000001 HC RX 250 WO HCPCS SELF ADMINISTERED DRUGS (ALT 637 FOR MEDICARE OP): Performed by: NURSE PRACTITIONER

## 2024-10-24 PROCEDURE — 84484 ASSAY OF TROPONIN QUANT: CPT | Performed by: STUDENT IN AN ORGANIZED HEALTH CARE EDUCATION/TRAINING PROGRAM

## 2024-10-24 PROCEDURE — G0378 HOSPITAL OBSERVATION PER HR: HCPCS

## 2024-10-24 PROCEDURE — 93005 ELECTROCARDIOGRAM TRACING: CPT

## 2024-10-24 PROCEDURE — 85610 PROTHROMBIN TIME: CPT | Performed by: STUDENT IN AN ORGANIZED HEALTH CARE EDUCATION/TRAINING PROGRAM

## 2024-10-24 PROCEDURE — 71045 X-RAY EXAM CHEST 1 VIEW: CPT

## 2024-10-24 PROCEDURE — 80053 COMPREHEN METABOLIC PANEL: CPT | Performed by: STUDENT IN AN ORGANIZED HEALTH CARE EDUCATION/TRAINING PROGRAM

## 2024-10-24 PROCEDURE — 99222 1ST HOSP IP/OBS MODERATE 55: CPT | Performed by: NURSE PRACTITIONER

## 2024-10-24 PROCEDURE — 2500000004 HC RX 250 GENERAL PHARMACY W/ HCPCS (ALT 636 FOR OP/ED): Performed by: STUDENT IN AN ORGANIZED HEALTH CARE EDUCATION/TRAINING PROGRAM

## 2024-10-24 PROCEDURE — 83880 ASSAY OF NATRIURETIC PEPTIDE: CPT | Performed by: STUDENT IN AN ORGANIZED HEALTH CARE EDUCATION/TRAINING PROGRAM

## 2024-10-24 RX ORDER — POLYETHYLENE GLYCOL 3350 17 G/17G
17 POWDER, FOR SOLUTION ORAL DAILY PRN
Status: DISCONTINUED | OUTPATIENT
Start: 2024-10-24 | End: 2024-10-26 | Stop reason: HOSPADM

## 2024-10-24 RX ORDER — FLUOXETINE HYDROCHLORIDE 20 MG/1
40 CAPSULE ORAL DAILY
Status: DISCONTINUED | OUTPATIENT
Start: 2024-10-25 | End: 2024-10-26 | Stop reason: HOSPADM

## 2024-10-24 RX ORDER — TRAZODONE HYDROCHLORIDE 50 MG/1
25 TABLET ORAL NIGHTLY
Status: DISCONTINUED | OUTPATIENT
Start: 2024-10-24 | End: 2024-10-26 | Stop reason: HOSPADM

## 2024-10-24 RX ORDER — ACETAMINOPHEN 500 MG
10 TABLET ORAL NIGHTLY PRN
Status: DISCONTINUED | OUTPATIENT
Start: 2024-10-24 | End: 2024-10-26 | Stop reason: HOSPADM

## 2024-10-24 RX ORDER — ACETAMINOPHEN 160 MG/5ML
650 SOLUTION ORAL EVERY 4 HOURS PRN
Status: DISCONTINUED | OUTPATIENT
Start: 2024-10-24 | End: 2024-10-26 | Stop reason: HOSPADM

## 2024-10-24 RX ORDER — ACETAMINOPHEN 650 MG/1
650 SUPPOSITORY RECTAL EVERY 4 HOURS PRN
Status: DISCONTINUED | OUTPATIENT
Start: 2024-10-24 | End: 2024-10-26 | Stop reason: HOSPADM

## 2024-10-24 RX ORDER — HYDRALAZINE HYDROCHLORIDE 20 MG/ML
5 INJECTION INTRAMUSCULAR; INTRAVENOUS EVERY 6 HOURS PRN
Status: DISCONTINUED | OUTPATIENT
Start: 2024-10-24 | End: 2024-10-26 | Stop reason: HOSPADM

## 2024-10-24 RX ORDER — LANOLIN ALCOHOL/MO/W.PET/CERES
400 CREAM (GRAM) TOPICAL DAILY
Status: DISCONTINUED | OUTPATIENT
Start: 2024-10-24 | End: 2024-10-26 | Stop reason: HOSPADM

## 2024-10-24 RX ORDER — ACETAMINOPHEN 325 MG/1
650 TABLET ORAL EVERY 4 HOURS PRN
Status: DISCONTINUED | OUTPATIENT
Start: 2024-10-24 | End: 2024-10-26 | Stop reason: HOSPADM

## 2024-10-24 SDOH — SOCIAL STABILITY: SOCIAL INSECURITY: WITHIN THE LAST YEAR, HAVE YOU BEEN HUMILIATED OR EMOTIONALLY ABUSED IN OTHER WAYS BY YOUR PARTNER OR EX-PARTNER?: NO

## 2024-10-24 SDOH — SOCIAL STABILITY: SOCIAL INSECURITY: WITHIN THE LAST YEAR, HAVE YOU BEEN AFRAID OF YOUR PARTNER OR EX-PARTNER?: NO

## 2024-10-24 SDOH — SOCIAL STABILITY: SOCIAL NETWORK: HOW OFTEN DO YOU ATTEND CHURCH OR RELIGIOUS SERVICES?: PATIENT DECLINED

## 2024-10-24 SDOH — HEALTH STABILITY: PHYSICAL HEALTH
HOW OFTEN DO YOU NEED TO HAVE SOMEONE HELP YOU WHEN YOU READ INSTRUCTIONS, PAMPHLETS, OR OTHER WRITTEN MATERIAL FROM YOUR DOCTOR OR PHARMACY?: PATIENT DECLINES TO RESPOND

## 2024-10-24 SDOH — SOCIAL STABILITY: SOCIAL INSECURITY: ARE YOU MARRIED, WIDOWED, DIVORCED, SEPARATED, NEVER MARRIED, OR LIVING WITH A PARTNER?: PATIENT DECLINED

## 2024-10-24 SDOH — HEALTH STABILITY: PHYSICAL HEALTH: ON AVERAGE, HOW MANY MINUTES DO YOU ENGAGE IN EXERCISE AT THIS LEVEL?: PATIENT DECLINED

## 2024-10-24 SDOH — SOCIAL STABILITY: SOCIAL INSECURITY
WITHIN THE LAST YEAR, HAVE YOU BEEN RAPED OR FORCED TO HAVE ANY KIND OF SEXUAL ACTIVITY BY YOUR PARTNER OR EX-PARTNER?: NO

## 2024-10-24 SDOH — SOCIAL STABILITY: SOCIAL INSECURITY: HAVE YOU HAD ANY THOUGHTS OF HARMING ANYONE ELSE?: NO

## 2024-10-24 SDOH — SOCIAL STABILITY: SOCIAL INSECURITY: DOES ANYONE TRY TO KEEP YOU FROM HAVING/CONTACTING OTHER FRIENDS OR DOING THINGS OUTSIDE YOUR HOME?: NO

## 2024-10-24 SDOH — HEALTH STABILITY: PHYSICAL HEALTH
HOW OFTEN DO YOU NEED TO HAVE SOMEONE HELP YOU WHEN YOU READ INSTRUCTIONS, PAMPHLETS, OR OTHER WRITTEN MATERIAL FROM YOUR DOCTOR OR PHARMACY?: NEVER

## 2024-10-24 SDOH — SOCIAL STABILITY: SOCIAL NETWORK: HOW OFTEN DO YOU GET TOGETHER WITH FRIENDS OR RELATIVES?: PATIENT DECLINED

## 2024-10-24 SDOH — SOCIAL STABILITY: SOCIAL INSECURITY: HAVE YOU HAD THOUGHTS OF HARMING ANYONE ELSE?: NO

## 2024-10-24 SDOH — ECONOMIC STABILITY: FOOD INSECURITY: WITHIN THE PAST 12 MONTHS, THE FOOD YOU BOUGHT JUST DIDN'T LAST AND YOU DIDN'T HAVE MONEY TO GET MORE.: PATIENT DECLINED

## 2024-10-24 SDOH — HEALTH STABILITY: PHYSICAL HEALTH
ON AVERAGE, HOW MANY DAYS PER WEEK DO YOU ENGAGE IN MODERATE TO STRENUOUS EXERCISE (LIKE A BRISK WALK)?: PATIENT DECLINED

## 2024-10-24 SDOH — SOCIAL STABILITY: SOCIAL INSECURITY: DO YOU FEEL UNSAFE GOING BACK TO THE PLACE WHERE YOU ARE LIVING?: NO

## 2024-10-24 SDOH — SOCIAL STABILITY: SOCIAL NETWORK
DO YOU BELONG TO ANY CLUBS OR ORGANIZATIONS SUCH AS CHURCH GROUPS, UNIONS, FRATERNAL OR ATHLETIC GROUPS, OR SCHOOL GROUPS?: PATIENT DECLINED

## 2024-10-24 SDOH — SOCIAL STABILITY: SOCIAL INSECURITY
WITHIN THE LAST YEAR, HAVE YOU BEEN KICKED, HIT, SLAPPED, OR OTHERWISE PHYSICALLY HURT BY YOUR PARTNER OR EX-PARTNER?: NO

## 2024-10-24 SDOH — SOCIAL STABILITY: SOCIAL INSECURITY: ARE YOU OR HAVE YOU BEEN THREATENED OR ABUSED PHYSICALLY, EMOTIONALLY, OR SEXUALLY BY ANYONE?: NO

## 2024-10-24 SDOH — SOCIAL STABILITY: SOCIAL NETWORK: IN A TYPICAL WEEK, HOW MANY TIMES DO YOU TALK ON THE PHONE WITH FAMILY, FRIENDS, OR NEIGHBORS?: PATIENT DECLINED

## 2024-10-24 SDOH — ECONOMIC STABILITY: INCOME INSECURITY
IN THE PAST 12 MONTHS HAS THE ELECTRIC, GAS, OIL, OR WATER COMPANY THREATENED TO SHUT OFF SERVICES IN YOUR HOME?: PATIENT DECLINED

## 2024-10-24 SDOH — ECONOMIC STABILITY: HOUSING INSECURITY: AT ANY TIME IN THE PAST 12 MONTHS, WERE YOU HOMELESS OR LIVING IN A SHELTER (INCLUDING NOW)?: PATIENT DECLINED

## 2024-10-24 SDOH — ECONOMIC STABILITY: FOOD INSECURITY: HOW HARD IS IT FOR YOU TO PAY FOR THE VERY BASICS LIKE FOOD, HOUSING, MEDICAL CARE, AND HEATING?: PATIENT DECLINED

## 2024-10-24 SDOH — SOCIAL STABILITY: SOCIAL INSECURITY: ARE THERE ANY APPARENT SIGNS OF INJURIES/BEHAVIORS THAT COULD BE RELATED TO ABUSE/NEGLECT?: NO

## 2024-10-24 SDOH — ECONOMIC STABILITY: HOUSING INSECURITY: IN THE LAST 12 MONTHS, WAS THERE A TIME WHEN YOU WERE NOT ABLE TO PAY THE MORTGAGE OR RENT ON TIME?: PATIENT DECLINED

## 2024-10-24 SDOH — SOCIAL STABILITY: SOCIAL NETWORK: HOW OFTEN DO YOU ATTEND MEETINGS OF THE CLUBS OR ORGANIZATIONS YOU BELONG TO?: PATIENT DECLINED

## 2024-10-24 SDOH — HEALTH STABILITY: MENTAL HEALTH
DO YOU FEEL STRESS - TENSE, RESTLESS, NERVOUS, OR ANXIOUS, OR UNABLE TO SLEEP AT NIGHT BECAUSE YOUR MIND IS TROUBLED ALL THE TIME - THESE DAYS?: PATIENT DECLINED

## 2024-10-24 SDOH — SOCIAL STABILITY: SOCIAL INSECURITY: HAS ANYONE EVER THREATENED TO HURT YOUR FAMILY OR YOUR PETS?: NO

## 2024-10-24 SDOH — ECONOMIC STABILITY: TRANSPORTATION INSECURITY
IN THE PAST 12 MONTHS, HAS LACK OF TRANSPORTATION KEPT YOU FROM MEDICAL APPOINTMENTS OR FROM GETTING MEDICATIONS?: PATIENT DECLINED

## 2024-10-24 SDOH — SOCIAL STABILITY: SOCIAL INSECURITY: WERE YOU ABLE TO COMPLETE ALL THE BEHAVIORAL HEALTH SCREENINGS?: YES

## 2024-10-24 SDOH — SOCIAL STABILITY: SOCIAL INSECURITY: ABUSE: ADULT

## 2024-10-24 SDOH — SOCIAL STABILITY: SOCIAL INSECURITY: DO YOU FEEL ANYONE HAS EXPLOITED OR TAKEN ADVANTAGE OF YOU FINANCIALLY OR OF YOUR PERSONAL PROPERTY?: NO

## 2024-10-24 SDOH — ECONOMIC STABILITY: FOOD INSECURITY
WITHIN THE PAST 12 MONTHS, YOU WORRIED THAT YOUR FOOD WOULD RUN OUT BEFORE YOU GOT THE MONEY TO BUY MORE.: PATIENT DECLINED

## 2024-10-24 ASSESSMENT — ENCOUNTER SYMPTOMS
DYSURIA: 0
NAUSEA: 0
HEMATURIA: 0
VOMITING: 0
CHILLS: 0
FREQUENCY: 0
DIARRHEA: 0
ABDOMINAL PAIN: 0
DIZZINESS: 1
SHORTNESS OF BREATH: 0
FLANK PAIN: 0
PALPITATIONS: 0
FEVER: 0
CONSTIPATION: 0

## 2024-10-24 ASSESSMENT — ACTIVITIES OF DAILY LIVING (ADL)
GROOMING: INDEPENDENT
TOILETING: INDEPENDENT
FEEDING YOURSELF: INDEPENDENT
BATHING: INDEPENDENT
LACK_OF_TRANSPORTATION: PATIENT DECLINED
DRESSING YOURSELF: INDEPENDENT
JUDGMENT_ADEQUATE_SAFELY_COMPLETE_DAILY_ACTIVITIES: YES
HEARING - RIGHT EAR: HEARING AID
HEARING - LEFT EAR: HEARING AID
LACK_OF_TRANSPORTATION: PATIENT DECLINED
LACK_OF_TRANSPORTATION: NO
WALKS IN HOME: NEEDS ASSISTANCE
ADEQUATE_TO_COMPLETE_ADL: YES
PATIENT'S MEMORY ADEQUATE TO SAFELY COMPLETE DAILY ACTIVITIES?: YES
LACK_OF_TRANSPORTATION: PATIENT DECLINED

## 2024-10-24 ASSESSMENT — COLUMBIA-SUICIDE SEVERITY RATING SCALE - C-SSRS
2. HAVE YOU ACTUALLY HAD ANY THOUGHTS OF KILLING YOURSELF?: NO
6. HAVE YOU EVER DONE ANYTHING, STARTED TO DO ANYTHING, OR PREPARED TO DO ANYTHING TO END YOUR LIFE?: NO
1. IN THE PAST MONTH, HAVE YOU WISHED YOU WERE DEAD OR WISHED YOU COULD GO TO SLEEP AND NOT WAKE UP?: NO

## 2024-10-24 ASSESSMENT — COGNITIVE AND FUNCTIONAL STATUS - GENERAL
DAILY ACTIVITIY SCORE: 24
CLIMB 3 TO 5 STEPS WITH RAILING: A LITTLE
MOBILITY SCORE: 22
WALKING IN HOSPITAL ROOM: A LITTLE
PATIENT BASELINE BEDBOUND: NO

## 2024-10-24 ASSESSMENT — LIFESTYLE VARIABLES
SKIP TO QUESTIONS 9-10: 1
HOW OFTEN DO YOU HAVE A DRINK CONTAINING ALCOHOL: 2-4 TIMES A MONTH
AUDIT-C TOTAL SCORE: 2
HOW OFTEN DO YOU HAVE 6 OR MORE DRINKS ON ONE OCCASION: NEVER
AUDIT-C TOTAL SCORE: 2
HOW MANY STANDARD DRINKS CONTAINING ALCOHOL DO YOU HAVE ON A TYPICAL DAY: 1 OR 2

## 2024-10-24 ASSESSMENT — PAIN SCALES - GENERAL
PAINLEVEL_OUTOF10: 0 - NO PAIN
PAINLEVEL_OUTOF10: 0 - NO PAIN

## 2024-10-24 ASSESSMENT — PAIN - FUNCTIONAL ASSESSMENT
PAIN_FUNCTIONAL_ASSESSMENT: 0-10
PAIN_FUNCTIONAL_ASSESSMENT: 0-10

## 2024-10-24 NOTE — ED PROVIDER NOTES
HPI: The patient is a HPI: The patient is a 84-year-old with history of stroke BPV hyperlipidemia hypertension hearing loss who is currently on Eliquis for stroke prophylaxis who presents to the Emergency Department with a chief complaint of told to come in by her cardiologist.  Patient reports longstanding history of dizziness.  Attributes this to her vertigo.  She denies any headache falls trauma or any focal numbness tingling or weakness.  Denies any chest pain or shortness of breath.  She reports that her heart rate has been low in the past and she was taken off of atenolol due to this.  She reports her heart rate is typically in the 50s and as she sits here feels completely fine.  She reports that due to have an arrhythmia, she was told to come into the hospital so she can be admitted for pacer.     PAST MEDICAL HISTORY:  as per HPI  ALLERGIES:  as per HPI  MEDICATIONS:  as per HPI  FAMILY HISTORY: as per HPI  SURGICAL HISTORY: as per HPI  SOCIAL HISTORY: as per HPI     PHYSICAL EXAM:  VITAL SIGNS: Nursing notes reviewed.  GENERAL:  Alert and interactive  EYES:   Eyes track.  ENT:  Airway patent.  RESPIRATORY:  Nonlabored breathing.  CARDIOVASCULAR:  [Regular rate.] [Regular rhythm.]  GASTROINTESTINAL:  No distension.  MUSCULOSKELETAL:  No deformity.   NEUROLOGICAL:  Awake.  SKIN:  Dry.        MEDICAL DECISION MAKING (MDM):     DIAGNOSTIC STUDIES  Labs: Coagulation screen, BNP, CBC, CMP, Agnesian him, troponin  Radiology: X-ray     EKG 1959  Per my interpretation:  Electrocardiogram ECG  RATE:  [Normal]  RHYTHM: [Sinus]  AXIS: [Normal]  INTERVALS: [Normal]  ST-T WAVE CHANGES: [Normal]  ABNORMALITIES/COMPARISON: Unchanged from most recent EKG on [facility] first 2024    EKG 2127  Per my interpretation:  Electrocardiogram ECG  RATE:  [Normal]  RHYTHM: [Sinus]  AXIS: [Normal]  INTERVALS: [Normal]  ST-T WAVE CHANGES: [Normal]  ABNORMALITIES/COMPARISON: Unchanged from later today         REVIEW OF OLD RECORDS: Reviewed  the DC summary from 9/2/2024 as well as the neurology note from 9/1/2024 and the EP note from earlier today     SUMMARY:  The patient is admitted to the Emergency Department for evaluation of above. Complete history and physical examination was performed by me.  Per the report of the EP note, the patient had a sinus pause for almost 10 seconds and due to this, the plan is to admit her so she get an urgent pacer for symptomatic bradycardia.  When I assessed her, she has no signs of bradycardia or cardiogenic shock and looks very well and is currently asymptomatic.  We placed on the cardiac monitor obtain EKG and blood work and obtain a chest x-ray.  I did speak to her electrophysiologist, Dr. Corrales who request that we admit the patient with plan to place a pacer in the morning and request that we hold her anticoagulation in the meantime.  I spoke with the hospitalist service and discussed the recommendations of electrophysiology and they excepted patient for ongoing care.     DIAGNOSIS:    Bradycardia     DISPOSITION:    1) admission      Tariq Thompson MD  10/24/24 [id number]

## 2024-10-24 NOTE — PROGRESS NOTES
Call was received from yeny regarding + strips showing multiple pauses up to 9.7 seconds.  Shakila reveiwed the strips.  Shakila contacted Dr. Corrales and the decision made for her to go to the ER for further evaluation with possible pacemaker.  I spoke with the patient directly and she understands the reason for the ER visit due to the pauses.  Also, the possibility of a pacemaker.  She is in agreement with this.  I called the ER at [facility], spoke with Juan, to let them know she would be coming in.

## 2024-10-24 NOTE — H&P
"History Of Present Illness  Stefany France is a 84 y.o. female with a past medical history of  CVA (2019), BPPV, HLD, HTN, migraine, and hearing loss who presented to the ED from Dr. Corrales's office after her Ziopatch noted sinus pauses up to 9.7 seconds in length during night time hours. Multiple additional pauses were also noted. She was sent to be admitted for a PPM/AICD in the morning. She denies any current complaints in the ED, but notes she has been dizzy \"for awhile\". She took her home Eliquis this morning.      Past Medical History  Past Medical History:   Diagnosis Date    TIA (transient ischemic attack) 09/2024    Transient cerebral ischemic attack, unspecified     Mini stroke       Surgical History  Past Surgical History:   Procedure Laterality Date    OTHER SURGICAL HISTORY  06/21/2022    Breast biopsy    OTHER SURGICAL HISTORY  06/21/2022    Cardiac catheterization with stent placement    OTHER SURGICAL HISTORY  06/21/2022    Dermatological surgery    OTHER SURGICAL HISTORY  06/21/2022    Dilation and curettage    OTHER SURGICAL HISTORY  06/21/2022    Tonsillectomy    OTHER SURGICAL HISTORY  06/21/2022    Lithotripsy        Social History  She reports that she quit smoking about 25 years ago. Her smoking use included cigarettes. She has never used smokeless tobacco. She reports current alcohol use. She reports that she does not use drugs.    Family History  No family history on file.     Allergies  Ace inhibitors    Review of Systems   Constitutional:  Negative for chills and fever.   Respiratory:  Negative for shortness of breath.    Cardiovascular:  Negative for chest pain and palpitations.   Gastrointestinal:  Negative for abdominal pain, constipation, diarrhea, nausea and vomiting.   Genitourinary:  Negative for dysuria, flank pain, frequency, hematuria and urgency.   Neurological:  Positive for dizziness.   All other systems reviewed and are negative.       Physical Exam  Vitals reviewed.   HENT:      " "Head: Normocephalic and atraumatic.   Cardiovascular:      Rate and Rhythm: Normal rate and regular rhythm.      Heart sounds: Normal heart sounds.   Pulmonary:      Effort: Pulmonary effort is normal.      Breath sounds: Normal air entry.   Abdominal:      General: Bowel sounds are normal.      Palpations: Abdomen is soft.      Tenderness: There is no abdominal tenderness.   Musculoskeletal:         General: No deformity.   Skin:     General: Skin is warm and dry.   Neurological:      General: No focal deficit present.      Mental Status: She is alert and oriented to person, place, and time.   Psychiatric:         Mood and Affect: Mood normal.         Behavior: Behavior normal.          Last Recorded Vitals  Blood pressure (!) 168/97, pulse 90, temperature 36.7 °C (98.1 °F), temperature source Temporal, resp. rate 18, height 1.6 m (5' 3\"), weight 76.2 kg (168 lb), SpO2 98%.    Relevant Results      Lab Results   Component Value Date    WBC 6.9 09/24/2024    HGB 12.1 09/24/2024    HCT 40.5 09/24/2024    MCV 84 09/24/2024     09/24/2024     Lab Results   Component Value Date    GLUCOSE 118 (H) 09/28/2024    CALCIUM 8.4 (L) 09/28/2024     09/28/2024    K 3.8 09/28/2024    CO2 27 09/28/2024     (H) 09/28/2024    BUN 14 09/28/2024    CREATININE 0.96 09/28/2024     Holter Or Event Cardiac Monitor  This is a Holter monitor for 14 days.    The underlying rhythm was sinus rhythm but there were episodes of atrial   fibrillation during this study.  The burden of atrial fibrillation was 17%   of the time.  The rates during atrial fibrillation were between    bpm.  Looking at the rhythm strips, there were episodes of pauses up to   9.7 seconds of duration with episodes of junctional rhythm.  Pauses during   daytime hours when sleep time.    The minimum heart rate was 36 bpm, the maximal heart rate was 118 bpm with   average heart rate of 59 bpm.    There were occasional isolated PVCs but no evidence of " sustained   ventricular arrhythmias    No significant symptoms were seen during this study    Conclusion    Underlying rhythm sinus rhythm.    Episodes of atrial fibrillation rapid ventricular response seen during   this study with burden of atrial fibrillation approximately 17% of the   time.    Significant pauses were seen during study (17 episodes) up to 9.7 seconds   of duration.  Clinical correlation is to be made         Assessment/Plan   Assessment & Plan  Dizziness      #Dizziness  #Sinus pauses  -Consult cardiology  -NPO MN  -NO DVT chemoppx, SCDs  -Telemetry    #HTN  #HLD  -Continue home medications pending nursing review           JESSE Elizabeth-CNP

## 2024-10-24 NOTE — PROGRESS NOTES
Zio patch monitor reviewed.  Sinus pauses up to 9.7 seconds in duration were noted during the nighttime hours.  Multiple additional sinus pauses were also noted.  Review of medical records reveals the patient has a history of CVA and is on Eliquis.  Contacted Dr. Corrales.  Patient was instructed to present to the emergency room for admission.  Eliquis will be discontinued and the patient will be placed on heparin/Lovenox in preparation for dual-chamber pacemaker implant.  Further recommendations will be pending the clinical course.  Patient was notified by PURNIMA Robin MA to present to the emergency room.  All questions were answered.

## 2024-10-24 NOTE — Clinical Note
Patient ID band present and verified. Patient contact is not present. Calling her daughter Sia when finished.

## 2024-10-24 NOTE — Clinical Note
The PACEMAKER, DUAL CHAMBER, KAYLA MRI XT DR - [id number] device was inserted. The leads were placed into the connector and visually verified to be in correct position. Injury current obtained.

## 2024-10-25 ENCOUNTER — APPOINTMENT (OUTPATIENT)
Dept: RADIOLOGY | Facility: HOSPITAL | Age: 84
DRG: 244 | End: 2024-10-25
Payer: MEDICARE

## 2024-10-25 ENCOUNTER — APPOINTMENT (OUTPATIENT)
Dept: CARDIOLOGY | Facility: HOSPITAL | Age: 84
DRG: 244 | End: 2024-10-25
Payer: MEDICARE

## 2024-10-25 DIAGNOSIS — I49.5 SICK SINUS SYNDROME (MULTI): ICD-10-CM

## 2024-10-25 DIAGNOSIS — Z95.0 CARDIAC PACEMAKER IN SITU: Primary | ICD-10-CM

## 2024-10-25 LAB
ANION GAP SERPL CALC-SCNC: 11 MMOL/L (ref 10–20)
ATRIAL RATE: 82 BPM
ATRIAL RATE: 83 BPM
BUN SERPL-MCNC: 13 MG/DL (ref 6–23)
CALCIUM SERPL-MCNC: 8.6 MG/DL (ref 8.6–10.3)
CHLORIDE SERPL-SCNC: 108 MMOL/L (ref 98–107)
CO2 SERPL-SCNC: 25 MMOL/L (ref 21–32)
CREAT SERPL-MCNC: 0.84 MG/DL (ref 0.5–1.05)
EGFRCR SERPLBLD CKD-EPI 2021: 69 ML/MIN/1.73M*2
ERYTHROCYTE [DISTWIDTH] IN BLOOD BY AUTOMATED COUNT: 15 % (ref 11.5–14.5)
GLUCOSE SERPL-MCNC: 118 MG/DL (ref 74–99)
HCT VFR BLD AUTO: 37.4 % (ref 36–46)
HGB BLD-MCNC: 11.4 G/DL (ref 12–16)
MCH RBC QN AUTO: 24.9 PG (ref 26–34)
MCHC RBC AUTO-ENTMCNC: 30.5 G/DL (ref 32–36)
MCV RBC AUTO: 82 FL (ref 80–100)
NRBC BLD-RTO: 0 /100 WBCS (ref 0–0)
P AXIS: 66 DEGREES
P AXIS: 70 DEGREES
P OFFSET: 178 MS
P OFFSET: 181 MS
P ONSET: 152 MS
P ONSET: 155 MS
PLATELET # BLD AUTO: 227 X10*3/UL (ref 150–450)
POTASSIUM SERPL-SCNC: 3.3 MMOL/L (ref 3.5–5.3)
PR INTERVAL: 134 MS
PR INTERVAL: 142 MS
Q ONSET: 219 MS
Q ONSET: 226 MS
QRS COUNT: 13 BEATS
QRS COUNT: 14 BEATS
QRS DURATION: 62 MS
QRS DURATION: 72 MS
QT INTERVAL: 378 MS
QT INTERVAL: 404 MS
QTC CALCULATION(BAZETT): 441 MS
QTC CALCULATION(BAZETT): 474 MS
QTC FREDERICIA: 419 MS
QTC FREDERICIA: 450 MS
R AXIS: 20 DEGREES
R AXIS: 6 DEGREES
RBC # BLD AUTO: 4.57 X10*6/UL (ref 4–5.2)
SODIUM SERPL-SCNC: 141 MMOL/L (ref 136–145)
T AXIS: 101 DEGREES
T AXIS: 40 DEGREES
T OFFSET: 415 MS
T OFFSET: 421 MS
VENTRICULAR RATE: 82 BPM
VENTRICULAR RATE: 83 BPM
WBC # BLD AUTO: 6.1 X10*3/UL (ref 4.4–11.3)

## 2024-10-25 PROCEDURE — 2500000001 HC RX 250 WO HCPCS SELF ADMINISTERED DRUGS (ALT 637 FOR MEDICARE OP)

## 2024-10-25 PROCEDURE — 33208 INSRT HEART PM ATRIAL & VENT: CPT | Performed by: INTERNAL MEDICINE

## 2024-10-25 PROCEDURE — 2500000001 HC RX 250 WO HCPCS SELF ADMINISTERED DRUGS (ALT 637 FOR MEDICARE OP): Performed by: NURSE PRACTITIONER

## 2024-10-25 PROCEDURE — 92960 CARDIOVERSION ELECTRIC EXT: CPT | Performed by: INTERNAL MEDICINE

## 2024-10-25 PROCEDURE — 2780000003 HC OR 278 NO HCPCS: Performed by: INTERNAL MEDICINE

## 2024-10-25 PROCEDURE — 2500000004 HC RX 250 GENERAL PHARMACY W/ HCPCS (ALT 636 FOR OP/ED): Performed by: NURSE PRACTITIONER

## 2024-10-25 PROCEDURE — 71045 X-RAY EXAM CHEST 1 VIEW: CPT | Performed by: RADIOLOGY

## 2024-10-25 PROCEDURE — 93280 PM DEVICE PROGR EVAL DUAL: CPT

## 2024-10-25 PROCEDURE — 0JH606Z INSERTION OF PACEMAKER, DUAL CHAMBER INTO CHEST SUBCUTANEOUS TISSUE AND FASCIA, OPEN APPROACH: ICD-10-PCS | Performed by: INTERNAL MEDICINE

## 2024-10-25 PROCEDURE — 93010 ELECTROCARDIOGRAM REPORT: CPT | Performed by: INTERNAL MEDICINE

## 2024-10-25 PROCEDURE — C1769 GUIDE WIRE: HCPCS | Performed by: INTERNAL MEDICINE

## 2024-10-25 PROCEDURE — 7100000001 HC RECOVERY ROOM TIME - INITIAL BASE CHARGE: Performed by: INTERNAL MEDICINE

## 2024-10-25 PROCEDURE — 2720000007 HC OR 272 NO HCPCS: Performed by: INTERNAL MEDICINE

## 2024-10-25 PROCEDURE — 99233 SBSQ HOSP IP/OBS HIGH 50: CPT

## 2024-10-25 PROCEDURE — 7100000010 HC PHASE TWO TIME - EACH INCREMENTAL 1 MINUTE: Performed by: INTERNAL MEDICINE

## 2024-10-25 PROCEDURE — C1898 LEAD, PMKR, OTHER THAN TRANS: HCPCS | Performed by: INTERNAL MEDICINE

## 2024-10-25 PROCEDURE — 2500000004 HC RX 250 GENERAL PHARMACY W/ HCPCS (ALT 636 FOR OP/ED): Performed by: INTERNAL MEDICINE

## 2024-10-25 PROCEDURE — 36415 COLL VENOUS BLD VENIPUNCTURE: CPT | Performed by: NURSE PRACTITIONER

## 2024-10-25 PROCEDURE — 85027 COMPLETE CBC AUTOMATED: CPT | Performed by: NURSE PRACTITIONER

## 2024-10-25 PROCEDURE — 99024 POSTOP FOLLOW-UP VISIT: CPT | Performed by: NURSE PRACTITIONER

## 2024-10-25 PROCEDURE — 2750000001 HC OR 275 NO HCPCS: Performed by: INTERNAL MEDICINE

## 2024-10-25 PROCEDURE — 2500000004 HC RX 250 GENERAL PHARMACY W/ HCPCS (ALT 636 FOR OP/ED): Performed by: HOSPITALIST

## 2024-10-25 PROCEDURE — C1892 INTRO/SHEATH,FIXED,PEEL-AWAY: HCPCS | Performed by: INTERNAL MEDICINE

## 2024-10-25 PROCEDURE — 02H63JZ INSERTION OF PACEMAKER LEAD INTO RIGHT ATRIUM, PERCUTANEOUS APPROACH: ICD-10-PCS | Performed by: INTERNAL MEDICINE

## 2024-10-25 PROCEDURE — 99152 MOD SED SAME PHYS/QHP 5/>YRS: CPT | Performed by: INTERNAL MEDICINE

## 2024-10-25 PROCEDURE — 99153 MOD SED SAME PHYS/QHP EA: CPT | Performed by: INTERNAL MEDICINE

## 2024-10-25 PROCEDURE — 93005 ELECTROCARDIOGRAM TRACING: CPT

## 2024-10-25 PROCEDURE — 80048 BASIC METABOLIC PNL TOTAL CA: CPT | Performed by: NURSE PRACTITIONER

## 2024-10-25 PROCEDURE — 2500000002 HC RX 250 W HCPCS SELF ADMINISTERED DRUGS (ALT 637 FOR MEDICARE OP, ALT 636 FOR OP/ED)

## 2024-10-25 PROCEDURE — 1200000002 HC GENERAL ROOM WITH TELEMETRY DAILY

## 2024-10-25 PROCEDURE — 02HK3JZ INSERTION OF PACEMAKER LEAD INTO RIGHT VENTRICLE, PERCUTANEOUS APPROACH: ICD-10-PCS | Performed by: INTERNAL MEDICINE

## 2024-10-25 PROCEDURE — C1785 PMKR, DUAL, RATE-RESP: HCPCS | Performed by: INTERNAL MEDICINE

## 2024-10-25 PROCEDURE — 71045 X-RAY EXAM CHEST 1 VIEW: CPT

## 2024-10-25 PROCEDURE — 7100000009 HC PHASE TWO TIME - INITIAL BASE CHARGE: Performed by: INTERNAL MEDICINE

## 2024-10-25 PROCEDURE — 2500000004 HC RX 250 GENERAL PHARMACY W/ HCPCS (ALT 636 FOR OP/ED): Performed by: STUDENT IN AN ORGANIZED HEALTH CARE EDUCATION/TRAINING PROGRAM

## 2024-10-25 PROCEDURE — 99223 1ST HOSP IP/OBS HIGH 75: CPT | Performed by: INTERNAL MEDICINE

## 2024-10-25 PROCEDURE — 7100000002 HC RECOVERY ROOM TIME - EACH INCREMENTAL 1 MINUTE: Performed by: INTERNAL MEDICINE

## 2024-10-25 DEVICE — LEAD 5076-52 CAPSUREFIX NOVUS US EN
Type: IMPLANTABLE DEVICE | Site: CHEST | Status: FUNCTIONAL
Brand: CAPSUREFIX® NOVUS

## 2024-10-25 DEVICE — IPG W1DR01 AZURE XT DR MRI WL USA BCP
Type: IMPLANTABLE DEVICE | Site: CHEST | Status: FUNCTIONAL
Brand: AZURE™ XT DR MRI SURESCAN™

## 2024-10-25 DEVICE — LEAD 5076-45 CAPSUREFIX NOVUS US EN
Type: IMPLANTABLE DEVICE | Site: CHEST | Status: FUNCTIONAL
Brand: CAPSUREFIX® NOVUS

## 2024-10-25 RX ORDER — FENTANYL CITRATE 50 UG/ML
INJECTION, SOLUTION INTRAMUSCULAR; INTRAVENOUS AS NEEDED
Status: DISCONTINUED | OUTPATIENT
Start: 2024-10-25 | End: 2024-10-26 | Stop reason: HOSPADM

## 2024-10-25 RX ORDER — MIDAZOLAM HYDROCHLORIDE 1 MG/ML
INJECTION, SOLUTION INTRAMUSCULAR; INTRAVENOUS AS NEEDED
Status: DISCONTINUED | OUTPATIENT
Start: 2024-10-25 | End: 2024-10-26 | Stop reason: HOSPADM

## 2024-10-25 RX ORDER — MUPIROCIN 20 MG/G
1 OINTMENT TOPICAL ONCE
Status: DISCONTINUED | OUTPATIENT
Start: 2024-10-26 | End: 2024-10-26 | Stop reason: HOSPADM

## 2024-10-25 RX ORDER — METOPROLOL TARTRATE 25 MG/1
25 TABLET, FILM COATED ORAL 2 TIMES DAILY
Qty: 60 TABLET | Refills: 5 | Status: SHIPPED | OUTPATIENT
Start: 2024-10-25 | End: 2025-04-23

## 2024-10-25 RX ORDER — METOPROLOL TARTRATE 25 MG/1
25 TABLET, FILM COATED ORAL 2 TIMES DAILY
Status: DISCONTINUED | OUTPATIENT
Start: 2024-10-25 | End: 2024-10-26 | Stop reason: HOSPADM

## 2024-10-25 RX ORDER — AMLODIPINE BESYLATE 5 MG/1
10 TABLET ORAL DAILY
Status: DISCONTINUED | OUTPATIENT
Start: 2024-10-25 | End: 2024-10-26 | Stop reason: HOSPADM

## 2024-10-25 RX ORDER — MAGNESIUM SULFATE HEPTAHYDRATE 40 MG/ML
2 INJECTION, SOLUTION INTRAVENOUS ONCE
Status: COMPLETED | OUTPATIENT
Start: 2024-10-25 | End: 2024-10-25

## 2024-10-25 RX ORDER — PANTOPRAZOLE SODIUM 40 MG/1
40 TABLET, DELAYED RELEASE ORAL
Status: DISCONTINUED | OUTPATIENT
Start: 2024-10-26 | End: 2024-10-26 | Stop reason: HOSPADM

## 2024-10-25 RX ORDER — TRAMADOL HYDROCHLORIDE 50 MG/1
50 TABLET ORAL EVERY 6 HOURS PRN
Status: DISCONTINUED | OUTPATIENT
Start: 2024-10-25 | End: 2024-10-26 | Stop reason: HOSPADM

## 2024-10-25 RX ORDER — HYDRALAZINE HYDROCHLORIDE 25 MG/1
25 TABLET, FILM COATED ORAL 2 TIMES DAILY
Status: DISCONTINUED | OUTPATIENT
Start: 2024-10-25 | End: 2024-10-26 | Stop reason: HOSPADM

## 2024-10-25 RX ORDER — HYDROCHLOROTHIAZIDE 25 MG/1
25 TABLET ORAL DAILY
Status: DISCONTINUED | OUTPATIENT
Start: 2024-10-25 | End: 2024-10-26 | Stop reason: HOSPADM

## 2024-10-25 RX ORDER — VANCOMYCIN HYDROCHLORIDE 1 G/200ML
1000 INJECTION, SOLUTION INTRAVENOUS ONCE
Status: COMPLETED | OUTPATIENT
Start: 2024-10-25 | End: 2024-10-25

## 2024-10-25 RX ORDER — LIDOCAINE HYDROCHLORIDE 10 MG/ML
INJECTION, SOLUTION EPIDURAL; INFILTRATION; INTRACAUDAL; PERINEURAL AS NEEDED
Status: DISCONTINUED | OUTPATIENT
Start: 2024-10-25 | End: 2024-10-26 | Stop reason: HOSPADM

## 2024-10-25 RX ORDER — IBUTILIDE FUMARATE 1 MG/10ML
INJECTION, SOLUTION INTRAVENOUS CONTINUOUS PRN
Status: DISCONTINUED | OUTPATIENT
Start: 2024-10-25 | End: 2024-10-26 | Stop reason: HOSPADM

## 2024-10-25 RX ORDER — ATORVASTATIN CALCIUM 20 MG/1
40 TABLET, FILM COATED ORAL DAILY
Status: DISCONTINUED | OUTPATIENT
Start: 2024-10-25 | End: 2024-10-26 | Stop reason: HOSPADM

## 2024-10-25 RX ORDER — CHLORHEXIDINE GLUCONATE 40 MG/ML
SOLUTION TOPICAL ONCE
Status: DISCONTINUED | OUTPATIENT
Start: 2024-10-26 | End: 2024-10-26 | Stop reason: HOSPADM

## 2024-10-25 RX ORDER — METOPROLOL TARTRATE 1 MG/ML
INJECTION, SOLUTION INTRAVENOUS AS NEEDED
Status: DISCONTINUED | OUTPATIENT
Start: 2024-10-25 | End: 2024-10-26 | Stop reason: HOSPADM

## 2024-10-25 RX ORDER — CLOPIDOGREL BISULFATE 75 MG/1
75 TABLET ORAL DAILY
Status: DISCONTINUED | OUTPATIENT
Start: 2024-10-25 | End: 2024-10-26 | Stop reason: HOSPADM

## 2024-10-25 RX ORDER — ONDANSETRON HYDROCHLORIDE 2 MG/ML
4 INJECTION, SOLUTION INTRAVENOUS EVERY 8 HOURS PRN
Status: DISCONTINUED | OUTPATIENT
Start: 2024-10-25 | End: 2024-10-26 | Stop reason: HOSPADM

## 2024-10-25 RX ORDER — POTASSIUM CHLORIDE 20 MEQ/1
40 TABLET, EXTENDED RELEASE ORAL ONCE
Status: COMPLETED | OUTPATIENT
Start: 2024-10-25 | End: 2024-10-25

## 2024-10-25 ASSESSMENT — COGNITIVE AND FUNCTIONAL STATUS - GENERAL
WALKING IN HOSPITAL ROOM: A LITTLE
CLIMB 3 TO 5 STEPS WITH RAILING: A LITTLE
DAILY ACTIVITIY SCORE: 24
MOBILITY SCORE: 22

## 2024-10-25 ASSESSMENT — PAIN SCALES - GENERAL
PAINLEVEL_OUTOF10: 1
PAINLEVEL_OUTOF10: 1
PAINLEVEL_OUTOF10: 0 - NO PAIN
PAINLEVEL_OUTOF10: 1
PAINLEVEL_OUTOF10: 0 - NO PAIN
PAINLEVEL_OUTOF10: 1
PAINLEVEL_OUTOF10: 1

## 2024-10-25 ASSESSMENT — PAIN - FUNCTIONAL ASSESSMENT: PAIN_FUNCTIONAL_ASSESSMENT: 0-10

## 2024-10-25 NOTE — PROGRESS NOTES
"Daily Progress Note    Stefany France is a 84 y.o. female on day 0 of admission presenting with Dizziness.    Subjective   Patient seen and examined.  Patient going for dual-chamber pacemaker at this time.  Patient denies shortness of breath or chest pain.  Showing tachycardia with A-fib on monitor.       Objective     Physical Exam    Physical Exam  Constitutional:       Appearance: Normal appearance.   HENT:      Head: Normocephalic.      Mouth/Throat:      Mouth: Mucous membranes are moist.   Eyes:      Pupils: Pupils are equal, round, and reactive to light.   Cardiovascular:      Rate and Rhythm: Tachycardia present. Rhythm irregular.      Heart sounds: Normal heart sounds, S1 normal and S2 normal.   Pulmonary:      Effort: Pulmonary effort is normal.      Breath sounds: Normal breath sounds.   Abdominal:      General: Bowel sounds are normal.      Palpations: Abdomen is soft.   Musculoskeletal:         General: Normal range of motion.      Cervical back: Neck supple.   Skin:     General: Skin is warm.   Neurological:      Mental Status: She is alert and oriented to person, place, and time.   Psychiatric:         Mood and Affect: Mood normal.         Behavior: Behavior normal.         Last Recorded Vitals  Blood pressure 133/85, pulse (!) 115, temperature 37.1 °C (98.8 °F), resp. rate 16, height 1.6 m (5' 2.99\"), weight 76.2 kg (167 lb 15.9 oz), SpO2 98%.  Intake/Output last 3 Shifts:  No intake/output data recorded.    Medications  Scheduled medications  [START ON 10/26/2024] chlorhexidine, , Topical, Once  FLUoxetine, 40 mg, oral, Daily  magnesium oxide, 400 mg, oral, Daily  [START ON 10/26/2024] mupirocin, 1 Application, Topical, Once  traZODone, 25 mg, oral, Nightly      Continuous medications     PRN medications  PRN medications: acetaminophen **OR** acetaminophen **OR** acetaminophen, fentaNYL PF, hydrALAZINE, lidocaine PF, melatonin, metoprolol tartrate, midazolam, polyethylene glycol    Labs  CBC: "   Results from last 7 days   Lab Units 10/25/24  0535 10/24/24  1951   WBC AUTO x10*3/uL 6.1 8.4   RBC AUTO x10*6/uL 4.57 4.80   HEMOGLOBIN g/dL 11.4* 12.2   HEMATOCRIT % 37.4 38.9   MCV fL 82 81   MCH pg 24.9* 25.4*   MCHC g/dL 30.5* 31.4*   RDW % 15.0* 14.9*   PLATELETS AUTO x10*3/uL 227 277     CMP:    Results from last 7 days   Lab Units 10/25/24  0535 10/24/24  1950   SODIUM mmol/L 141 139   POTASSIUM mmol/L 3.3* 3.8   CHLORIDE mmol/L 108* 104   CO2 mmol/L 25 27   BUN mg/dL 13 17   CREATININE mg/dL 0.84 1.01   GLUCOSE mg/dL 118* 140*   PROTEIN TOTAL g/dL  --  6.4   CALCIUM mg/dL 8.6 8.7   BILIRUBIN TOTAL mg/dL  --  0.5   ALK PHOS U/L  --  104   AST U/L  --  25   ALT U/L  --  30     BMP:    Results from last 7 days   Lab Units 10/25/24  0535 10/24/24  1950   SODIUM mmol/L 141 139   POTASSIUM mmol/L 3.3* 3.8   CHLORIDE mmol/L 108* 104   CO2 mmol/L 25 27   BUN mg/dL 13 17   CREATININE mg/dL 0.84 1.01   CALCIUM mg/dL 8.6 8.7   GLUCOSE mg/dL 118* 140*     Magnesium:  Results from last 7 days   Lab Units 10/24/24  1951   MAGNESIUM mg/dL 1.41*     Troponin:    Results from last 7 days   Lab Units 10/24/24  2124 10/24/24  1950   TROPHS ng/L 3 <3     BNP:   Results from last 7 days   Lab Units 10/24/24  1950   BNP pg/mL 81     Lipid Panel:  Results from last 7 days   Lab Units 10/24/24  1951   INR  1.3*   PROTIME seconds 14.8*        Nutrition             Relevant Results  Results from last 7 days   Lab Units 10/25/24  0535 10/24/24  1950   GLUCOSE mg/dL 118* 140*     Lab Results   Component Value Date    HGBA1C 6.2 (H) 09/28/2024        Assessment/Plan    Bradycardia  Hypomagnesia   hypokalemia  CVA  BPPV  HTN/HLD  Migraine    -Consult cardiology, Holter monitor showed significant pauses up to 7 seconds, plan for pacemaker today  -Patient will most likely need dual-chamber pacemaker implant  -Patient was n.p.o. after midnight  -CBC BMP daily  -Vital signs every shift  -Resume home meds  -Replace magnesium and potassium  as needed  -Eliquis held per cardiology outpatient appointment for procedure today  -PT/OT evaluation  -TCC for discharge planning    DVTp: On hold for procedure     PLAN: lives home alone    JESSE Rodas-CNP    Plan of care was discussed extensively with patient.  Patient verbalized understanding through teach back method.  All question and concerns addressed upon examination.    Of note, this documentation is completed using the Dragon Dictation system (voice recognition software). There may be spelling and/or grammatical errors that were not corrected prior to final submission.

## 2024-10-25 NOTE — CARE PLAN
The patient's goals for the shift include pt will remain safe    The clinical goals for the shift include pt will remain hemodynamically stable

## 2024-10-25 NOTE — PROGRESS NOTES
10/25/24 1203   Discharge Planning   Living Arrangements Alone   Support Systems Children   Type of Residence Private residence   Number of Stairs to Enter Residence 2   Number of Stairs Within Residence 0   Do you have animals or pets at home? No   Home or Post Acute Services Other (Comment)  (tbd post procedure)   Expected Discharge Disposition   (tbd post procedure)     Pt confirms demo' sins and pcp Alyssa Awad. Pt to have PPM/AICD today. Needs TBD post procedure. Lives alone.

## 2024-10-25 NOTE — PROGRESS NOTES
Status post dual-chamber permanent pacemaker implant.  During procedure patient received metoprolol IV as well as covert and converted to junctional rhythm.  Patient now AV paced.  Initiated on metoprolol tartrate 25 mg twice daily.  Resume Eliquis 5 mg twice daily on 10/26/2024 with evening dose.  Post procedure potential complications, activity restrictions, medications, and follow-up reviewed with patient and family.    Portable chest x-ray shows leads in good position, no pneumothorax.  Device check shows normal lead and device function.  Continue to monitor overnight, and okay to discharge in a.m. per EP service.

## 2024-10-25 NOTE — CONSULTS
Inpatient consult to Cardiology  Consult performed by: Simon Corrales MD  Consult ordered by: Stephan Howell, APRN-CNP  Reason for consult: abnormal holer monitor  Assessment/Recommendations: Patient was seen, chart reviewed.    Holter monitor showed significant pauses up to 7 seconds of duration associated with near syncope  Pacemaker indicated.  Procedure, risk, benefits and possible complications were explained to patient.  All questions were answered.  Patient agrees with plan.    Eliquis on hold since yesterday AM  Normal LVEF  Currently in afib with RVR, after pacer is implanted, then will adjust beta blocker therapy . She may require use of AA therapy and we can discuss this as outpatient.    Risk factor modification and lifestyle modification discussed with patient. Diet , exercise and hydration discussed with patient.    I have personally review with patient during this office visit, laboratory data, echocardiogram results, stress test results, Holter-event monitor results prior and after the last electrophysiology visit. All questions has been answered.\      Simon Corrales MD            History Of Present Illness:    Stefany France is a 84 y.o. female presenting with near syncope.    She has a past medical history of coronary artery disease, status post non-ST elevation myocardial infarction, status post drug-eluting stents LAD and left circumflex June 24, 2019, cerebrovascular accident, hypertension, left ventricular hypertrophy, hyperlipidemia patient was recently seen by cardiology service who recommended a Zio patch due to patient was complaining of frequent near syncopal episodes.    Holter monitor October 2024 shows episodes of sinus rhythm with atrial fibrillation with rapid ventricular response with pauses up to 9.7 seconds of duration.  Patient was called for evaluation.    In the emergency department EKG was consistent with sinus rhythm at a rate of 82 bpm QRS duration 62 ms QT corrected 441  ms.  Sodium 139 potassium 3.8 chloride 104 BUN 17 creatinine 1.01 ALT 30 AST 25 magnesium 1.41 BNP 81 troponin 5.3, subsequent troponin 3.  WBC 8.4 hemoglobin 12.2 hematocrit 38.9 platelet count 277    Holter monitor October 4, 2024  This is a Holter monitor for 14 days.     The underlying rhythm was sinus rhythm but there were episodes of atrial fibrillation during this study.  The burden of atrial fibrillation was 17% of the time.  The rates during atrial fibrillation were between  bpm.  Looking at the rhythm strips, there were episodes of pauses up to 9.7 seconds of duration with episodes of junctional rhythm.  Pauses during daytime hours when sleep time.     The minimum heart rate was 36 bpm, the maximal heart rate was 118 bpm with average heart rate of 59 bpm.     There were occasional isolated PVCs but no evidence of sustained ventricular arrhythmias     No significant symptoms were seen during this study     Conclusion     Underlying rhythm sinus rhythm.     Episodes of atrial fibrillation rapid ventricular response seen during this study with burden of atrial fibrillation approximately 17% of the time.     Significant pauses were seen during study (17 episodes) up to 9.7 seconds of duration.  Clinical correlation is to be made    Holter monitor September 20, 2024  Procedure: 14-day event monitor     Date of study: September 20, 2024     Indications: Cerebrovascular accident     Report:  After removal of artifact Patient with 13 days, 23 hours of analysis time.  Baseline rhythm was normal sinus rhythm average rate 55 bpm, minimum rate sinus bradycardia 41 bpm at 5:26 AM, maximum rate 98 bpm.  There were 42 episodes of paroxysmal supraventricular tachycardia, longest duration 16 beats, maximum rate 179 bpm.  There are episodes of atrial fibrillation atrial flutter (5% burden) with heart rates ranging  bpm (average 95 bpm).  The longest episode of atrial fibrillation was 13 hours, 16 minutes.  There  "were 2 pauses longest duration 6 seconds.  Episodes of junctional rhythm were present.  There were rare premature atrial contractions, atrial couplets, atrial triplets.  Rare premature ventricular contractions, ventricular trigeminy.     Conclusions:  Paroxysmal atrial fibrillation/atrial flutter (5% burden) with ventricular rate  bpm.  Sinus pauses with the longest duration 6 seconds.  Paroxysmal supraventricular tachycardia, maximum rate 179 bpm  Baseline rhythm sinus bradycardia average rate 55 bpm, minimum rate 41 bpm.     Please excuse any errors in grammar or translation related to this dictation.  Voice recognition software was utilized to prepare this document.    Transesophageal echocardiogram October 1, 2024     CONCLUSIONS:   1. The left ventricular systolic function is normal, with a visually estimated ejection fraction of 60-65%.   2. Left ventricular diastolic filling was indeterminate.   3. There is normal right ventricular global systolic function.   4. Mild aortic valve regurgitation.   5. No left atrial thrombus.   6. There is plaque visualized in the descending aorta.    Stress test August 2024  IMPRESSION:  Normal Lexiscan Myoview cardiac perfusion stress test.  No evidence of ischemia or myocardial infarction by perfusion imaging.  Normal left ventricular systolic function, ejection fraction 73%.  Noninvasive risk stratification: Low risk.  Comparison study August 11, 2021 was negative for ischemia or  infarction. Calculated LV ejection fraction 83%.             Last Recorded Vitals:  Vitals:    10/24/24 2016 10/24/24 2149 10/24/24 2201 10/25/24 0100   BP: 163/65 172/77  164/74   BP Location:    Right arm   Patient Position:    Lying   Pulse: 89 82  (!) 117   Resp: 18   18   Temp:  35.9 °C (96.6 °F)  36.3 °C (97.3 °F)   TempSrc:    Temporal   SpO2: 98% (!) 92%  93%   Weight:   76.2 kg (167 lb 15.9 oz)    Height:   1.6 m (5' 2.99\")        Last Labs:  CBC - 10/24/2024:  7:51 PM  8.4 12.2 277 "    38.9      CMP - 10/24/2024:  7:51 PM  8.7 6.4 25 --- 0.5   _ 3.9 30 104      PTT - 10/24/2024:  7:51 PM  1.3   14.8 33     Troponin I, High Sensitivity   Date/Time Value Ref Range Status   10/24/2024 09:24 PM 3 0 - 13 ng/L Final   10/24/2024 07:51 PM <3 0 - 13 ng/L Final   08/31/2024 06:05 PM 4 0 - 13 ng/L Final     BNP   Date/Time Value Ref Range Status   10/24/2024 07:51 PM 81 0 - 99 pg/mL Final   08/06/2023 05:00  (H) 0 - 99 pg/mL Final     Comment:     .  <100 pg/mL - Heart failure unlikely  100-299 pg/mL - Intermediate probability of acute heart  .               failure exacerbation. Correlate with clinical  .               context and patient history.    >=300 pg/mL - Heart Failure likely. Correlate with clinical  .               context and patient history.  BNP testing is performed using different testing   methodology at [facility] than at other   system hospitals. Direct result comparisons should   only be made within the same method.     01/22/2020 04:00  (H) 0 - 99 pg/mL Final     Comment:     .  <100 pg/mL - Heart failure unlikely  100-299 pg/mL - Intermediate probability of acute heart  .               failure exacerbation. Correlate with clinical  .               context and patient history.    >=300 pg/mL - Heart Failure likely. Correlate with clinical  .               context and patient history.  BNP testing is performed using different testing   methodology at [facility] than at other   system hospitals. Direct result comparisons should   only be made within the same method.       Hemoglobin A1C   Date/Time Value Ref Range Status   09/28/2024 10:39 AM 6.2 (H) See comment % Final   06/03/2024 12:49 PM 6.6 (H) 4.0 - 6.0 % Final   07/17/2023 09:04 AM 6.5 (A) % Final     Comment:          Diagnosis of Diabetes-Adults   Non-Diabetic: < or = 5.6%   Increased risk for developing diabetes: 5.7-6.4%   Diagnostic of diabetes: > or = 6.5%  .       Monitoring of  Diabetes                Age (y)     Therapeutic Goal (%)   Adults:          >18           <7.0   Pediatrics:    13-18           <7.5                   7-12           <8.0                   0- 6            7.5-8.5   American Diabetes Association. Diabetes Care 33(S1), Jan 2010.     03/18/2023 09:02 AM 6.5 (A) % Final     Comment:          Diagnosis of Diabetes-Adults   Non-Diabetic: < or = 5.6%   Increased risk for developing diabetes: 5.7-6.4%   Diagnostic of diabetes: > or = 6.5%  .       Monitoring of Diabetes                Age (y)     Therapeutic Goal (%)   Adults:          >18           <7.0   Pediatrics:    13-18           <7.5                   7-12           <8.0                   0- 6            7.5-8.5   American Diabetes Association. Diabetes Care 33(S1), Jan 2010.       LDL Calculated   Date/Time Value Ref Range Status   10/23/2023 10:31 AM 53 <=99 mg/dL Final     Comment:                                 Near   Borderline      AGE      Desirable  Optimal    High     High     Very High     0-19 Y     0 - 109     ---    110-129   >/= 130     ----    20-24 Y     0 - 119     ---    120-159   >/= 160     ----      >24 Y     0 -  99   100-129  130-159   160-189     >/=190       VLDL   Date/Time Value Ref Range Status   10/23/2023 10:31 AM 31 0 - 40 mg/dL Final   07/17/2023 09:04 AM 31 0 - 40 mg/dL Final   03/18/2023 09:02 AM 34 0 - 40 mg/dL Final   05/09/2022 10:25 AM 37 0 - 40 mg/dL Final      Last I/O:  No intake/output data recorded.    Past Cardiology Tests (Last 3 Years):  EKG:  ECG 12 lead 10/01/2024      ECG 12 lead 08/31/2024    Echo:  Transesophageal Echo ([facility]) 10/01/2024    Ejection Fractions:  EF   Date/Time Value Ref Range Status   10/01/2024 01:39 PM 63 %      Cath:  No results found for this or any previous visit from the past 1095 days.    Stress Test:  Nuclear Stress Test 08/12/2024    Cardiac Imaging:  No results found for this or any previous visit from the past 1095 days.      Past Medical  History:  She has a past medical history of TIA (transient ischemic attack) (09/2024) and Transient cerebral ischemic attack, unspecified.    Past Surgical History:  She has a past surgical history that includes Other surgical history (06/21/2022); Other surgical history (06/21/2022); Other surgical history (06/21/2022); Other surgical history (06/21/2022); Other surgical history (06/21/2022); and Other surgical history (06/21/2022).      Social History:  She reports that she quit smoking about 25 years ago. Her smoking use included cigarettes. She has never used smokeless tobacco. She reports current alcohol use. She reports that she does not use drugs.    Family History:  No family history on file.     Allergies:  Ace inhibitors    Inpatient Medications:  Scheduled medications   Medication Dose Route Frequency    FLUoxetine  40 mg oral Daily    magnesium oxide  400 mg oral Daily    traZODone  25 mg oral Nightly     PRN medications   Medication    acetaminophen    Or    acetaminophen    Or    acetaminophen    hydrALAZINE    melatonin    polyethylene glycol     Continuous Medications   Medication Dose Last Rate     Outpatient Medications:  Current Outpatient Medications   Medication Instructions    amLODIPine (NORVASC) 10 mg, Daily    apixaban (ELIQUIS) 5 mg, oral, 2 times daily    atorvastatin (LIPITOR) 40 mg, oral, Daily    clopidogrel (PLAVIX) 75 mg, oral, Daily    FLUoxetine (PROZAC) 40 mg, Daily    fluticasone (Flonase) 50 mcg/actuation nasal spray once daily as needed.    hydrALAZINE (APRESOLINE) 25 mg, oral, 2 times daily    hydroCHLOROthiazide (HYDRODIURIL) 25 mg, oral, Daily    pantoprazole (PROTONIX) 40 mg, oral, Daily before breakfast, Do not crush, chew, or split.    potassium chloride CR 10 mEq ER tablet 10 mEq, 2 times daily    rivaroxaban (XARELTO) 20 mg, oral, Daily with evening meal, Take with food.    traZODone (DESYREL) 25 mg, Nightly       Physical Exam:  Vitals reviewed.   HENT:      Head:  Normocephalic and atraumatic.   Cardiovascular:      Rate and Rhythm: Normal rate and regular rhythm.      Heart sounds: Normal heart sounds.   Pulmonary:      Effort: Pulmonary effort is normal.      Breath sounds: Normal air entry.   Abdominal:      General: Bowel sounds are normal.      Palpations: Abdomen is soft.      Tenderness: There is no abdominal tenderness.   Musculoskeletal:         General: No deformity.   Skin:     General: Skin is warm and dry.   Neurological:      General: No focal deficit present.      Mental Status: She is alert and oriented to person, place, and time.   Psychiatric:         Mood and Affect: Mood normal.         Behavior: Behavior normal.            Assessment/Plan     Impression    1.  Paroxysmal atrial fibrillation  2.  Sinus node dysfunction with significant pauses seen on Holter monitor  3.  History of coronary artery disease with numerous elevation microinfarction, PCI of the LAD and circumflex in 2019  5.  Hypertension  6.  Hyperlipidemia  7.  History of CVA in 2019  8.  Vertigo      Peripheral IV 10/24/24 20 G Left;Anterior Forearm (Active)   Site Assessment Clean;Dry;Intact 10/24/24 2214   Dressing Type Transparent 10/24/24 2214   Line Status Flushed 10/24/24 2214   Dressing Status Clean;Dry 10/24/24 2214   Number of days: 1       Code Status:  Full Code    I spent 60 minutes in the professional and overall care of this patient.        Simon Corrales MD

## 2024-10-26 ENCOUNTER — APPOINTMENT (OUTPATIENT)
Dept: RADIOLOGY | Facility: HOSPITAL | Age: 84
DRG: 244 | End: 2024-10-26
Payer: MEDICARE

## 2024-10-26 VITALS
TEMPERATURE: 97.7 F | HEIGHT: 63 IN | RESPIRATION RATE: 18 BRPM | BODY MASS INDEX: 29.77 KG/M2 | HEART RATE: 63 BPM | DIASTOLIC BLOOD PRESSURE: 66 MMHG | WEIGHT: 167.99 LBS | OXYGEN SATURATION: 95 % | SYSTOLIC BLOOD PRESSURE: 143 MMHG

## 2024-10-26 PROCEDURE — 2500000001 HC RX 250 WO HCPCS SELF ADMINISTERED DRUGS (ALT 637 FOR MEDICARE OP)

## 2024-10-26 PROCEDURE — 71046 X-RAY EXAM CHEST 2 VIEWS: CPT

## 2024-10-26 PROCEDURE — 71046 X-RAY EXAM CHEST 2 VIEWS: CPT | Performed by: RADIOLOGY

## 2024-10-26 PROCEDURE — 99239 HOSP IP/OBS DSCHRG MGMT >30: CPT | Performed by: HOSPITALIST

## 2024-10-26 PROCEDURE — 2500000004 HC RX 250 GENERAL PHARMACY W/ HCPCS (ALT 636 FOR OP/ED)

## 2024-10-26 PROCEDURE — 2500000001 HC RX 250 WO HCPCS SELF ADMINISTERED DRUGS (ALT 637 FOR MEDICARE OP): Performed by: NURSE PRACTITIONER

## 2024-10-26 ASSESSMENT — PAIN - FUNCTIONAL ASSESSMENT: PAIN_FUNCTIONAL_ASSESSMENT: 0-10

## 2024-10-26 ASSESSMENT — PAIN SCALES - GENERAL
PAINLEVEL_OUTOF10: 0 - NO PAIN
PAINLEVEL_OUTOF10: 0 - NO PAIN

## 2024-10-26 NOTE — PROGRESS NOTES
[facility] Progress Note           Rounding Cardiologist:  Omega Luna MD    Date:  10/26/2024  Patient:  Stefany France  YOB: 1940  MRN:  [id number]   Admit Date:  10/24/2024      SUBJECTIVE:    Stefany France was seen and examined today at bedside.  Status post dual-chamber pacemaker implantation with telemetry showing AV paced rhythm.  Plan is to resume Eliquis today preop.  Chest x-ray shows leads in good position with no pneumothorax.  Device check shows normal lead and device function.  She denies any chest pain or shortness of breath.       VITALS:     Vitals:    10/25/24 1918 10/25/24 2344 10/26/24 0736 10/26/24 0844   BP: 144/81 129/60 143/66    BP Location:  Right arm     Patient Position:  Lying     Pulse: 59 60 61 63   Resp:  18 18    Temp: 36 °C (96.8 °F) 36.1 °C (97 °F) 36.5 °C (97.7 °F)    TempSrc:  Temporal     SpO2: 92% 93% 95%    Weight:       Height:           Intake/Output Summary (Last 24 hours) at 10/26/2024 1244  Last data filed at 10/26/2024 1151  Gross per 24 hour   Intake 452.08 ml   Output 10 ml   Net 442.08 ml       Wt Readings from Last 4 Encounters:   10/24/24 76.2 kg (167 lb 15.9 oz)   10/17/24 76.1 kg (167 lb 11.2 oz)   10/01/24 76.2 kg (167 lb 15.9 oz)   09/30/24 77.6 kg (171 lb)       CURRENT HOSPITAL MEDICATIONS:   amLODIPine, 10 mg, oral, Daily  atorvastatin, 40 mg, oral, Daily  chlorhexidine, , Topical, Once  clopidogrel, 75 mg, oral, Daily  FLUoxetine, 40 mg, oral, Daily  hydrALAZINE, 25 mg, oral, BID  hydroCHLOROthiazide, 25 mg, oral, Daily  magnesium oxide, 400 mg, oral, Daily  metoprolol tartrate, 25 mg, oral, BID  mupirocin, 1 Application, Topical, Once  pantoprazole, 40 mg, oral, Daily before breakfast  traZODone, 25 mg, oral, Nightly      ibutilide,         PHYSICAL EXAMINATION:   GENERAL:  Well developed, well nourished, in no acute distress.  CHEST:  Symmetric and nontender.  NEURO/PSYCH:  Alert and oriented times three with approppriate behavior and  "responses.  NECK:  Supple, no JVD, no bruit.  LUNGS:  Clear to auscultation bilaterally, normal respiratory effort.  HEART:  Rate and rhythm regular with no evident murmur, no gallop appreciated.        There are no rubs, clicks or heaves.  EXTREMITIES:  Warm with good color, no clubbing or cyanosis.  There is no edema noted.  PERIPHERAL VASCULAR:  Pulses present and equally palpable; 2+ throughout.      LAB DATA:     CBC:   Results from last 7 days   Lab Units 10/25/24  0535   WBC AUTO x10*3/uL 6.1   RBC AUTO x10*6/uL 4.57   HEMOGLOBIN g/dL 11.4*   HEMATOCRIT % 37.4   PLATELETS AUTO x10*3/uL 227        CMP:    Results from last 7 days   Lab Units 10/25/24  0535   SODIUM mmol/L 141   POTASSIUM mmol/L 3.3*   CHLORIDE mmol/L 108*   CO2 mmol/L 25   BUN mg/dL 13   CREATININE mg/dL 0.84   GLUCOSE mg/dL 118*   CALCIUM mg/dL 8.6       Cardiac Enzymes:    Lab Results   Component Value Date    TROPHS 3 10/24/2024    TROPHS <3 10/24/2024    TROPHS 4 08/31/2024       Magnesium:    Lab Results   Component Value Date    MG 1.41 (L) 10/24/2024       Lipid Profile:  No results found for: \"CHLPL\", \"TRIG\", \"HDL\", \"LDLCALC\", \"LDLDIRECT\"    TSH:  No results found for: \"TSH\"    BNP:   Lab Results   Component Value Date    BNP 81 10/24/2024        PT/INR:    Lab Results   Component Value Date    PROTIME 14.8 (H) 10/24/2024    INR 1.3 (H) 10/24/2024       HgBA1c:    Lab Results   Component Value Date    HGBA1C 6.2 (H) 09/28/2024       CBC:  Lab Results   Component Value Date    WBC 6.1 10/25/2024    WBC 8.4 10/24/2024    RBC 4.57 10/25/2024    RBC 4.80 10/24/2024    HGB 11.4 (L) 10/25/2024    HGB 12.2 10/24/2024    HCT 37.4 10/25/2024    HCT 38.9 10/24/2024    MCV 82 10/25/2024    MCV 81 10/24/2024    MCH 24.9 (L) 10/25/2024    MCH 25.4 (L) 10/24/2024    MCHC 30.5 (L) 10/25/2024    MCHC 31.4 (L) 10/24/2024    RDW 15.0 (H) 10/25/2024    RDW 14.9 (H) 10/24/2024     10/25/2024     10/24/2024       BMP:  Lab Results   Component " Value Date     10/25/2024     10/24/2024    K 3.3 (L) 10/25/2024    K 3.8 10/24/2024     (H) 10/25/2024     10/24/2024    CO2 25 10/25/2024    CO2 27 10/24/2024    BUN 13 10/25/2024    BUN 17 10/24/2024    CREATININE 0.84 10/25/2024    CREATININE 1.01 10/24/2024       Hepatic Function Panel:    Lab Results   Component Value Date    ALKPHOS 104 10/24/2024    ALT 30 10/24/2024    AST 25 10/24/2024    PROT 6.4 10/24/2024    BILITOT 0.5 10/24/2024       DIAGNOSTIC TESTING:     XR chest 1 view    Result Date: 10/25/2024  Interpreted By:  Bethany Guthrie, STUDY: XR CHEST 1 VIEW;  10/25/2024 3:09 pm   INDICATION: Signs/Symptoms:post implant.   COMPARISON: 10/24/2024   ACCESSION NUMBER(S): [id number]   ORDERING CLINICIAN: JOSE STARR   FINDINGS: The heart is normal in size. There is a dual lead transvenous pacemaker.   There are atherosclerotic changes of the aorta.   There is no consolidation or pleural fluid. There is no obvious pneumothorax.   COMPARISON OF FINDING: The pacemaker was placed in the interval.       Interval pacemaker placement. No obvious pneumothorax.   MACRO: none   Signed by: Bethany Guthrie 10/25/2024 3:24 PM Dictation workstation:   [id number]    ECG 12 lead STAT    Result Date: 10/25/2024  Atrial-paced rhythm Nonspecific ST abnormality Abnormal ECG When compared with ECG of 24-OCT-2024 21:27, (unconfirmed) Electronic atrial pacemaker has replaced Sinus rhythm T wave inversion no longer evident in Lateral leads    Electrophysiology procedure    Result Date: 10/25/2024  PERMANENT DDDR PACEMAKER IMPLANTATION, INTRAOPERATIVE CHEMICAL CARDIOVERSION PATIENT NAME: Stefany France PATIENT MRN: [id number] SERVICE DATE:  10/25/2024 SERVICE TIME: 2:20 PM Indication for Procedure: Paroxysmal atrial fibrillation, sinus node dysfunction Procedure: Implantation of permanent DDDR pacemaker generator, with insertion of atrial and ventricular leads via left subclavian vein, and intraoperative chemical  cardioversion Surgeon: HEENA Moreno MD Complications: None Estimated Blood Loss: 20 ml Anesthesia: Local; IV Versed/Fentanyl Clinical History: The patient is an 84-year-old white female with a history of coronary artery disease, status post LAD and LCx interventions in 2019, who also has a history of hypertension, hyperlipidemia and a prior small stroke.  She has had paroxysmal atrial fibrillation well-documented recently, and also had some near syncopal spells.  By Holter monitoring in October 2024, she had paroxysmal atrial fibrillation with offset pauses up to 9.7 seconds in duration, classic for tachycardia-bradycardia syndrome.  She was electively admitted to the hospital on 10/24/2024 because of her need for a permanent pacemaker.  She was initially in sinus rhythm but went into atrial fibrillation with a rapid ventricular response, and we have avoided beta-blockers because of her known propensity to bradycardia.  Her Eliquis has been held for 24 hours.  Permanent pacing was recommended to protect the patient against symptomatic bradycardia and to allow more aggressive AV reggie blocker therapy and possible antiarrhythmic therapy. Procedure Details: The patient was brought to the electrophysiology laboratory in the fasting state.  Written consent for the procedure was obtained.  The patient's rhythm was atrial fibrillation with a ventricular response in the 120s.  Intravenous antibiotics were administered.  The left subclavian area was prepped and draped in the routine sterile fashion and local anesthesia given.  Intravenous sedation was also administered during the procedure.  A 5 cm transverse incision was made in the skin along the previous incisional scar line.  Using electrocautery to control bleeding, a pocket was dissected.  A tiny left cephalic vein was isolated and tied off distally with #0 Ethibond.  The vessel was cannulated, and a guidewire was passed into this vessel and out the axillary vein  but could never be directed into the subclavian vein, even using a Glidewire.  This approach was abandoned.  The vessel was tied off proximally with #0 Ethibond.  The left subclavian vein was then cannulated on the first attempt and guidewire passed into the central venous circulation.  Using a double wire technique, this was upsized to 2 guidewires.  A #7 Amharic sheath was placed over one of the 2 guidewires.  The guidewire and cannula were removed and the sheath was flushed.  A permanent active-fixation ventricular lead was passed through the sheath into the right heart, after which the sheath was peeled away.  The lead was positioned to the right ventricular inferior wall and screwed into place.  Ventricular sensing and pacing parameters were good.  This lead was affixed to the subcutaneous fascia using 2 separate #0 Ethibond sutures over a sleeve.  A permanent pacemaker was then attached to the ventricular lead, and the setscrew tightened, to provide backup pacing in case the patient spontaneously converted.  The patient was given 5 mg of intravenous metoprolol to slow her ventricular response at that point.  A #7 Amharic sheath was placed over the other guidewire in the subclavian vein.  The guidewire and cannula were removed and the sheath was flushed.  A permanent active-fixation atrial lead was passed through the sheath into the right heart, after which the sheath was peeled away.  The lead was positioned into the right atrial appendage area and screwed into place.  Atrial sensing of rapid atrial flutter was good, with a normal lead impedance.  This lead was affixed to the subcutaneous fascia using 2 separate #0 Ethibond sutures over a sleeve.  Final lead parameters were checked.  The atrial lead pin was attached to the atrial port of the permanent pacemaker generator and the setscrew tightened. The pocket was irrigated with antibiotic solution.  The generator was placed into the pocket with the leads coiled  beneath it, and secured to the deep fascia with a #0 Ethibond suture at the superomedial edge of the generator.  The incision was closed with running #3-0 Vicryl in the deep layer, which was run back through the subcutaneous tissue.  The skin was closed in a subcuticular fashion using running #4-0 Vicryl.  Good hemostasis was observed, and an Aquacel dressing was applied. During the closure phase of the procedure, the patient was given 1 mg of intravenous ibutilide over 10 minutes.  Just as the closure was complete, the patient was noted to convert spontaneously to sinus rhythm.  Her rhythm thereafter was atrial pacing with normal capture and intact VV conduction.  With the pacemaker inhibited, the patient actually had a junctional rhythm at that point.  The atrial pacing threshold through the device was acceptable at 1.5 V / 0.4 ms, and the sinus P waves were over 2 mV. Final fluoroscopic images were obtained, demonstrating stable lead tip positions and no retained sponges in the pocket.  The device was programmed to final parameters.  The patient tolerated the procedure well.  She left the laboratory in stable condition, in an atrial paced rhythm in the 60s, with intact AV conduction.  A chest x-ray was to be performed. Permanent pacemaker generator information: : Medtronic Model Number: [id number] (Melodie XT DR MRI) Serial Number: [id number] Pacing Mode: AAIR (to DDDR) Lower Rate: 60 bpm Upper Rate: 120 bpm AV Delay (Sensed): 150 ms AV Delay (Paced): 180 ms Atrial Output: 3.5 V Atrial Pulse Width: 0.4 ms Atrial Sensitivity: 0.15 mV Atrial Polarity: Bipolar (sensing and pacing) Ventricular Output: 3.5 V Ventricular Pulse Width: 0.4 ms Ventricular Sensitivity: 0.9 mV Ventricular Polarity: Bipolar (sensing and pacing) Sensor Settings: Nominal Permanent atrial lead information: : Medtronic Model Number: 5076-45 cm Serial Number: [id number] Fibrillation wave Amplitude: 1.5 mV Sinus P wave Amplitude:  2.7 mV Stimulation Threshold: 1.5 V @ 0.4 ms Lead Impedance: 684 ohms Permanent ventricular lead information: : Medtronic Model Number: 5076-52 cm Serial Number: [id number] R wave Amplitude: 10.7 mV Stimulation Threshold: 0.9 V @ 0.4 ms Lead Impedance: 893 ohms Summary: In this procedure, the patient underwent successful DDDR pacemaker placement for protection against symptomatic bradycardia, and to allow more aggressive AV reggie blockade and potential antiarrhythmic therapy.  She may resume beta-blocker therapy today, and may resume her Eliquis anticoagulation on 10/26/2024.  Given her spontaneous cardioversion today after intravenous ibutilide, she would be a good candidate for a class III antiarrhythmic drug in the future, such as sotalol, dofetilide, or amiodarone.  She will follow-up with Dr. Simon Corrales for consideration of these medications, depending upon her atrial fibrillation burden by pacemaker interrogations. SIGNATURE: Joss Moreno MD OFFICE: [phone]     ECG 12 lead    Result Date: 10/25/2024  Normal sinus rhythm Left ventricular hypertrophy with repolarization abnormality Abnormal ECG When compared with ECG of 24-OCT-2024 19:59, (unconfirmed) ST now depressed in Lateral leads Nonspecific T wave abnormality no longer evident in Inferior leads T wave inversion now evident in Lateral leads    ECG 12 lead    Result Date: 10/25/2024  Normal sinus rhythm Cannot rule out Anterior infarct , age undetermined Abnormal ECG When compared with ECG of 01-OCT-2024 11:45, Nonspecific T wave abnormality, worse in Inferior leads    XR chest 1 view    Result Date: 10/24/2024  STUDY: Chest Radiograph;  10/24/2024 7:15 PM. INDICATION: Evaluate / plan for pace maker. COMPARISON: CXR 8/6/2023 ACCESSION NUMBER(S): [id number] ORDERING CLINICIAN: DOUGLAS COOPER TECHNIQUE:  Frontal chest was obtained at 19:15 hours. FINDINGS: CARDIOMEDIASTINAL SILHOUETTE: Cardiomediastinal silhouette is normal in  size and configuration.  LUNGS: Lungs are clear.  ABDOMEN: No remarkable upper abdominal findings.  BONES: No acute osseous changes.    No acute process. Signed by Winston Lancaster MD       RADIOLOGY:     XR chest 2 views   Final Result   No new active disease in the chest identified.        MACRO:   None        Signed by: Ezekiel Zaragoza 10/26/2024 9:53 AM   Dictation workstation:   [id number]      Cardiac device check - Inpatient         XR chest 1 view   Final Result   Interval pacemaker placement. No obvious pneumothorax.        MACRO:   none        Signed by: Bethany Guthrie 10/25/2024 3:24 PM   Dictation workstation:   [id number]      Electrophysiology procedure   Final Result      XR chest 1 view   Final Result   No acute process.   Signed by Winston Lancaster MD      Cardiac Device Check - In Clinic    (Results Pending)       PROBLEM LIST     Patient Active Problem List   Diagnosis    Benign essential hypertension    Benign paroxysmal positional vertigo    Bilateral carotid artery stenosis    Bilateral impacted cerumen    Bilateral sensorineural hearing loss    Bradycardia    CAD S/P percutaneous coronary angioplasty    Cervicalgia    Chronic rhinitis    CVA (cerebral vascular accident) (Multi)    Dyslipidemia    Ear pressure    Gastroesophageal reflux disease    Hyperlipidemia    Hypokalemia    Lightheaded    LVH (left ventricular hypertrophy)    Nonspecific abnormal results of liver function study    Obesity    Postural hypotension    Presbycusis, bilateral    Tinnitus, bilateral    Vertigo    BMI 30.0-30.9,adult    Former smoker    Embolic stroke (Multi)    BMI 29.0-29.9,adult    Paroxysmal atrial fibrillation (Multi)    Dizziness       ASSESSMENT & PLAN:   1.  Sick sinus syndrome, status post pacemaker implantation as noted above with.  Device check within normal limits as well as a chest x-ray with no pneumothorax.  Okay to discharge patient from cardiology perspective to resume her Eliquis and other cardiac  medications.  Follow-up as been scheduled with EP and General Cardiology will sign off.        Please do not hesitate to call with questions.  Electronically signed by Omega Luna MD, on 10/26/2024 at 12:44 PM

## 2024-10-26 NOTE — PROGRESS NOTES
Stefany France is a 84 y.o. female on day 1 of admission presenting with Dizziness.      Subjective   Seen and examined   Clinically stable   No chest pain   No SOB         Objective     Last Recorded Vitals  /66   Pulse 63   Temp 36.5 °C (97.7 °F)   Resp 18   Wt 76.2 kg (167 lb 15.9 oz)   SpO2 95%   Intake/Output last 3 Shifts:    Intake/Output Summary (Last 24 hours) at 10/26/2024 1031  Last data filed at 10/25/2024 1704  Gross per 24 hour   Intake 250 ml   Output 10 ml   Net 240 ml       Admission Weight  Weight: 76.2 kg (168 lb) (10/24/24 1816)    Daily Weight  10/24/24 : 76.2 kg (167 lb 15.9 oz)    Image Results  XR chest 2 views  Narrative: Interpreted By:  Ezekiel Zaragoza,   STUDY:  XR CHEST 2 VIEWS;  10/26/2024 8:01 am      INDICATION:  Signs/Symptoms:post implant.      COMPARISON:  10/25/2024      ACCESSION NUMBER(S):  [id number]      ORDERING CLINICIAN:  JOSE STARR      FINDINGS:  Left upper chest pacemaker with leads terminating in the right atrium  and ventricle. The lungs appear clear without apparent pleural  effusion. Cardiomegaly. Aortic atherosclerosis. No pulmonary vascular  congestion. No apparent pneumothorax.      Impression: No new active disease in the chest identified.      MACRO:  None      Signed by: Ezekiel Zaragoza 10/26/2024 9:53 AM  Dictation workstation:   [id number]      Physical Exam  Constitutional:       Appearance: Normal appearance.   HENT:      Head: Normocephalic.      Nose: Nose normal.   Eyes:      Extraocular Movements: Extraocular movements intact.      Pupils: Pupils are equal, round, and reactive to light.   Cardiovascular:      Rate and Rhythm: Normal rate and regular rhythm.   Pulmonary:      Effort: Pulmonary effort is normal.      Breath sounds: Normal breath sounds.   Abdominal:      General: Abdomen is flat.      Palpations: Abdomen is soft.   Musculoskeletal:      Cervical back: Normal range of motion and neck supple.   Skin:     General: Skin is warm.    Neurological:      General: No focal deficit present.      Mental Status: She is alert and oriented to person, place, and time.   Psychiatric:         Mood and Affect: Mood normal.         Behavior: Behavior normal.                Assessment/Plan        Assessment & Plan  Dizziness    Bradycardia    Paroxysmal atrial fibrillation (Multi)      1.  Paroxysmal atrial fibrillation  2.  Sinus node dysfunction with significant pauses seen on Holter monitor    Seen and examined   Clinically stable   No complains   No dizziness    S/p PPM   Lopressor 25 twice  Eliquis PO BID     DC home   Follow-up with EP as outpatient    3.  History of coronary artery disease with numerous elevation microinfarction, PCI of the LAD   and circumflex in 2019  Plavix daily  Lipitor day    5.  Hypertension  Norvasc 10 daily  Hydrochlorothiazide    6.  Hyperlipidemia  Lipitor daily    7.  History of CVA in 2019  Plavix and Lipitor    8. Anxiety Prozac     DC home          Madelyn Palomino MD

## 2024-10-26 NOTE — DISCHARGE SUMMARY
Discharge Diagnosis  Dizziness    Issues Requiring Follow-Up  Follow up with EP     Discharge Meds     Medication List      START taking these medications     metoprolol tartrate 25 mg tablet; Commonly known as: Lopressor; Take 1   tablet (25 mg) by mouth 2 times a day.     CONTINUE taking these medications     amLODIPine 10 mg tablet; Commonly known as: Norvasc   apixaban 5 mg tablet; Commonly known as: Eliquis; Take 1 tablet (5 mg)   by mouth 2 times a day. Hold and resume with evening dose on 10/26/24   atorvastatin 40 mg tablet; Commonly known as: Lipitor; Take 1 tablet (40   mg) by mouth once daily.   clopidogrel 75 mg tablet; Commonly known as: Plavix; Take 1 tablet (75   mg) by mouth once daily.   FLUoxetine 40 mg capsule; Commonly known as: PROzac   fluticasone 50 mcg/actuation nasal spray; Commonly known as: Flonase   hydrALAZINE 25 mg tablet; Commonly known as: Apresoline; Take 1 tablet   (25 mg) by mouth 2 times a day.   hydroCHLOROthiazide 25 mg tablet; Commonly known as: HYDRODiuril; Take 1   tablet (25 mg) by mouth once daily.   pantoprazole 40 mg EC tablet; Commonly known as: ProtoNix; Take 1 tablet   (40 mg) by mouth once daily in the morning. Take before meals. Do not   crush, chew, or split.   potassium chloride CR 10 mEq ER tablet; Commonly known as: Klor-Con   rivaroxaban 20 mg tablet; Commonly known as: Xarelto; Take 1 tablet (20   mg) by mouth once daily in the evening. Take with meals. Take with food.   traZODone 50 mg tablet; Commonly known as: Desyrel       Test Results Pending At Discharge  Pending Labs       No current pending labs.            Hospital Course     Stefany France is a 84 y.o. female on day 1 of admission presenting with Dizziness.           Subjective  Seen and examined   Clinically stable   No chest pain   No SOB                 Objective  Last Recorded Vitals  /66   Pulse 63   Temp 36.5 °C (97.7 °F)   Resp 18   Wt 76.2 kg (167 lb 15.9 oz)   SpO2 95%    Intake/Output last 3 Shifts:     Intake/Output Summary (Last 24 hours) at 10/26/2024 1031  Last data filed at 10/25/2024 1704      Gross per 24 hour   Intake 250 ml   Output 10 ml   Net 240 ml         Admission Weight  Weight: 76.2 kg (168 lb) (10/24/24 1816)     Daily Weight  10/24/24 : 76.2 kg (167 lb 15.9 oz)     Image Results  XR chest 2 views  Narrative: Interpreted By:  Ezekiel Zaragoza,   STUDY:  XR CHEST 2 VIEWS;  10/26/2024 8:01 am      INDICATION:  Signs/Symptoms:post implant.      COMPARISON:  10/25/2024      ACCESSION NUMBER(S):  [id number]      ORDERING CLINICIAN:  JOSE STARR      FINDINGS:  Left upper chest pacemaker with leads terminating in the right atrium  and ventricle. The lungs appear clear without apparent pleural  effusion. Cardiomegaly. Aortic atherosclerosis. No pulmonary vascular  congestion. No apparent pneumothorax.      Impression: No new active disease in the chest identified.      MACRO:  None      Signed by: Ezekiel Zaragoza 10/26/2024 9:53 AM  Dictation workstation:   [id number]        Physical Exam  Constitutional:       Appearance: Normal appearance.   HENT:      Head: Normocephalic.      Nose: Nose normal.   Eyes:      Extraocular Movements: Extraocular movements intact.      Pupils: Pupils are equal, round, and reactive to light.   Cardiovascular:      Rate and Rhythm: Normal rate and regular rhythm.   Pulmonary:      Effort: Pulmonary effort is normal.      Breath sounds: Normal breath sounds.   Abdominal:      General: Abdomen is flat.      Palpations: Abdomen is soft.   Musculoskeletal:      Cervical back: Normal range of motion and neck supple.   Skin:     General: Skin is warm.   Neurological:      General: No focal deficit present.      Mental Status: She is alert and oriented to person, place, and time.   Psychiatric:         Mood and Affect: Mood normal.         Behavior: Behavior normal.                           Assessment/Plan        Assessment & Plan  Dizziness      Bradycardia     Paroxysmal atrial fibrillation (Multi)        1.  Paroxysmal atrial fibrillation  2.  Sinus node dysfunction with significant pauses seen on Holter monitor     Seen and examined   Clinically stable   No complains   No dizziness     S/p PPM   Lopressor 25 twice  Eliquis PO BID      DC home   Follow-up with EP as outpatient     3.  History of coronary artery disease with numerous elevation microinfarction, PCI of the LAD   and circumflex in 2019  Plavix daily  Lipitor day     5.  Hypertension  Norvasc 10 daily  Hydrochlorothiazide     6.  Hyperlipidemia  Lipitor daily     7.  History of CVA in 2019  Plavix and Lipitor     8. Anxiety Prozac  DC home         Dc time >30 min          Pertinent Physical Exam At Time of Discharge  Physical Exam    Outpatient Follow-Up  Future Appointments   Date Time Provider Department Center   11/1/2024  9:00 AM OANH ROMEO CARDIOLOGY RN 1 [id number] [facility]   11/13/2024  1:45 PM Simon Corrales MD [id number] [facility]   1/29/2025  2:20 PM [facility] 1 [id number] [facility]   1/29/2025  3:00 PM Simon Corrales MD [id number] [facility]   2/17/2025 12:45 PM Juwan Naqvi DO [id number] [facility]   3/24/2025  1:30 PM AGUSTINA Dumont [id number] [facility]   6/23/2025 11:45 AM Jimmy Rivera MD [id number] [facility]   8/6/2025  2:15 PM Juwan Naqvi DO [id number] [facility]         Madelyn Palomino MD

## 2024-10-26 NOTE — PROGRESS NOTES
Spoke with patient and also daughter at bedside. Patient declines needing any assistance at discharge, states family is close by and will be assisting. Plan is home, no needs. CT team to follow.

## 2024-10-26 NOTE — CARE PLAN
The patient's goals for the shift include pt will remain safe    The clinical goals for the shift include pt will remain hemodynamically stable throughout shift

## 2024-10-27 LAB
ATRIAL RATE: 60 BPM
P OFFSET: 210 MS
P ONSET: 147 MS
PR INTERVAL: 172 MS
Q ONSET: 228 MS
QRS COUNT: 10 BEATS
QRS DURATION: 66 MS
QT INTERVAL: 466 MS
QTC CALCULATION(BAZETT): 466 MS
QTC FREDERICIA: 466 MS
R AXIS: 18 DEGREES
T AXIS: 58 DEGREES
T OFFSET: 461 MS
VENTRICULAR RATE: 60 BPM

## 2024-10-28 ENCOUNTER — PATIENT OUTREACH (OUTPATIENT)
Dept: CARDIOLOGY | Facility: CLINIC | Age: 84
End: 2024-10-28
Payer: MEDICARE

## 2024-11-01 ENCOUNTER — APPOINTMENT (OUTPATIENT)
Dept: CARDIOLOGY | Facility: CLINIC | Age: 84
End: 2024-11-01
Payer: MEDICARE

## 2024-11-01 DIAGNOSIS — Z95.0 PACEMAKER: ICD-10-CM

## 2024-11-01 DIAGNOSIS — I48.0 PAROXYSMAL ATRIAL FIBRILLATION (MULTI): ICD-10-CM

## 2024-11-01 DIAGNOSIS — R00.1 BRADYCARDIA: ICD-10-CM

## 2024-11-05 ENCOUNTER — APPOINTMENT (OUTPATIENT)
Dept: CARDIOLOGY | Facility: CLINIC | Age: 84
End: 2024-11-05
Payer: MEDICARE

## 2024-11-06 LAB
ATRIAL RATE: 82 BPM
P AXIS: 66 DEGREES
P OFFSET: 181 MS
P ONSET: 155 MS
PR INTERVAL: 142 MS
Q ONSET: 226 MS
QRS COUNT: 13 BEATS
QRS DURATION: 62 MS
QT INTERVAL: 378 MS
QTC CALCULATION(BAZETT): 441 MS
QTC FREDERICIA: 419 MS
R AXIS: 6 DEGREES
T AXIS: 101 DEGREES
T OFFSET: 415 MS
VENTRICULAR RATE: 82 BPM

## 2024-11-09 LAB
ATRIAL RATE: 83 BPM
P AXIS: 70 DEGREES
P OFFSET: 178 MS
P ONSET: 152 MS
PR INTERVAL: 134 MS
Q ONSET: 219 MS
QRS COUNT: 14 BEATS
QRS DURATION: 72 MS
QT INTERVAL: 404 MS
QTC CALCULATION(BAZETT): 474 MS
QTC FREDERICIA: 450 MS
R AXIS: 20 DEGREES
T AXIS: 40 DEGREES
T OFFSET: 421 MS
VENTRICULAR RATE: 83 BPM

## 2024-11-12 ENCOUNTER — PATIENT OUTREACH (OUTPATIENT)
Dept: CARDIOLOGY | Facility: CLINIC | Age: 84
End: 2024-11-12
Payer: MEDICARE

## 2024-11-13 ENCOUNTER — APPOINTMENT (OUTPATIENT)
Dept: CARDIOLOGY | Facility: CLINIC | Age: 84
End: 2024-11-13
Payer: MEDICARE

## 2024-11-13 VITALS
BODY MASS INDEX: 29.41 KG/M2 | HEART RATE: 68 BPM | HEIGHT: 63 IN | SYSTOLIC BLOOD PRESSURE: 120 MMHG | WEIGHT: 166 LBS | DIASTOLIC BLOOD PRESSURE: 58 MMHG

## 2024-11-13 DIAGNOSIS — R00.1 BRADYCARDIA: ICD-10-CM

## 2024-11-13 DIAGNOSIS — I63.9 CEREBROVASCULAR ACCIDENT (CVA), UNSPECIFIED MECHANISM (MULTI): ICD-10-CM

## 2024-11-13 DIAGNOSIS — Z95.0 PACEMAKER: Primary | ICD-10-CM

## 2024-11-13 DIAGNOSIS — I48.0 PAROXYSMAL ATRIAL FIBRILLATION (MULTI): ICD-10-CM

## 2024-11-13 DIAGNOSIS — Z87.891 FORMER SMOKER: ICD-10-CM

## 2024-11-13 PROCEDURE — 1111F DSCHRG MED/CURRENT MED MERGE: CPT | Performed by: INTERNAL MEDICINE

## 2024-11-13 PROCEDURE — 99214 OFFICE O/P EST MOD 30 MIN: CPT | Performed by: INTERNAL MEDICINE

## 2024-11-13 PROCEDURE — 93000 ELECTROCARDIOGRAM COMPLETE: CPT | Performed by: INTERNAL MEDICINE

## 2024-11-13 PROCEDURE — 1159F MED LIST DOCD IN RCRD: CPT | Performed by: INTERNAL MEDICINE

## 2024-11-13 PROCEDURE — 3074F SYST BP LT 130 MM HG: CPT | Performed by: INTERNAL MEDICINE

## 2024-11-13 PROCEDURE — 3078F DIAST BP <80 MM HG: CPT | Performed by: INTERNAL MEDICINE

## 2024-11-13 PROCEDURE — 1036F TOBACCO NON-USER: CPT | Performed by: INTERNAL MEDICINE

## 2024-11-13 ASSESSMENT — ENCOUNTER SYMPTOMS
DIZZINESS: 1
PALPITATIONS: 0
DYSPNEA ON EXERTION: 0

## 2024-11-13 NOTE — PATIENT INSTRUCTIONS
Continue same medications/treatment.  Patient educated on proper medication use.  Patient educated on risk factor modification.  Please bring any lab results from other providers/physicians to your next appointment.    Please bring all medicines, vitamins, and herbal supplements with you when you come to the office.    Prescriptions will not be filled unless you are compliant with your follow up appointments or have a follow up appointment scheduled as per instruction of your physician. Refills should be requested at the time of your visit.    STOP hydrochlorothiazide for 2 weeks. Keep a log of your blood pressures for 2 weeks and drop a log back off to our office.   Follow up with Dr. Almanzar in 6 months with device check  Continue remote checks at 3 and 9 months    Gina REGALADO RN, AM SCRIBING FOR, AND IN THE PRESENCE OF DR. JUANCHO ALMANZAR MD

## 2024-11-13 NOTE — PROGRESS NOTES
CARDIOLOGY OFFICE VISIT      CHIEF COMPLAINT  Chief Complaint   Patient presents with    Hospital Follow-up     Patient is present for hospital follow up.        HISTORY OF PRESENT ILLNESS  HPI   84 y.o. female presenting with near syncope at [facility] October 25, 2024..     She has a past medical history of coronary artery disease, status post non-ST elevation myocardial infarction, status post drug-eluting stents LAD and left circumflex June 24, 2019, cerebrovascular accident, hypertension, left ventricular hypertrophy, hyperlipidemia patient was recently seen by cardiology service who recommended a Zio patch due to patient was complaining of frequent near syncopal episodes.     Holter monitor October 2024 shows episodes of sinus rhythm with atrial fibrillation with rapid ventricular response with pauses up to 9.7 seconds of duration.  Patient was called for evaluation.     In the emergency department EKG was consistent with sinus rhythm at a rate of 82 bpm QRS duration 62 ms QT corrected 441 ms.  Sodium 139 potassium 3.8 chloride 104 BUN 17 creatinine 1.01 ALT 30 AST 25 magnesium 1.41 BNP 81 troponin 5.3, subsequent troponin 3.  WBC 8.4 hemoglobin 12.2 hematocrit 38.9 platelet count 277     Holter monitor October 4, 2024  This is a Holter monitor for 14 days.     The underlying rhythm was sinus rhythm but there were episodes of atrial fibrillation during this study.  The burden of atrial fibrillation was 17% of the time.  The rates during atrial fibrillation were between  bpm.  Looking at the rhythm strips, there were episodes of pauses up to 9.7 seconds of duration with episodes of junctional rhythm.  Pauses during daytime hours when sleep time.     The minimum heart rate was 36 bpm, the maximal heart rate was 118 bpm with average heart rate of 59 bpm.     There were occasional isolated PVCs but no evidence of sustained ventricular arrhythmias     No significant symptoms were seen during this  study     Conclusion     Underlying rhythm sinus rhythm.     Episodes of atrial fibrillation rapid ventricular response seen during this study with burden of atrial fibrillation approximately 17% of the time.     Significant pauses were seen during study (17 episodes) up to 9.7 seconds of duration.  Clinical correlation is to be made     Holter monitor September 20, 2024  Procedure: 14-day event monitor     Date of study: September 20, 2024     Indications: Cerebrovascular accident     Report:  After removal of artifact Patient with 13 days, 23 hours of analysis time.  Baseline rhythm was normal sinus rhythm average rate 55 bpm, minimum rate sinus bradycardia 41 bpm at 5:26 AM, maximum rate 98 bpm.  There were 42 episodes of paroxysmal supraventricular tachycardia, longest duration 16 beats, maximum rate 179 bpm.  There are episodes of atrial fibrillation atrial flutter (5% burden) with heart rates ranging  bpm (average 95 bpm).  The longest episode of atrial fibrillation was 13 hours, 16 minutes.  There were 2 pauses longest duration 6 seconds.  Episodes of junctional rhythm were present.  There were rare premature atrial contractions, atrial couplets, atrial triplets.  Rare premature ventricular contractions, ventricular trigeminy.     Conclusions:  Paroxysmal atrial fibrillation/atrial flutter (5% burden) with ventricular rate  bpm.  Sinus pauses with the longest duration 6 seconds.  Paroxysmal supraventricular tachycardia, maximum rate 179 bpm  Baseline rhythm sinus bradycardia average rate 55 bpm, minimum rate 41 bpm.     Please excuse any errors in grammar or translation related to this dictation.  Voice recognition software was utilized to prepare this document.     Transesophageal echocardiogram October 1, 2024     CONCLUSIONS:   1. The left ventricular systolic function is normal, with a visually estimated ejection fraction of 60-65%.   2. Left ventricular diastolic filling was indeterminate.    3. There is normal right ventricular global systolic function.   4. Mild aortic valve regurgitation.   5. No left atrial thrombus.   6. There is plaque visualized in the descending aorta.     Stress test 2024  IMPRESSION:  Normal Lexiscan Myoview cardiac perfusion stress test.  No evidence of ischemia or myocardial infarction by perfusion imaging.  Normal left ventricular systolic function, ejection fraction 73%.  Noninvasive risk stratification: Low risk.  Comparison study 2021 was negative for ischemia or  infarction. Calculated LV ejection fraction 83%.    Patient underwent implantation of a dual-chamber pacemaker in 2024 with no complications.    Patient states that she continues feeling dizzy especially with postural changes but not near syncope that she used to feel before.    EKG performed today shows atrial paced rhythm at rate of 68 bpm QRS ration 72 ms QT corrected 431 ms.  Rhythm strip shows the same pattern.    Patient has multiple questions today about antihypertensive medications.           Past Medical History  Past Medical History:   Diagnosis Date    TIA (transient ischemic attack) 2024    Transient cerebral ischemic attack, unspecified     Mini stroke       Social History  Social History     Tobacco Use    Smoking status: Former     Current packs/day: 0.00     Types: Cigarettes     Quit date:      Years since quittin.8    Smokeless tobacco: Never   Vaping Use    Vaping status: Never Used   Substance Use Topics    Alcohol use: Yes     Comment: occasional    Drug use: Never       Family History   No family history on file.     Allergies:  Allergies   Allergen Reactions    Ace Inhibitors Cough        Outpatient Medications:  Current Outpatient Medications   Medication Instructions    amLODIPine (NORVASC) 10 mg, Daily    apixaban (ELIQUIS) 5 mg, oral, 2 times daily, Hold and resume with evening dose on 10/26/24    atorvastatin (LIPITOR) 40 mg, oral, Daily     clopidogrel (PLAVIX) 75 mg, oral, Daily    FLUoxetine (PROZAC) 40 mg, Daily    fluticasone (Flonase) 50 mcg/actuation nasal spray once daily as needed.    hydrALAZINE (APRESOLINE) 25 mg, oral, 2 times daily    metoprolol tartrate (LOPRESSOR) 25 mg, oral, 2 times daily    pantoprazole (PROTONIX) 40 mg, oral, Daily before breakfast, Do not crush, chew, or split.    potassium chloride CR 10 mEq ER tablet 10 mEq, 2 times daily    rivaroxaban (XARELTO) 20 mg, oral, Daily with evening meal, Take with food.    traZODone (DESYREL) 25 mg, Nightly          REVIEW OF SYSTEMS  Review of Systems   Cardiovascular:  Negative for chest pain, dyspnea on exertion and palpitations.   Neurological:  Positive for dizziness.   All other systems reviewed and are negative.        VITALS  Vitals:    11/13/24 1431   BP: 120/58   Pulse: 68       PHYSICAL EXAM  Constitutional:       General: Awake.      Appearance: Normal and healthy appearance. Well-developed and not in distress.   Neck:      Vascular: No JVR. JVD normal.   Pulmonary:      Effort: Pulmonary effort is normal.      Breath sounds: Normal breath sounds. No wheezing. No rhonchi. No rales.   Chest:      Chest wall: Not tender to palpatation.      Comments: Left sided device pocket- healed and well approximated. No swelling or hematoma      Cardiovascular:      PMI at left midclavicular line. Normal rate. Regular rhythm. Normal S1. Normal S2.       Murmurs: There is no murmur.      No gallop.  No click. No rub.   Pulses:     Intact distal pulses.   Edema:     Peripheral edema absent.   Abdominal:      Tenderness: There is no abdominal tenderness.   Musculoskeletal: Normal range of motion.         General: No tenderness. Skin:     General: Skin is warm and dry.   Neurological:      General: No focal deficit present.      Mental Status: Alert and oriented to person, place and time.           ASSESSMENT AND PLAN  Impression     1.  Paroxysmal atrial fibrillation  2.  Sinus node  dysfunction with significant pauses seen on Holter monitor  3.  History of coronary artery disease with multiple percutaneous coronary interventions, PCI of the LAD and circumflex in 2019  5.  Hypertension  6.  Hyperlipidemia  7.  History of CVA in 2019  8.  Vertigo  9.  Status post dual-chamber pacemaker implanted for sinus node dysfunction and pauses seen on Holter monitor in October 2024    Plan recommendations    From the electrophysiologist on point she is doing well.  No more syncopal episodes.  Continue with pacemaker checks per protocol.    Follow my office in 3 months or sooner needed.    Regarding antihypertensive medications, patient will discontinue hydrochlorothiazide for the next few weeks.  She will check her blood pressure twice a day.  In case blood pressures are still elevated, we may have to resume this medication.    Hydration discussed with patient.    Follow device clinic as scheduled.    Risk factor modification and lifestyle modification discussed with patient. Diet , exercise and hydration discussed with patient.    I have personally review with patient during this office visit, laboratory data, echocardiogram results, stress test results, Holter-event monitor results prior and after the last electrophysiology visit. All questions has been answered.    Please excuse any errors in grammar or translation related to this dictation.  Voice recognition software was utilized to prepare this document.

## 2024-11-20 ENCOUNTER — APPOINTMENT (OUTPATIENT)
Dept: CARDIOLOGY | Facility: CLINIC | Age: 84
End: 2024-11-20
Payer: MEDICARE

## 2024-12-06 ENCOUNTER — HOSPITAL ENCOUNTER (OUTPATIENT)
Dept: CARDIOLOGY | Facility: HOSPITAL | Age: 84
Discharge: HOME | End: 2024-12-06
Payer: MEDICARE

## 2024-12-06 DIAGNOSIS — Z95.0 PACEMAKER: ICD-10-CM

## 2024-12-11 ENCOUNTER — PATIENT OUTREACH (OUTPATIENT)
Dept: CARDIOLOGY | Facility: CLINIC | Age: 84
End: 2024-12-11
Payer: MEDICARE

## 2025-01-09 ENCOUNTER — PATIENT OUTREACH (OUTPATIENT)
Dept: CARDIOLOGY | Facility: CLINIC | Age: 85
End: 2025-01-09
Payer: MEDICARE

## 2025-01-27 ENCOUNTER — TELEPHONE (OUTPATIENT)
Dept: CARDIOLOGY | Facility: CLINIC | Age: 85
End: 2025-01-27
Payer: MEDICARE

## 2025-01-27 NOTE — TELEPHONE ENCOUNTER
Patient called initially to request refill on Hydrochlorothiazide.  However, it was not on her med list.  Per OV with Dr. Simon Corrales on 11/13/24, he had stopped it and patient was to check her B/P twice  a day.  In case her B/P's are still elevated, may have to resume the Hydrochlorothiazide.  The patient said that her B/P's were fine at home, so she never really stopped taking the Hydrochlorothiazide .  She is scheduled to see Dr. Corrales on Weds. 1/29 and will discuss further whether she is to continue or stop the medication.  She will then ask for refill if she is to remain on it.  Routed to Gina

## 2025-01-29 ENCOUNTER — DOCUMENTATION (OUTPATIENT)
Dept: PRIMARY CARE | Facility: CLINIC | Age: 85
End: 2025-01-29

## 2025-01-29 ENCOUNTER — APPOINTMENT (OUTPATIENT)
Dept: CARDIOLOGY | Facility: CLINIC | Age: 85
End: 2025-01-29
Payer: MEDICARE

## 2025-01-29 ENCOUNTER — HOSPITAL ENCOUNTER (OUTPATIENT)
Dept: RADIOLOGY | Facility: HOSPITAL | Age: 85
Discharge: HOME | End: 2025-01-29
Payer: MEDICARE

## 2025-01-29 ENCOUNTER — HOSPITAL ENCOUNTER (OUTPATIENT)
Dept: CARDIOLOGY | Facility: HOSPITAL | Age: 85
Discharge: HOME | End: 2025-01-29
Payer: MEDICARE

## 2025-01-29 DIAGNOSIS — Z95.0 PACEMAKER: ICD-10-CM

## 2025-01-29 DIAGNOSIS — R00.1 BRADYCARDIA: ICD-10-CM

## 2025-01-29 DIAGNOSIS — I49.5 SICK SINUS SYNDROME (MULTI): ICD-10-CM

## 2025-01-29 PROCEDURE — 93280 PM DEVICE PROGR EVAL DUAL: CPT | Performed by: INTERNAL MEDICINE

## 2025-01-29 PROCEDURE — 71046 X-RAY EXAM CHEST 2 VIEWS: CPT | Performed by: RADIOLOGY

## 2025-01-29 PROCEDURE — 71046 X-RAY EXAM CHEST 2 VIEWS: CPT

## 2025-01-29 PROCEDURE — 93280 PM DEVICE PROGR EVAL DUAL: CPT

## 2025-01-29 NOTE — PROGRESS NOTES
Patient has met target of no readmission for (90) days post discharge and is graduated from Transitional Care Management program at this time.   
No

## 2025-02-17 ENCOUNTER — APPOINTMENT (OUTPATIENT)
Dept: CARDIOLOGY | Facility: CLINIC | Age: 85
End: 2025-02-17
Payer: MEDICARE

## 2025-02-17 VITALS
HEIGHT: 63 IN | WEIGHT: 173 LBS | HEART RATE: 69 BPM | DIASTOLIC BLOOD PRESSURE: 82 MMHG | BODY MASS INDEX: 30.65 KG/M2 | SYSTOLIC BLOOD PRESSURE: 140 MMHG

## 2025-02-17 DIAGNOSIS — I51.7 LVH (LEFT VENTRICULAR HYPERTROPHY): ICD-10-CM

## 2025-02-17 DIAGNOSIS — I25.10 CAD S/P PERCUTANEOUS CORONARY ANGIOPLASTY: ICD-10-CM

## 2025-02-17 DIAGNOSIS — Z98.61 CAD S/P PERCUTANEOUS CORONARY ANGIOPLASTY: ICD-10-CM

## 2025-02-17 DIAGNOSIS — I10 BENIGN ESSENTIAL HYPERTENSION: ICD-10-CM

## 2025-02-17 DIAGNOSIS — I48.0 PAROXYSMAL ATRIAL FIBRILLATION (MULTI): ICD-10-CM

## 2025-02-17 DIAGNOSIS — Z87.891 FORMER SMOKER: ICD-10-CM

## 2025-02-17 DIAGNOSIS — I65.23 BILATERAL CAROTID ARTERY STENOSIS: ICD-10-CM

## 2025-02-17 DIAGNOSIS — E78.5 DYSLIPIDEMIA: ICD-10-CM

## 2025-02-17 PROCEDURE — 3079F DIAST BP 80-89 MM HG: CPT | Performed by: INTERNAL MEDICINE

## 2025-02-17 PROCEDURE — 99214 OFFICE O/P EST MOD 30 MIN: CPT | Performed by: INTERNAL MEDICINE

## 2025-02-17 PROCEDURE — 3077F SYST BP >= 140 MM HG: CPT | Performed by: INTERNAL MEDICINE

## 2025-02-17 PROCEDURE — 1159F MED LIST DOCD IN RCRD: CPT | Performed by: INTERNAL MEDICINE

## 2025-02-17 PROCEDURE — 1036F TOBACCO NON-USER: CPT | Performed by: INTERNAL MEDICINE

## 2025-02-17 NOTE — PROGRESS NOTES
CARDIOLOGY OFFICE VISIT      CHIEF COMPLAINT  Chief Complaint   Patient presents with    Follow-up     Patient is present for 4 month follow up.           HISTORY OF PRESENT ILLNESS  Patient is a 84-year-old  female with past medical history significant for coronary artery disease, status post non-ST elevation myocardial infarction, status post drug-eluting stents LAD and left circumflex 2019, cerebrovascular accident, hypertension, left ventricular hypertrophy, hyperlipidemia who presents for follow-up cardiovascular care.     Patient denies chest pain, dyspnea exertion, palpitations, nausea, vomiting.  Occasional lightheadedness without near syncope or esperanza syncope.  Denies edema or bleeding.  Patient has a chronic unsteady balance.  Patient does not exercise.  Currently drinks 32 ounces of water per day.  Patient was taken off of hydrochlorothiazide by Dr. Corrales.  Presumably related to symptoms of postural dizziness.        Past medical history:  As above plus  Gastroesophageal reflux disease  Nephrolithiasis  Optic migraines  Vertigo     Past surgical history:  Tonsillectomy  Right breast lumpectomy  D&C  Pacemaker     Social history:  Quit smoking age 58, smoked up to half a pack of cigarettes per day for approximately 30 years.  62 glasses of wine daily     Family history:  Mother  in her 60s with emphysema  Father  60s cardiac arrest perioperative     Review systems are negative, noncontributory, orders previously mentioned x12 systems.     I personally reviewed EKG 2019: Sinus bradycardia, nonspecific ST abnormality     Assessment:  Paroxysmal atrial fibrillation  Sinus node dysfunction s/p pacemaker  Coronary artery disease, status post non-ST elevation myocardial, NORBERT LAD and left circumflex 2019  Hypertension  Left ventricular hypertrophy  Hyperlipidemia  Hyperglycemia  Cerebrovascular accident 2019, cerebrovascular accident 2024-  Dr Rivera  Gastroesophageal reflux disease  Vertigo  Chronic unsteady balance  Tinnitis  Intolerance losartan due to dizziness  ACE inhibitor intolerance cough/hoarse voice  Intolerance to fenofibrate due to constipation     Recommendations:  Patient appears to be stable from a cardiac standpoint.  No symptoms of angina and no ischemia on most recent stress test.  No symptomatic arrhythmia.  No evidence of heart failure.  As mentioned above patient was taken off of hydrochlorothiazide presumably due to postural dizziness.  The symptoms have not changed.  She will follow-up with her neurologist for further evaluation.  I have advised the patient to maintain a blood pressure diary prior to office visits.  I encouraged her to increase her water intake to 64 ounces per day.  Patient may benefit from some formal physical therapy for her balance per neurology.  Follow-up with myself as scheduled in August.     Please excuse any errors in grammar or translation related to this dictation. Voice recognition software was utilized to prepare this document.    Recent Cardiovascular Testing:  The following results have been reviewed and updated.   Lab  10/23/23  ,HDL 60, LDL 53, Trig 157     Echo 6/24/19  1. EF 60-65%  2. 1+ AVR  3. Mild LVH  4. Moderate left atrial enlargement     24 hour HOlter 8/1/19  Sinus rhythm average 70 bpm  no cardiac arrhythmias       CRD: 7/28/20  1. Mild carotid artery stenosis.     MPL:8/12/24  1. Normal  2. EF 73%.     CTA head/neck 9/1/24  Mild bilateral ICA  Possible focal narrowing within a upper branch of the right PCA and  within the left anterior ZOILA.     14 Day Zio Patch 9/20/24  Paroxysmal afib/atrial flutter with ventricular rate  bpm  Sinus pauses with the longest duration 6 seconds  PST, max rate 179 bpm     MOSES 10/1/24  EF 60-65%  Mild aortic valve regurgitation  No left atrial thrombus  Bubble study is negative      VITALS  Vitals:    02/17/25 1315   BP: 140/82   Pulse: 69      Wt Readings from Last 4 Encounters:   25 78.5 kg (173 lb)   24 75.3 kg (166 lb)   10/24/24 76.2 kg (167 lb 15.9 oz)   10/17/24 76.1 kg (167 lb 11.2 oz)       PHYSICAL EXAM:  GENERAL:  Well developed, well nourished, in no acute distress.  CHEST:  Symmetric and nontender.  NEURO/PSYCH:  Alert and oriented times three with approppriate behavior and responses.  NECK:  Supple, no JVD, no bruit.  LUNGS:  Clear to auscultation bilaterally, normal respiratory effort.  HEART:  Rate and rhythm REGULAR with no evident murmur, no gallop appreciated.    There are no rubs, clicks or heaves.  EXTREMITIES:  Warm with good color, no clubbing or cyanosis.  There is no edema noted.  PERIPHERAL VASCULAR:  Pulses present and equally palpable; 2+ throughout.    ASSESSMENT AND PLAN  Problem List Items Addressed This Visit          Cardiac and Vasculature    Benign essential hypertension    Bilateral carotid artery stenosis    CAD S/P percutaneous coronary angioplasty    Dyslipidemia    LVH (left ventricular hypertrophy)    Paroxysmal atrial fibrillation (Multi)       Endocrine/Metabolic    BMI 30.0-30.9,adult       Tobacco    Former smoker       Past Medical History  Past Medical History:   Diagnosis Date    TIA (transient ischemic attack) 2024    Transient cerebral ischemic attack, unspecified     Mini stroke       Social History  Social History     Tobacco Use    Smoking status: Former     Current packs/day: 0.00     Types: Cigarettes     Quit date:      Years since quittin.1    Smokeless tobacco: Never   Vaping Use    Vaping status: Never Used   Substance Use Topics    Alcohol use: Yes     Comment: occasional    Drug use: Never       Family History   No family history on file.     Allergies:  Allergies   Allergen Reactions    Ace Inhibitors Cough        Outpatient Medications:  Current Outpatient Medications   Medication Instructions    amLODIPine (NORVASC) 10 mg, Daily    apixaban (ELIQUIS) 5 mg, oral, 2  times daily, Hold and resume with evening dose on 10/26/24    atorvastatin (LIPITOR) 40 mg, oral, Daily    clopidogrel (PLAVIX) 75 mg, oral, Daily    FLUoxetine (PROZAC) 40 mg, Daily    fluticasone (Flonase) 50 mcg/actuation nasal spray once daily as needed.    hydrALAZINE (APRESOLINE) 25 mg, oral, 2 times daily    metoprolol tartrate (LOPRESSOR) 25 mg, oral, 2 times daily    pantoprazole (PROTONIX) 40 mg, oral, Daily before breakfast, Do not crush, chew, or split.    potassium chloride CR 10 mEq ER tablet 10 mEq, 2 times daily    rivaroxaban (XARELTO) 20 mg, oral, Daily with evening meal, Take with food.    traZODone (DESYREL) 25 mg, Nightly        Recent Lab Results:    CMP:    Lab Results   Component Value Date     10/25/2024    K 3.3 (L) 10/25/2024     (H) 10/25/2024    CO2 25 10/25/2024    BUN 13 10/25/2024    CREATININE 0.84 10/25/2024    GLUCOSE 118 (H) 10/25/2024    CALCIUM 8.6 10/25/2024       Magnesium:    Lab Results   Component Value Date    MG 1.41 (L) 10/24/2024       Lipid Profile:    Lab Results   Component Value Date    TRIG 157 (H) 10/23/2023    HDL 60.4 10/23/2023    LDLCALC 53 10/23/2023    LDLDIRECT 73 07/17/2023       TSH:    Lab Results   Component Value Date    TSH 1.85 07/17/2023       BNP:   Lab Results   Component Value Date    BNP 81 10/24/2024        PT/INR:    Lab Results   Component Value Date    PROTIME 14.8 (H) 10/24/2024    INR 1.3 (H) 10/24/2024       HgBA1c:    Lab Results   Component Value Date    HGBA1C 6.2 (H) 09/28/2024       BMP:  Lab Results   Component Value Date     10/25/2024     10/24/2024     09/28/2024    K 3.3 (L) 10/25/2024    K 3.8 10/24/2024    K 3.8 09/28/2024     (H) 10/25/2024     10/24/2024     (H) 09/28/2024    CO2 25 10/25/2024    CO2 27 10/24/2024    CO2 27 09/28/2024    BUN 13 10/25/2024    BUN 17 10/24/2024    BUN 14 09/28/2024    CREATININE 0.84 10/25/2024    CREATININE 1.01 10/24/2024    CREATININE 0.96  09/28/2024       CBC:  Lab Results   Component Value Date    WBC 6.1 10/25/2024    WBC 8.4 10/24/2024    WBC 6.9 09/24/2024    RBC 4.57 10/25/2024    RBC 4.80 10/24/2024    RBC 4.80 09/24/2024    HGB 11.4 (L) 10/25/2024    HGB 12.2 10/24/2024    HGB 12.1 09/24/2024    HCT 37.4 10/25/2024    HCT 38.9 10/24/2024    HCT 40.5 09/24/2024    MCV 82 10/25/2024    MCV 81 10/24/2024    MCV 84 09/24/2024    MCH 24.9 (L) 10/25/2024    MCH 25.4 (L) 10/24/2024    MCH 25.2 (L) 09/24/2024    MCHC 30.5 (L) 10/25/2024    MCHC 31.4 (L) 10/24/2024    MCHC 29.9 (L) 09/24/2024    RDW 15.0 (H) 10/25/2024    RDW 14.9 (H) 10/24/2024    RDW 15.3 (H) 09/24/2024     10/25/2024     10/24/2024     09/24/2024       Cardiac Enzymes:    Lab Results   Component Value Date    TROPHS 3 10/24/2024    TROPHS <3 10/24/2024    TROPHS 4 08/31/2024       Hepatic Function Panel:    Lab Results   Component Value Date    ALKPHOS 104 10/24/2024    ALT 30 10/24/2024    AST 25 10/24/2024    PROT 6.4 10/24/2024    BILITOT 0.5 10/24/2024    BILIDIR 0.1 05/09/2022           Juwan Naqvi DO

## 2025-02-17 NOTE — PATIENT INSTRUCTIONS
Continue same medications and treatments.   Patient educated on proper medication use.   Patient educated on risk factor modification.   Please bring any lab results from other providers / physicians to your next appointment.     Please bring all medicines, vitamins, and herbal supplements with you when you come to the office.     Prescriptions will not be filled unless you are compliant with your follow up appointments or have a follow up appointment scheduled as per instruction of your physician. Refills should be requested at the time of your visit.  BP instructions  Keep August visit scheduled.

## 2025-02-19 ENCOUNTER — APPOINTMENT (OUTPATIENT)
Dept: CARDIOLOGY | Facility: CLINIC | Age: 85
End: 2025-02-19
Payer: MEDICARE

## 2025-02-19 ENCOUNTER — TELEPHONE (OUTPATIENT)
Dept: CARDIOLOGY | Facility: CLINIC | Age: 85
End: 2025-02-19

## 2025-02-26 ENCOUNTER — APPOINTMENT (OUTPATIENT)
Dept: CARDIOLOGY | Facility: CLINIC | Age: 85
End: 2025-02-26
Payer: MEDICARE

## 2025-02-26 VITALS
SYSTOLIC BLOOD PRESSURE: 118 MMHG | BODY MASS INDEX: 30.65 KG/M2 | WEIGHT: 173 LBS | HEIGHT: 63 IN | DIASTOLIC BLOOD PRESSURE: 74 MMHG | HEART RATE: 68 BPM

## 2025-02-26 DIAGNOSIS — Z95.0 PACEMAKER: ICD-10-CM

## 2025-02-26 DIAGNOSIS — I48.11 LONGSTANDING PERSISTENT ATRIAL FIBRILLATION (MULTI): ICD-10-CM

## 2025-02-26 DIAGNOSIS — I51.7 LVH (LEFT VENTRICULAR HYPERTROPHY): ICD-10-CM

## 2025-02-26 DIAGNOSIS — I48.0 PAROXYSMAL ATRIAL FIBRILLATION (MULTI): ICD-10-CM

## 2025-02-26 DIAGNOSIS — Z87.891 FORMER SMOKER: ICD-10-CM

## 2025-02-26 DIAGNOSIS — Z98.61 CAD S/P PERCUTANEOUS CORONARY ANGIOPLASTY: ICD-10-CM

## 2025-02-26 DIAGNOSIS — T78.40XA ALLERGY, INITIAL ENCOUNTER: Primary | ICD-10-CM

## 2025-02-26 DIAGNOSIS — I25.10 CAD S/P PERCUTANEOUS CORONARY ANGIOPLASTY: ICD-10-CM

## 2025-02-26 DIAGNOSIS — I10 BENIGN ESSENTIAL HYPERTENSION: ICD-10-CM

## 2025-02-26 PROCEDURE — 1159F MED LIST DOCD IN RCRD: CPT | Performed by: INTERNAL MEDICINE

## 2025-02-26 PROCEDURE — 93000 ELECTROCARDIOGRAM COMPLETE: CPT | Mod: DISTINCT PROCEDURAL SERVICE | Performed by: INTERNAL MEDICINE

## 2025-02-26 PROCEDURE — 3078F DIAST BP <80 MM HG: CPT | Performed by: INTERNAL MEDICINE

## 2025-02-26 PROCEDURE — 99215 OFFICE O/P EST HI 40 MIN: CPT | Performed by: INTERNAL MEDICINE

## 2025-02-26 PROCEDURE — 3074F SYST BP LT 130 MM HG: CPT | Performed by: INTERNAL MEDICINE

## 2025-02-26 PROCEDURE — 1036F TOBACCO NON-USER: CPT | Performed by: INTERNAL MEDICINE

## 2025-02-26 RX ORDER — LEVOCETIRIZINE DIHYDROCHLORIDE 5 MG/1
5 TABLET, FILM COATED ORAL EVERY EVENING
Qty: 90 TABLET | Refills: 3 | Status: SHIPPED | OUTPATIENT
Start: 2025-02-26 | End: 2026-02-26

## 2025-02-26 ASSESSMENT — ENCOUNTER SYMPTOMS
PALPITATIONS: 1
DYSPNEA ON EXERTION: 0

## 2025-02-26 NOTE — PATIENT INSTRUCTIONS
Continue same medications/treatment.  Patient educated on proper medication use.  Patient educated on risk factor modification.  Please bring any lab results from other providers/physicians to your next appointment.    Please bring all medicines, vitamins, and herbal supplements with you when you come to the office.    Prescriptions will not be filled unless you are compliant with your follow up appointments or have a follow up appointment scheduled as per instruction of your physician. Refills should be requested at the time of your visit.    Follow up with our physician assistant, Deidre, in 6 months with device check   Continue remote checks at 3 and 9 months    Gina REGALADO RN, AM SCRIBING FOR, AND IN THE PRESENCE OF DR. JUANCHO ALMANZAR MD

## 2025-02-26 NOTE — PROGRESS NOTES
CARDIOLOGY OFFICE VISIT      CHIEF COMPLAINT  Chief Complaint   Patient presents with    Follow-up     91 days with PMC        HISTORY OF PRESENT ILLNESS  HPI  84 y.o. female presenting with near syncope at [facility] October 25, 2024..     She has a past medical history of coronary artery disease, status post non-ST elevation myocardial infarction, status post drug-eluting stents LAD and left circumflex June 24, 2019, cerebrovascular accident, hypertension, left ventricular hypertrophy, hyperlipidemia patient was recently seen by cardiology service who recommended a Zio patch due to patient was complaining of frequent near syncopal episodes.     Holter monitor October 2024 shows episodes of sinus rhythm with atrial fibrillation with rapid ventricular response with pauses up to 9.7 seconds of duration.  Patient was called for evaluation.     In the emergency department EKG was consistent with sinus rhythm at a rate of 82 bpm QRS duration 62 ms QT corrected 441 ms.  Sodium 139 potassium 3.8 chloride 104 BUN 17 creatinine 1.01 ALT 30 AST 25 magnesium 1.41 BNP 81 troponin 5.3, subsequent troponin 3.  WBC 8.4 hemoglobin 12.2 hematocrit 38.9 platelet count 277     Holter monitor October 4, 2024  This is a Holter monitor for 14 days.     The underlying rhythm was sinus rhythm but there were episodes of atrial fibrillation during this study.  The burden of atrial fibrillation was 17% of the time.  The rates during atrial fibrillation were between  bpm.  Looking at the rhythm strips, there were episodes of pauses up to 9.7 seconds of duration with episodes of junctional rhythm.  Pauses during daytime hours when sleep time.     The minimum heart rate was 36 bpm, the maximal heart rate was 118 bpm with average heart rate of 59 bpm.     There were occasional isolated PVCs but no evidence of sustained ventricular arrhythmias     No significant symptoms were seen during this study     Conclusion     Underlying  rhythm sinus rhythm.     Episodes of atrial fibrillation rapid ventricular response seen during this study with burden of atrial fibrillation approximately 17% of the time.     Significant pauses were seen during study (17 episodes) up to 9.7 seconds of duration.  Clinical correlation is to be made     Holter monitor September 20, 2024  Procedure: 14-day event monitor     Date of study: September 20, 2024     Indications: Cerebrovascular accident     Report:  After removal of artifact Patient with 13 days, 23 hours of analysis time.  Baseline rhythm was normal sinus rhythm average rate 55 bpm, minimum rate sinus bradycardia 41 bpm at 5:26 AM, maximum rate 98 bpm.  There were 42 episodes of paroxysmal supraventricular tachycardia, longest duration 16 beats, maximum rate 179 bpm.  There are episodes of atrial fibrillation atrial flutter (5% burden) with heart rates ranging  bpm (average 95 bpm).  The longest episode of atrial fibrillation was 13 hours, 16 minutes.  There were 2 pauses longest duration 6 seconds.  Episodes of junctional rhythm were present.  There were rare premature atrial contractions, atrial couplets, atrial triplets.  Rare premature ventricular contractions, ventricular trigeminy.     Conclusions:  Paroxysmal atrial fibrillation/atrial flutter (5% burden) with ventricular rate  bpm.  Sinus pauses with the longest duration 6 seconds.  Paroxysmal supraventricular tachycardia, maximum rate 179 bpm  Baseline rhythm sinus bradycardia average rate 55 bpm, minimum rate 41 bpm.     Please excuse any errors in grammar or translation related to this dictation.  Voice recognition software was utilized to prepare this document.     Transesophageal echocardiogram October 1, 2024     CONCLUSIONS:   1. The left ventricular systolic function is normal, with a visually estimated ejection fraction of 60-65%.   2. Left ventricular diastolic filling was indeterminate.   3. There is normal right ventricular  global systolic function.   4. Mild aortic valve regurgitation.   5. No left atrial thrombus.   6. There is plaque visualized in the descending aorta.     Stress test 2024  IMPRESSION:  Normal Lexiscan Myoview cardiac perfusion stress test.  No evidence of ischemia or myocardial infarction by perfusion imaging.  Normal left ventricular systolic function, ejection fraction 73%.  Noninvasive risk stratification: Low risk.  Comparison study 2021 was negative for ischemia or  infarction. Calculated LV ejection fraction 83%.     Patient underwent implantation of a dual-chamber pacemaker in 2024 with no complications.    Since the last visit she states that she continues feeling dizziness with some pressure around the both maxillary areas and also orbital areas.  She denies any syncope.    Device interrogation today at the device clinic shows dual-chamber pacemaker Medtronic with battery Ingevity 16 years 6 months.  No evidence of high ventricular events but burden of atrial fibrillation approximately 4.9% of the time.    EKG performed today shows sinus rhythm at a rate of 68 bpm QRS duration 80 ms QT corrected 452 ms.  Rhythm strip shows the same pattern.    Patient is still on beta-blocker therapy and also Eliquis therapy.      Past Medical History  Past Medical History:   Diagnosis Date    TIA (transient ischemic attack) 2024    Transient cerebral ischemic attack, unspecified     Mini stroke       Social History  Social History     Tobacco Use    Smoking status: Former     Current packs/day: 0.00     Types: Cigarettes     Quit date:      Years since quittin.1    Smokeless tobacco: Never   Vaping Use    Vaping status: Never Used   Substance Use Topics    Alcohol use: Yes     Comment: occasional    Drug use: Never       Family History   No family history on file.     Allergies:  Allergies   Allergen Reactions    Ace Inhibitors Cough        Outpatient Medications:  Current Outpatient  Medications   Medication Instructions    amLODIPine (NORVASC) 10 mg, Daily    apixaban (ELIQUIS) 5 mg, oral, 2 times daily, Hold and resume with evening dose on 10/26/24    atorvastatin (LIPITOR) 40 mg, oral, Daily    clopidogrel (PLAVIX) 75 mg, oral, Daily    FLUoxetine (PROZAC) 40 mg, Daily    fluticasone (Flonase) 50 mcg/actuation nasal spray once daily as needed.    hydrALAZINE (APRESOLINE) 25 mg, oral, 2 times daily    metoprolol tartrate (LOPRESSOR) 25 mg, oral, 2 times daily    pantoprazole (PROTONIX) 40 mg, oral, Daily before breakfast, Do not crush, chew, or split.    potassium chloride CR 10 mEq ER tablet 10 mEq, 2 times daily    traZODone (DESYREL) 25 mg, Nightly          REVIEW OF SYSTEMS  Review of Systems   Cardiovascular:  Positive for palpitations. Negative for chest pain and dyspnea on exertion.   All other systems reviewed and are negative.        VITALS  Vitals:    02/26/25 1510   BP: 118/74   Pulse: 68       PHYSICAL EXAM  Constitutional:       General: Awake.      Appearance: Normal and healthy appearance. Well-developed, overweight and not in distress.   Neck:      Vascular: No JVR. JVD normal.   Pulmonary:      Effort: Pulmonary effort is normal.      Breath sounds: Normal breath sounds. No wheezing. No rhonchi. No rales.   Chest:      Chest wall: Not tender to palpatation.      Comments: Left sided device pocket- healed and well approximated. No swelling or hematoma      Cardiovascular:      PMI at left midclavicular line. Normal rate. Regular rhythm. Normal S1. Normal S2.       Murmurs: There is no murmur.      No gallop.  No click. No rub.   Pulses:     Intact distal pulses.   Edema:     Peripheral edema absent.   Abdominal:      Tenderness: There is no abdominal tenderness.   Musculoskeletal: Normal range of motion.         General: No tenderness. Skin:     General: Skin is warm and dry.   Neurological:      General: No focal deficit present.      Mental Status: Alert and oriented to  person, place and time.           ASSESSMENT AND PLAN  Impression     1.  Paroxysmal atrial fibrillation  2.  Sinus node dysfunction with significant pauses seen on Holter monitor  3.  History of coronary artery disease with multiple percutaneous coronary interventions, PCI of the LAD and circumflex in 2019  5.  Hypertension  6.  Hyperlipidemia  7.  History of CVA in 2019  8.  Vertigo  9.  Status post dual-chamber pacemaker implanted for sinus node dysfunction and pauses seen on Holter monitor in October 2024      Plan recommendations    Patient is doing well from the electrophysiologist on point.  Loris of atrial ration is still minimal.  She states that she is not too symptomatic during episodes of atrial fibrillation.  Will continue with observation for now.    Continue Eliquis therapy.    Regarding dizziness may be related with allergies.  We will prescribe levocetirizine 5 mg 1 tablet daily.    Follow device clinic as scheduled.    Follow my office every 6 months or sooner if needed.    Risk factor modification and lifestyle modification discussed with patient. Diet , exercise and hydration discussed with patient.    I have personally review with patient during this office visit, laboratory data, echocardiogram results, stress test results, Holter-event monitor results prior and after the last electrophysiology visit. All questions has been answered.    Please excuse any errors in grammar or translation related to this dictation.  Voice recognition software was utilized to prepare this document.

## 2025-03-10 ENCOUNTER — TELEPHONE (OUTPATIENT)
Dept: CARDIOLOGY | Facility: CLINIC | Age: 85
End: 2025-03-10
Payer: MEDICARE

## 2025-03-10 DIAGNOSIS — I10 BENIGN ESSENTIAL HYPERTENSION: Primary | ICD-10-CM

## 2025-03-10 NOTE — TELEPHONE ENCOUNTER
Patient called and stated she would like to go back on hydrochlorothiazide. Dr. Simon Corrales MD took her off it but she is unsure why and she stated when she last spoke to him, Dr. Juwan Naqvi, DO would have to put her back on it. She stated she has been having ankle swelling on occasion. Routed to Soraya CHRISTOPHER LPN

## 2025-03-11 RX ORDER — HYDROCHLOROTHIAZIDE 12.5 MG/1
12.5 TABLET ORAL DAILY
Qty: 90 TABLET | Refills: 3 | Status: SHIPPED | OUTPATIENT
Start: 2025-03-11 | End: 2026-03-11

## 2025-03-11 NOTE — TELEPHONE ENCOUNTER
Patient returned call and had hung up. I called patient back and left a detailed voicemail with recommendations. Will send new RX to [facility].

## 2025-03-17 ENCOUNTER — APPOINTMENT (OUTPATIENT)
Dept: NEUROLOGY | Facility: CLINIC | Age: 85
End: 2025-03-17
Payer: MEDICARE

## 2025-03-17 ENCOUNTER — TELEPHONE (OUTPATIENT)
Dept: CARDIOLOGY | Facility: CLINIC | Age: 85
End: 2025-03-17

## 2025-03-17 DIAGNOSIS — Z95.0 PACEMAKER: Primary | ICD-10-CM

## 2025-03-17 NOTE — TELEPHONE ENCOUNTER
"Patient called and LM stating she had a pacemaker placed in October and has since been feeling weak. She stated \"I just don't feel right.\" She stated she has been lightheaded. Routed to Gina BIRD RN   "

## 2025-03-18 ENCOUNTER — APPOINTMENT (OUTPATIENT)
Dept: NEUROLOGY | Facility: CLINIC | Age: 85
End: 2025-03-18
Payer: MEDICARE

## 2025-03-18 NOTE — TELEPHONE ENCOUNTER
In clinic device check ordered and sent to physician for signature. Will route to scheduling to make appointments.

## 2025-03-24 ENCOUNTER — APPOINTMENT (OUTPATIENT)
Dept: NEUROLOGY | Facility: CLINIC | Age: 85
End: 2025-03-24
Payer: MEDICARE

## 2025-03-26 ENCOUNTER — APPOINTMENT (OUTPATIENT)
Dept: CARDIOLOGY | Facility: CLINIC | Age: 85
End: 2025-03-26
Payer: MEDICARE

## 2025-03-26 ENCOUNTER — HOSPITAL ENCOUNTER (OUTPATIENT)
Dept: CARDIOLOGY | Facility: HOSPITAL | Age: 85
Discharge: HOME | End: 2025-03-26
Payer: MEDICARE

## 2025-03-26 VITALS
HEIGHT: 63 IN | SYSTOLIC BLOOD PRESSURE: 122 MMHG | HEART RATE: 75 BPM | WEIGHT: 170 LBS | DIASTOLIC BLOOD PRESSURE: 60 MMHG | BODY MASS INDEX: 30.12 KG/M2

## 2025-03-26 DIAGNOSIS — R00.1 BRADYCARDIA: ICD-10-CM

## 2025-03-26 DIAGNOSIS — I48.11 LONGSTANDING PERSISTENT ATRIAL FIBRILLATION (MULTI): ICD-10-CM

## 2025-03-26 DIAGNOSIS — Z98.61 CAD S/P PERCUTANEOUS CORONARY ANGIOPLASTY: ICD-10-CM

## 2025-03-26 DIAGNOSIS — Z95.0 PACEMAKER: ICD-10-CM

## 2025-03-26 DIAGNOSIS — I51.7 LVH (LEFT VENTRICULAR HYPERTROPHY): ICD-10-CM

## 2025-03-26 DIAGNOSIS — R53.1 WEAKNESS: ICD-10-CM

## 2025-03-26 DIAGNOSIS — R42 LIGHTHEADED: ICD-10-CM

## 2025-03-26 DIAGNOSIS — Z87.891 FORMER SMOKER: ICD-10-CM

## 2025-03-26 DIAGNOSIS — I48.0 PAROXYSMAL ATRIAL FIBRILLATION (MULTI): ICD-10-CM

## 2025-03-26 DIAGNOSIS — I25.10 CAD S/P PERCUTANEOUS CORONARY ANGIOPLASTY: ICD-10-CM

## 2025-03-26 DIAGNOSIS — I10 BENIGN ESSENTIAL HYPERTENSION: Primary | ICD-10-CM

## 2025-03-26 PROBLEM — E66.9 OBESITY: Status: RESOLVED | Noted: 2023-12-05 | Resolved: 2025-03-26

## 2025-03-26 PROCEDURE — 93280 PM DEVICE PROGR EVAL DUAL: CPT

## 2025-03-26 PROCEDURE — 1036F TOBACCO NON-USER: CPT | Performed by: INTERNAL MEDICINE

## 2025-03-26 PROCEDURE — 99215 OFFICE O/P EST HI 40 MIN: CPT | Performed by: INTERNAL MEDICINE

## 2025-03-26 PROCEDURE — 1159F MED LIST DOCD IN RCRD: CPT | Performed by: INTERNAL MEDICINE

## 2025-03-26 PROCEDURE — 93000 ELECTROCARDIOGRAM COMPLETE: CPT | Mod: DISTINCT PROCEDURAL SERVICE | Performed by: INTERNAL MEDICINE

## 2025-03-26 PROCEDURE — 3078F DIAST BP <80 MM HG: CPT | Performed by: INTERNAL MEDICINE

## 2025-03-26 PROCEDURE — 3074F SYST BP LT 130 MM HG: CPT | Performed by: INTERNAL MEDICINE

## 2025-03-26 PROCEDURE — 93280 PM DEVICE PROGR EVAL DUAL: CPT | Performed by: INTERNAL MEDICINE

## 2025-03-26 ASSESSMENT — ENCOUNTER SYMPTOMS
PALPITATIONS: 1
LIGHT-HEADEDNESS: 1
DYSPNEA ON EXERTION: 1

## 2025-03-26 NOTE — PATIENT INSTRUCTIONS
Follow up as scheduled in June with the device check  Take the allergy medicine prescribed at the last appointment  Continue same medications and treatments.   Patient educated on proper medication use.   Patient educated on risk factor modification.   Please bring any lab results from other providers / physicians to your next appointment.     Please bring all medicines, vitamins, and herbal supplements with you when you come to the office.     Prescriptions will not be filled unless you are compliant with your follow up appointments or have a follow up appointment scheduled as per instruction of your physician. Refills should be requested at the time of your visit.  CARLITOS REGALADO LPN, AM SCRIBING FOR, AND IN THE PRESENCE OF DR. JUANCHO ALMANZAR MD  I, Dr. Almanzar, personally performed the services described in the documentation as scribed by the nurse in my presence, and confirm it is both accurate and complete.

## 2025-03-26 NOTE — PROGRESS NOTES
CARDIOLOGY OFFICE VISIT      CHIEF COMPLAINT  Chief Complaint   Patient presents with    Follow-up     Pt is here today with c/o weakness, and device check        HISTORY OF PRESENT ILLNESS  HPI  84 y.o. female presenting with near syncope at [facility] October 25, 2024..     She has a past medical history of coronary artery disease, status post non-ST elevation myocardial infarction, status post drug-eluting stents LAD and left circumflex June 24, 2019, cerebrovascular accident, hypertension, left ventricular hypertrophy, hyperlipidemia patient was recently seen by cardiology service who recommended a Zio patch due to patient was complaining of frequent near syncopal episodes.     Holter monitor October 2024 shows episodes of sinus rhythm with atrial fibrillation with rapid ventricular response with pauses up to 9.7 seconds of duration.  Patient was called for evaluation.     In the emergency department EKG was consistent with sinus rhythm at a rate of 82 bpm QRS duration 62 ms QT corrected 441 ms.  Sodium 139 potassium 3.8 chloride 104 BUN 17 creatinine 1.01 ALT 30 AST 25 magnesium 1.41 BNP 81 troponin 5.3, subsequent troponin 3.  WBC 8.4 hemoglobin 12.2 hematocrit 38.9 platelet count 277     Holter monitor October 4, 2024  This is a Holter monitor for 14 days.     The underlying rhythm was sinus rhythm but there were episodes of atrial fibrillation during this study.  The burden of atrial fibrillation was 17% of the time.  The rates during atrial fibrillation were between  bpm.  Looking at the rhythm strips, there were episodes of pauses up to 9.7 seconds of duration with episodes of junctional rhythm.  Pauses during daytime hours when sleep time.     The minimum heart rate was 36 bpm, the maximal heart rate was 118 bpm with average heart rate of 59 bpm.     There were occasional isolated PVCs but no evidence of sustained ventricular arrhythmias     No significant symptoms were seen during this  study     Conclusion     Underlying rhythm sinus rhythm.     Episodes of atrial fibrillation rapid ventricular response seen during this study with burden of atrial fibrillation approximately 17% of the time.     Significant pauses were seen during study (17 episodes) up to 9.7 seconds of duration.  Clinical correlation is to be made     Holter monitor September 20, 2024  Procedure: 14-day event monitor     Date of study: September 20, 2024     Indications: Cerebrovascular accident     Report:  After removal of artifact Patient with 13 days, 23 hours of analysis time.  Baseline rhythm was normal sinus rhythm average rate 55 bpm, minimum rate sinus bradycardia 41 bpm at 5:26 AM, maximum rate 98 bpm.  There were 42 episodes of paroxysmal supraventricular tachycardia, longest duration 16 beats, maximum rate 179 bpm.  There are episodes of atrial fibrillation atrial flutter (5% burden) with heart rates ranging  bpm (average 95 bpm).  The longest episode of atrial fibrillation was 13 hours, 16 minutes.  There were 2 pauses longest duration 6 seconds.  Episodes of junctional rhythm were present.  There were rare premature atrial contractions, atrial couplets, atrial triplets.  Rare premature ventricular contractions, ventricular trigeminy.     Conclusions:  Paroxysmal atrial fibrillation/atrial flutter (5% burden) with ventricular rate  bpm.  Sinus pauses with the longest duration 6 seconds.  Paroxysmal supraventricular tachycardia, maximum rate 179 bpm  Baseline rhythm sinus bradycardia average rate 55 bpm, minimum rate 41 bpm.     Please excuse any errors in grammar or translation related to this dictation.  Voice recognition software was utilized to prepare this document.     Transesophageal echocardiogram October 1, 2024     CONCLUSIONS:   1. The left ventricular systolic function is normal, with a visually estimated ejection fraction of 60-65%.   2. Left ventricular diastolic filling was indeterminate.    3. There is normal right ventricular global systolic function.   4. Mild aortic valve regurgitation.   5. No left atrial thrombus.   6. There is plaque visualized in the descending aorta.     Stress test 2024  IMPRESSION:  Normal Lexiscan Myoview cardiac perfusion stress test.  No evidence of ischemia or myocardial infarction by perfusion imaging.  Normal left ventricular systolic function, ejection fraction 73%.  Noninvasive risk stratification: Low risk.  Comparison study 2021 was negative for ischemia or  infarction. Calculated LV ejection fraction 83%.     Patient underwent implantation of a dual-chamber pacemaker in 2024 with no complications.    Since the last office visit she has been feeling the same.  She has episodes of dizziness, lightheadedness, occasionally pain in the lower extremities.  Weakness.    EKG performed today shows atrial paced rhythm at a rate of 75 bpm QRS ration 70 ms QT corrected 422 ms.  Rhythm strip shows the same pattern.    Patient had a device interrogation today at the device clinic and she does have a dual-chamber pacemaker [employer] with battery longevity 13 years 4 months.  [place] of atrial fibrillation 14.6% of the time but no high ventricular events noted.      Past Medical History  Past Medical History:   Diagnosis Date    TIA (transient ischemic attack) 2024    Transient cerebral ischemic attack, unspecified     Mini stroke       Social History  Social History     Tobacco Use    Smoking status: Former     Current packs/day: 0.00     Types: Cigarettes     Quit date:      Years since quittin.2    Smokeless tobacco: Never   Vaping Use    Vaping status: Never Used   Substance Use Topics    Alcohol use: Yes     Comment: occasional    Drug use: Never       Family History   No family history on file.     Allergies:  Allergies   Allergen Reactions    Ace Inhibitors Cough        Outpatient Medications:  Current Outpatient Medications   Medication  Instructions    amLODIPine (NORVASC) 10 mg, Daily    apixaban (ELIQUIS) 5 mg, oral, 2 times daily    atorvastatin (LIPITOR) 40 mg, oral, Daily    clopidogrel (PLAVIX) 75 mg, oral, Daily    cyanocobalamin, vitamin B-12, (VITAMIN B-12 ORAL) Daily    FLUoxetine (PROZAC) 40 mg, Daily    fluticasone (Flonase) 50 mcg/actuation nasal spray once daily as needed.    hydrALAZINE (APRESOLINE) 25 mg, oral, 2 times daily    hydroCHLOROthiazide (MICROZIDE) 12.5 mg, oral, Daily    levocetirizine (XYZAL) 5 mg, oral, Every evening    metoprolol tartrate (LOPRESSOR) 25 mg, oral, 2 times daily    pantoprazole (PROTONIX) 40 mg, oral, Daily before breakfast, Do not crush, chew, or split.    potassium chloride CR 10 mEq ER tablet 10 mEq, 2 times daily    traZODone (DESYREL) 25 mg, Nightly          REVIEW OF SYSTEMS  Review of Systems   Cardiovascular:  Positive for dyspnea on exertion and palpitations. Negative for chest pain.   Neurological:  Positive for light-headedness.        Leg weakness         VITALS  Vitals:    03/26/25 1343   BP: 122/60   Pulse: 75       PHYSICAL EXAM  Constitutional:       Appearance: Normal and healthy appearance. Well-developed and not in distress. Obese.      Comments: Left sided device well healed   Neck:      Vascular: No JVR. JVD normal.   Pulmonary:      Effort: Pulmonary effort is normal.      Breath sounds: Normal breath sounds. No wheezing. No rhonchi. No rales.   Chest:      Chest wall: Not tender to palpatation.   Cardiovascular:      PMI at left midclavicular line. Normal rate. Irregularly irregular rhythm. Normal S1. Normal S2.       Murmurs: There is no murmur.      No gallop.  No click. No rub.   Pulses:     Intact distal pulses.   Edema:     Peripheral edema absent.   Abdominal:      Tenderness: There is no abdominal tenderness.   Musculoskeletal: Normal range of motion.         General: No tenderness. Skin:     General: Skin is warm and dry.   Neurological:      General: No focal deficit  present.      Mental Status: Alert and oriented to person, place and time.           ASSESSMENT AND PLAN  Impression     1.  Paroxysmal atrial fibrillation  2.  Sinus node dysfunction with significant pauses seen on Holter monitor  3.  History of coronary artery disease with multiple percutaneous coronary interventions, PCI of the LAD and circumflex in 2019  5.  Hypertension  6.  Hyperlipidemia  7.  History of CVA in 2019  8.  Vertigo  9.  Status post dual-chamber pacemaker implanted for sinus node dysfunction and pauses seen on Holter monitor in October 2024    Plan recommendations    I had a lengthy discussion with patient regarding findings of the device interrogation.  [place] of atrial fibrillation is still increasing.  She has also multiple symptoms that may not be related with atrial fibrillation like dizziness, lightheadedness, weakness, pain in the lower extremities.  Headaches.    Patient states that she would like to retry levocetirizine for possible allergies.    Regarding atrial fibrillation she would like to be kept in consideration for now.  She understood that the burden of atrial fibrillation may increase.  We discussed the option of rate control versus rhythm control strategy with medical therapy.  Continue Eliquis therapy    Follow device clinic as scheduled    Follow my office in 6 months or sooner needed    Risk factor modification and lifestyle modification discussed with patient. Diet , exercise and hydration discussed with patient.    I have personally review with patient during this office visit, laboratory data, echocardiogram results, stress test results, Holter-event monitor results prior and after the last electrophysiology visit. All questions has been answered.    Please excuse any errors in grammar or translation related to this dictation.  Voice recognition software was utilized to prepare this document.

## 2025-03-27 ENCOUNTER — TELEPHONE (OUTPATIENT)
Dept: CARDIOLOGY | Facility: CLINIC | Age: 85
End: 2025-03-27
Payer: MEDICARE

## 2025-03-27 NOTE — TELEPHONE ENCOUNTER
"Patient called office and was upset that she has been taking Plavix for years and could not get it refilled today. Per med list, Plavix was marked as \"med list cleanup\" by the provider at  with Dr. Simon Corrales MD yesterday. Patient stated she needs the medication as she has always taken it since her heart cath. Per Dr. Duke Walters MD since last stent was placed in 2019. Patient no longer needs to be on Plavix. Returned call to patient. No answer. VM full. Will try to call again tomorrow.   "

## 2025-04-28 DIAGNOSIS — I48.0 PAROXYSMAL ATRIAL FIBRILLATION (MULTI): ICD-10-CM

## 2025-04-28 RX ORDER — METOPROLOL TARTRATE 25 MG/1
25 TABLET, FILM COATED ORAL 2 TIMES DAILY
Qty: 180 TABLET | Refills: 3 | Status: SHIPPED | OUTPATIENT
Start: 2025-04-28 | End: 2026-04-28

## 2025-04-28 NOTE — TELEPHONE ENCOUNTER
Received request for prescription refill for patient.  Patient follows with Dr. Simon Corrales MD      Request is for metoprolol   Is patient currently on medication- yes    Last OV- 3/26/25  Next OV- 6/4/25    Pended for signing and sent to provider.

## 2025-05-12 DIAGNOSIS — E87.6 HYPOKALEMIA: ICD-10-CM

## 2025-05-12 RX ORDER — POTASSIUM CHLORIDE 750 MG/1
10 TABLET, EXTENDED RELEASE ORAL 2 TIMES DAILY
Qty: 180 TABLET | Refills: 3 | Status: SHIPPED | OUTPATIENT
Start: 2025-05-12 | End: 2026-05-12

## 2025-05-12 NOTE — TELEPHONE ENCOUNTER
Received request for prescription refills for patient.   Patient follows with Dr. Naqvi    Request is for potassium  Is patient currently on medication yes    Last OV 2/17/2025  Next OV 8/6/2025    Pended for signing and sent to provider

## 2025-06-04 ENCOUNTER — APPOINTMENT (OUTPATIENT)
Dept: CARDIOLOGY | Facility: HOSPITAL | Age: 85
End: 2025-06-04
Payer: MEDICARE

## 2025-06-04 ENCOUNTER — APPOINTMENT (OUTPATIENT)
Dept: CARDIOLOGY | Facility: CLINIC | Age: 85
End: 2025-06-04
Payer: MEDICARE

## 2025-06-23 ENCOUNTER — APPOINTMENT (OUTPATIENT)
Dept: NEUROLOGY | Facility: CLINIC | Age: 85
End: 2025-06-23
Payer: MEDICARE

## 2025-06-23 VITALS
SYSTOLIC BLOOD PRESSURE: 136 MMHG | HEIGHT: 62 IN | HEART RATE: 83 BPM | WEIGHT: 171 LBS | BODY MASS INDEX: 31.47 KG/M2 | DIASTOLIC BLOOD PRESSURE: 58 MMHG

## 2025-06-23 DIAGNOSIS — I63.311 CEREBROVASCULAR ACCIDENT (CVA) DUE TO THROMBOSIS OF RIGHT MIDDLE CEREBRAL ARTERY (MULTI): Primary | ICD-10-CM

## 2025-06-23 DIAGNOSIS — E78.5 DYSLIPIDEMIA: ICD-10-CM

## 2025-06-23 DIAGNOSIS — R42 DIZZINESS: ICD-10-CM

## 2025-06-23 PROCEDURE — 1160F RVW MEDS BY RX/DR IN RCRD: CPT | Performed by: PSYCHIATRY & NEUROLOGY

## 2025-06-23 PROCEDURE — 3078F DIAST BP <80 MM HG: CPT | Performed by: PSYCHIATRY & NEUROLOGY

## 2025-06-23 PROCEDURE — 3075F SYST BP GE 130 - 139MM HG: CPT | Performed by: PSYCHIATRY & NEUROLOGY

## 2025-06-23 PROCEDURE — 99214 OFFICE O/P EST MOD 30 MIN: CPT | Performed by: PSYCHIATRY & NEUROLOGY

## 2025-06-23 PROCEDURE — 1159F MED LIST DOCD IN RCRD: CPT | Performed by: PSYCHIATRY & NEUROLOGY

## 2025-06-23 PROCEDURE — 1036F TOBACCO NON-USER: CPT | Performed by: PSYCHIATRY & NEUROLOGY

## 2025-06-23 RX ORDER — VALACYCLOVIR HYDROCHLORIDE 1 G/1
1 TABLET, FILM COATED ORAL
COMMUNITY
Start: 2025-04-07

## 2025-06-23 RX ORDER — PANTOPRAZOLE SODIUM 20 MG/1
TABLET, DELAYED RELEASE ORAL
COMMUNITY
Start: 2025-04-06

## 2025-06-23 ASSESSMENT — ENCOUNTER SYMPTOMS
NECK STIFFNESS: 0
BRUISES/BLEEDS EASILY: 0
SHORTNESS OF BREATH: 0
FATIGUE: 0
COUGH: 0
TREMORS: 0
SLEEP DISTURBANCE: 0
FREQUENCY: 0
ADENOPATHY: 0
HALLUCINATIONS: 0
SEIZURES: 0
TROUBLE SWALLOWING: 0
ARTHRALGIAS: 0
FACIAL ASYMMETRY: 0
NAUSEA: 0
SPEECH DIFFICULTY: 0
NUMBNESS: 0
WHEEZING: 0
UNEXPECTED WEIGHT CHANGE: 0
ABDOMINAL PAIN: 0
VOMITING: 0
HYPERACTIVE: 0
SINUS PRESSURE: 0
JOINT SWELLING: 0
DIFFICULTY URINATING: 0
WEAKNESS: 0
PHOTOPHOBIA: 0
NECK PAIN: 0
EYE PAIN: 0
DIZZINESS: 1
LIGHT-HEADEDNESS: 0
PALPITATIONS: 0
FEVER: 0
AGITATION: 0
BACK PAIN: 0
CONFUSION: 0
HEADACHES: 0

## 2025-06-23 NOTE — PROGRESS NOTES
Stefany [place]  85 y.o.       SUBJECTIVE  Stefany is a 85-year-old young lady was seen today for follow-up of right MCA infarct with left hemiparesis and benign positional vertigo.  Since last seen she complains of dizziness but no new weakness numbness or any seizure or seizure-like activity.  She is currently on Eliquis and is being seen and followed by her cardiologist.  Today her physical and neurological unchanged but have advised her to continue the medication the way she is taking and have discussed the bleeding risk and the precaution as well as signs of the risk factor for CVA and to get any yearly hemoglobin A1c and lipid profile and if she has any symptoms and overall they do periodic ultrasound to look for any carotid stenosis on the right side.  I have discussed the planned course and the prognosis with her and her daughter with the understood and scheduled her to have follow-up with her PCP on a regular basis and to see us only as needed    I did review all her medication list.  Due to technical limitations of voice recognition and human error, this note may not accurately reflect the care of the patient.    Review of Systems   Constitutional:  Negative for fatigue, fever and unexpected weight change.   HENT:  Negative for dental problem, ear pain, hearing loss, sinus pressure, tinnitus and trouble swallowing.    Eyes:  Negative for photophobia, pain and visual disturbance.   Respiratory:  Negative for cough, shortness of breath and wheezing.    Cardiovascular:  Negative for chest pain, palpitations and leg swelling.   Gastrointestinal:  Negative for abdominal pain, nausea and vomiting.   Genitourinary:  Negative for difficulty urinating, enuresis and frequency.   Musculoskeletal:  Positive for gait problem. Negative for arthralgias, back pain, joint swelling, neck pain and neck stiffness.   Skin:  Negative for pallor and rash.   Allergic/Immunologic: Negative for food allergies.   Neurological:  Positive  "for dizziness. Negative for tremors, seizures, syncope, facial asymmetry, speech difficulty, weakness, light-headedness, numbness and headaches.   Hematological:  Negative for adenopathy. Does not bruise/bleed easily.   Psychiatric/Behavioral:  Negative for agitation, behavioral problems, confusion, hallucinations and sleep disturbance. The patient is not hyperactive.         Problem List[1]  Medical History[2]  Surgical History[3]    reports that she quit smoking about 26 years ago. Her smoking use included cigarettes. She has been exposed to tobacco smoke. She has never used smokeless tobacco. She reports current alcohol use. She reports that she does not currently use drugs.    /58 (BP Location: Left arm, Patient Position: Sitting, BP Cuff Size: Small adult)   Pulse 83   Ht 1.575 m (5' 2\")   Wt 77.6 kg (171 lb)   BMI 31.28 kg/m²     OBJECTIVE  Physical Exam/Neurological Exam     Constitutional: General appearance: no acute distress   Auscultation of Heart: Regular rate and rhythm, no murmurs, normal S1 and S2.   Carotid Arteries: Intact without any bruits.   Neck is supple.   No lymph adenopathy.   Peripheral Vascular Exam: Pulses +2 and equal in all extremities. No swelling, varicosities, edema or tenderness to palpations.   Abdomen is soft, nondistended. No organomegaly.  Mental status: The patient was in no distress, alert, interactive and cooperative. Affect is appropriate.   Orientation: oriented to person, oriented to place and oriented to time.   Memory: recent memory intact and remote memory intact.   Attention: normal attention span and normal concentrating ability.   Language: normal comprehension and no difficulty naming common objects.   Fund of knowledge: Patient displays adequate knowledge of current events, adequate fund of knowledge regarding past history and adequate fund of knowledge regarding vocabulary.   Eyes: The ophthalmoscopic examination was normal. The fundi are visualized with " normal disc margins and without.  Cranial nerve II: Visual fields full to confrontation.   Cranial nerves III, IV, and VI: Pupils round, equally reactive to light; no ptosis. EOMs intact. No nystagmus.   Cranial Nerve V: Facial sensation intact bilaterally.   Cranial nerve VII: Normal and symmetric facial strength.   Cranial nerve VIII: Hearing is intact bilaterally to finger rub / whisper.   Cranial nerves IX and X: Palate elevates symmetrically.   Cranial nerve XI: Shoulder shrug and neck rotation strength are intact.   Cranial nerve XII: Tongue midline with normal strength.   Motor: Motor exam was normal. Muscle bulk was normal in both upper and lower extremities. Muscle tone was normal in both upper and lower extremities. Muscle strength was 5/5 throughout. no abnormal or adventitious movements were present.   Reflexes revealed a slightly increased of from the left compared to the right fifth plantar extensor response understood.   Plantar Reflex: Toes downgoing to plantar stimulation on the left. Toes downgoing to plantar stimulation on the right.   Sensory Exam: Normal to light touch.   Coordination: There is no limb dystaxia and rapid alternating movements are intact.  Gait: Gait is ataxic and she was able to ambulate with the help of a cane    ASSESSMENT/PLAN  Diagnoses and all orders for this visit:  Cerebrovascular accident (CVA) due to thrombosis of right middle cerebral artery (Multi)  Dizziness  Dyslipidemia        Jimmy Rivera MD  6/23/2025  5:34 PM         [1]   Patient Active Problem List  Diagnosis    Benign essential hypertension    Benign paroxysmal positional vertigo    Bilateral carotid artery stenosis    Bilateral impacted cerumen    Bilateral sensorineural hearing loss    Bradycardia    CAD S/P percutaneous coronary angioplasty    Cervicalgia    Chronic rhinitis    CVA (cerebral vascular accident) (Multi)    Dyslipidemia    Ear pressure    Gastroesophageal reflux disease    Hyperlipidemia     Hypokalemia    Lightheaded    LVH (left ventricular hypertrophy)    Nonspecific abnormal results of liver function study    Postural hypotension    Presbycusis, bilateral    Tinnitus, bilateral    Vertigo    BMI 30.0-30.9,adult    Former smoker    Embolic stroke (Multi)    Paroxysmal atrial fibrillation (Multi)    Dizziness    Pacemaker    Weakness   [2]   Past Medical History:  Diagnosis Date    TIA (transient ischemic attack) 09/2024    Transient cerebral ischemic attack, unspecified     Mini stroke   [3]   Past Surgical History:  Procedure Laterality Date    CARDIAC ELECTROPHYSIOLOGY PROCEDURE Left 10/25/2024    Procedure: PPM IMPLANT DUAL;  Surgeon: Joss Moreno MD;  Location: [facility];  Service: Electrophysiology;  Laterality: Left;    OTHER SURGICAL HISTORY  06/21/2022    Breast biopsy    OTHER SURGICAL HISTORY  06/21/2022    Cardiac catheterization with stent placement    OTHER SURGICAL HISTORY  06/21/2022    Dermatological surgery    OTHER SURGICAL HISTORY  06/21/2022    Dilation and curettage    OTHER SURGICAL HISTORY  06/21/2022    Tonsillectomy    OTHER SURGICAL HISTORY  06/21/2022    Lithotripsy

## 2025-07-03 ENCOUNTER — HOSPITAL ENCOUNTER (OUTPATIENT)
Dept: CARDIOLOGY | Facility: HOSPITAL | Age: 85
Discharge: HOME | End: 2025-07-03
Payer: MEDICARE

## 2025-07-03 ENCOUNTER — HOSPITAL ENCOUNTER (OUTPATIENT)
Dept: LAB | Age: 85
Discharge: HOME | End: 2025-07-03
Payer: COMMERCIAL

## 2025-07-03 DIAGNOSIS — Z95.0 CARDIAC PACEMAKER IN SITU: ICD-10-CM

## 2025-07-03 DIAGNOSIS — I49.5 SICK SINUS SYNDROME (MULTI): ICD-10-CM

## 2025-07-03 LAB
ALBUMIN SERPL-MCNC: 3.9 G/DL (ref 3.5–4.6)
ALP SERPL-CCNC: 108 U/L (ref 40–130)
ALT SERPL-CCNC: 26 U/L (ref 0–33)
ANION GAP SERPL CALCULATED.3IONS-SCNC: 12 MEQ/L (ref 9–15)
ANISOCYTOSIS BLD QL SMEAR: ABNORMAL
AST SERPL-CCNC: 24 U/L (ref 0–35)
BASOPHILS # BLD: 0.1 K/UL (ref 0–0.2)
BASOPHILS NFR BLD: 0.7 %
BILIRUB SERPL-MCNC: 0.5 MG/DL (ref 0.2–0.7)
BUN SERPL-MCNC: 18 MG/DL (ref 8–23)
CALCIUM SERPL-MCNC: 8.6 MG/DL (ref 8.5–9.9)
CHLORIDE SERPL-SCNC: 104 MEQ/L (ref 95–107)
CHOLEST SERPL-MCNC: 136 MG/DL (ref 0–199)
CO2 SERPL-SCNC: 22 MEQ/L (ref 20–31)
CREAT SERPL-MCNC: 0.93 MG/DL (ref 0.5–0.9)
EOSINOPHIL # BLD: 0 K/UL (ref 0–0.7)
EOSINOPHIL NFR BLD: 0.3 %
ERYTHROCYTE [DISTWIDTH] IN BLOOD BY AUTOMATED COUNT: 16.9 % (ref 11.5–14.5)
GLOBULIN SER CALC-MCNC: 2.5 G/DL (ref 2.3–3.5)
GLUCOSE SERPL-MCNC: 151 MG/DL (ref 70–99)
HCT VFR BLD AUTO: 36.5 % (ref 37–47)
HDLC SERPL-MCNC: 53 MG/DL (ref 40–59)
HGB BLD-MCNC: 11.2 G/DL (ref 12–16)
HYPOCHROMIA BLD QL SMEAR: ABNORMAL
LDLC SERPL CALC-MCNC: 53 MG/DL (ref 0–129)
LYMPHOCYTES # BLD: 1.1 K/UL (ref 1–4.8)
LYMPHOCYTES NFR BLD: 15 %
MCH RBC QN AUTO: 23 PG (ref 27–31.3)
MCHC RBC AUTO-ENTMCNC: 30.7 % (ref 33–37)
MCV RBC AUTO: 74.9 FL (ref 79.4–94.8)
MICROCYTES BLD QL SMEAR: ABNORMAL
MONOCYTES # BLD: 0.6 K/UL (ref 0.2–0.8)
MONOCYTES NFR BLD: 7.9 %
NEUTROPHILS # BLD: 5.7 K/UL (ref 1.4–6.5)
NEUTS SEG NFR BLD: 75.8 %
OVALOCYTES BLD QL SMEAR: ABNORMAL
PLATELET # BLD AUTO: 330 K/UL (ref 130–400)
PLATELET BLD QL SMEAR: ADEQUATE
POIKILOCYTOSIS BLD QL SMEAR: ABNORMAL
POLYCHROMASIA BLD QL SMEAR: ABNORMAL
POTASSIUM SERPL-SCNC: 4.1 MEQ/L (ref 3.4–4.9)
PROT SERPL-MCNC: 6.4 G/DL (ref 6.3–8)
RBC # BLD AUTO: 4.87 M/UL (ref 4.2–5.4)
SLIDE REVIEW: ABNORMAL
SODIUM SERPL-SCNC: 138 MEQ/L (ref 135–144)
TRIGL SERPL-MCNC: 150 MG/DL (ref 0–150)
TSH SERPL-MCNC: 1.25 UIU/ML (ref 0.44–3.86)
WBC # BLD AUTO: 7.5 K/UL (ref 4.8–10.8)

## 2025-07-03 PROCEDURE — 80053 COMPREHEN METABOLIC PANEL: CPT

## 2025-07-03 PROCEDURE — 36415 COLL VENOUS BLD VENIPUNCTURE: CPT

## 2025-07-03 PROCEDURE — 85025 COMPLETE CBC W/AUTO DIFF WBC: CPT

## 2025-07-03 PROCEDURE — 93294 REM INTERROG EVL PM/LDLS PM: CPT | Performed by: INTERNAL MEDICINE

## 2025-07-03 PROCEDURE — 83036 HEMOGLOBIN GLYCOSYLATED A1C: CPT

## 2025-07-03 PROCEDURE — 93296 REM INTERROG EVL PM/IDS: CPT

## 2025-07-03 PROCEDURE — 80061 LIPID PANEL: CPT

## 2025-07-03 PROCEDURE — 84443 ASSAY THYROID STIM HORMONE: CPT

## 2025-07-03 PROCEDURE — 82306 VITAMIN D 25 HYDROXY: CPT

## 2025-07-03 PROCEDURE — 82607 VITAMIN B-12: CPT

## 2025-07-03 PROCEDURE — 82746 ASSAY OF FOLIC ACID SERUM: CPT

## 2025-07-04 LAB
ESTIMATED AVERAGE GLUCOSE: 131 MG/DL
FOLATE: 10.4 NG/ML (ref 4.8–24.2)
HBA1C MFR BLD: 6.2 % (ref 4–6)
VITAMIN B-12: 1367 PG/ML (ref 232–1245)
VITAMIN D 25-HYDROXY: 23.5 NG/ML (ref 30–100)

## 2025-07-07 ENCOUNTER — HOSPITAL ENCOUNTER (OUTPATIENT)
Dept: LAB | Age: 85
Discharge: HOME | End: 2025-07-07
Payer: MEDICARE

## 2025-07-07 LAB
CREAT UR-MCNC: 68.8 MG/DL
MICROALBUMIN UR-MCNC: 6.3 MG/DL
MICROALBUMIN/CREAT UR-RTO: 91.6 MG/G (ref 0–30)

## 2025-07-07 PROCEDURE — 82570 ASSAY OF URINE CREATININE: CPT

## 2025-07-07 PROCEDURE — 82043 UR ALBUMIN QUANTITATIVE: CPT

## 2025-07-28 ENCOUNTER — HOSPITAL ENCOUNTER (OUTPATIENT)
Dept: LAB | Age: 85
Discharge: HOME | End: 2025-07-28
Payer: MEDICARE

## 2025-07-28 ENCOUNTER — HOSPITAL ENCOUNTER (OUTPATIENT)
Dept: RADIOLOGY | Facility: HOSPITAL | Age: 85
Discharge: HOME | End: 2025-07-28
Payer: MEDICARE

## 2025-07-28 DIAGNOSIS — N39.0 URINARY TRACT INFECTION, SITE NOT SPECIFIED: ICD-10-CM

## 2025-07-28 DIAGNOSIS — R32 UNSPECIFIED URINARY INCONTINENCE: ICD-10-CM

## 2025-07-28 LAB
ANION GAP SERPL CALCULATED.3IONS-SCNC: 12 MEQ/L (ref 9–15)
BACTERIA URNS QL MICRO: ABNORMAL /HPF
BASOPHILS # BLD: 0.1 K/UL (ref 0–0.2)
BASOPHILS NFR BLD: 0.9 %
BILIRUB UR QL STRIP: NEGATIVE
BUN SERPL-MCNC: 15 MG/DL (ref 8–23)
CALCIUM SERPL-MCNC: 9.1 MG/DL (ref 8.5–9.9)
CHLORIDE SERPL-SCNC: 106 MEQ/L (ref 95–107)
CLARITY UR: ABNORMAL
CO2 SERPL-SCNC: 24 MEQ/L (ref 20–31)
COLOR UR: YELLOW
CREAT SERPL-MCNC: 0.94 MG/DL (ref 0.5–0.9)
EOSINOPHIL # BLD: 0 K/UL (ref 0–0.7)
EOSINOPHIL NFR BLD: 0.1 %
EPI CELLS #/AREA URNS AUTO: ABNORMAL /HPF (ref 0–5)
ERYTHROCYTE [DISTWIDTH] IN BLOOD BY AUTOMATED COUNT: 17.2 % (ref 11.5–14.5)
GLUCOSE SERPL-MCNC: 125 MG/DL (ref 70–99)
GLUCOSE UR STRIP-MCNC: NEGATIVE MG/DL
HCT VFR BLD AUTO: 38.6 % (ref 37–47)
HGB BLD-MCNC: 11.7 G/DL (ref 12–16)
HGB UR QL STRIP: NEGATIVE
HYALINE CASTS #/AREA URNS AUTO: ABNORMAL /HPF (ref 0–5)
KETONES UR STRIP-MCNC: NEGATIVE MG/DL
LEUKOCYTE ESTERASE UR QL STRIP: ABNORMAL
LYMPHOCYTES # BLD: 1.7 K/UL (ref 1–4.8)
LYMPHOCYTES NFR BLD: 19.3 %
MCH RBC QN AUTO: 23.4 PG (ref 27–31.3)
MCHC RBC AUTO-ENTMCNC: 30.3 % (ref 33–37)
MCV RBC AUTO: 77.4 FL (ref 79.4–94.8)
MONOCYTES # BLD: 1 K/UL (ref 0.2–0.8)
MONOCYTES NFR BLD: 10.8 %
NEUTROPHILS # BLD: 6.2 K/UL (ref 1.4–6.5)
NEUTS SEG NFR BLD: 68.7 %
NITRITE UR QL STRIP: POSITIVE
PH UR STRIP: 5 [PH] (ref 5–9)
PLATELET # BLD AUTO: 331 K/UL (ref 130–400)
POTASSIUM SERPL-SCNC: 4.4 MEQ/L (ref 3.4–4.9)
PROT UR STRIP-MCNC: NEGATIVE MG/DL
RBC # BLD AUTO: 4.99 M/UL (ref 4.2–5.4)
RBC #/AREA URNS AUTO: ABNORMAL /HPF (ref 0–5)
SODIUM SERPL-SCNC: 142 MEQ/L (ref 135–144)
SP GR UR STRIP: 1.02 (ref 1–1.03)
UROBILINOGEN UR STRIP-ACNC: 0.2 E.U./DL
WBC # BLD AUTO: 9 K/UL (ref 4.8–10.8)
WBC #/AREA URNS AUTO: >100 /HPF (ref 0–5)

## 2025-07-28 PROCEDURE — 87077 CULTURE AEROBIC IDENTIFY: CPT

## 2025-07-28 PROCEDURE — 85025 COMPLETE CBC W/AUTO DIFF WBC: CPT

## 2025-07-28 PROCEDURE — 81001 URINALYSIS AUTO W/SCOPE: CPT

## 2025-07-28 PROCEDURE — 76770 US EXAM ABDO BACK WALL COMP: CPT

## 2025-07-28 PROCEDURE — 87186 SC STD MICRODIL/AGAR DIL: CPT

## 2025-07-28 PROCEDURE — 76770 US EXAM ABDO BACK WALL COMP: CPT | Performed by: RADIOLOGY

## 2025-07-28 PROCEDURE — 80048 BASIC METABOLIC PNL TOTAL CA: CPT

## 2025-07-28 PROCEDURE — 36415 COLL VENOUS BLD VENIPUNCTURE: CPT

## 2025-07-28 PROCEDURE — 87086 URINE CULTURE/COLONY COUNT: CPT

## 2025-07-31 LAB
BACTERIA UR CULT: ABNORMAL
BACTERIA UR CULT: ABNORMAL
ORGANISM: ABNORMAL

## 2025-08-06 ENCOUNTER — APPOINTMENT (OUTPATIENT)
Dept: CARDIOLOGY | Facility: CLINIC | Age: 85
End: 2025-08-06
Payer: MEDICARE

## 2025-08-11 ENCOUNTER — APPOINTMENT (OUTPATIENT)
Dept: CARDIOLOGY | Facility: CLINIC | Age: 85
End: 2025-08-11
Payer: MEDICARE

## 2025-08-11 VITALS
WEIGHT: 172.2 LBS | DIASTOLIC BLOOD PRESSURE: 60 MMHG | SYSTOLIC BLOOD PRESSURE: 142 MMHG | BODY MASS INDEX: 31.5 KG/M2 | HEART RATE: 60 BPM

## 2025-08-11 DIAGNOSIS — I51.7 LVH (LEFT VENTRICULAR HYPERTROPHY): ICD-10-CM

## 2025-08-11 DIAGNOSIS — Z95.0 PACEMAKER: ICD-10-CM

## 2025-08-11 DIAGNOSIS — Z87.891 FORMER SMOKER: ICD-10-CM

## 2025-08-11 DIAGNOSIS — Z98.61 CAD S/P PERCUTANEOUS CORONARY ANGIOPLASTY: ICD-10-CM

## 2025-08-11 DIAGNOSIS — I10 BENIGN ESSENTIAL HYPERTENSION: ICD-10-CM

## 2025-08-11 DIAGNOSIS — I48.0 PAROXYSMAL ATRIAL FIBRILLATION (MULTI): ICD-10-CM

## 2025-08-11 DIAGNOSIS — I63.311 CEREBROVASCULAR ACCIDENT (CVA) DUE TO THROMBOSIS OF RIGHT MIDDLE CEREBRAL ARTERY (MULTI): ICD-10-CM

## 2025-08-11 DIAGNOSIS — R42 DIZZINESS: ICD-10-CM

## 2025-08-11 DIAGNOSIS — I25.10 CAD S/P PERCUTANEOUS CORONARY ANGIOPLASTY: ICD-10-CM

## 2025-08-11 DIAGNOSIS — E78.5 DYSLIPIDEMIA: ICD-10-CM

## 2025-08-11 DIAGNOSIS — K21.9 GASTROESOPHAGEAL REFLUX DISEASE, UNSPECIFIED WHETHER ESOPHAGITIS PRESENT: ICD-10-CM

## 2025-08-11 PROCEDURE — 3077F SYST BP >= 140 MM HG: CPT | Performed by: INTERNAL MEDICINE

## 2025-08-11 PROCEDURE — 99214 OFFICE O/P EST MOD 30 MIN: CPT | Performed by: INTERNAL MEDICINE

## 2025-08-11 PROCEDURE — 3078F DIAST BP <80 MM HG: CPT | Performed by: INTERNAL MEDICINE

## 2025-08-11 PROCEDURE — 1159F MED LIST DOCD IN RCRD: CPT | Performed by: INTERNAL MEDICINE

## 2025-08-11 RX ORDER — FEXOFENADINE HCL 60 MG/1
60 TABLET, FILM COATED ORAL DAILY
COMMUNITY

## 2025-08-22 DIAGNOSIS — I10 BENIGN ESSENTIAL HYPERTENSION: ICD-10-CM

## 2025-08-22 RX ORDER — HYDRALAZINE HYDROCHLORIDE 25 MG/1
25 TABLET, FILM COATED ORAL 2 TIMES DAILY
Qty: 180 TABLET | Refills: 3 | Status: SHIPPED | OUTPATIENT
Start: 2025-08-22

## 2025-08-27 ENCOUNTER — APPOINTMENT (OUTPATIENT)
Dept: CARDIOLOGY | Facility: HOSPITAL | Age: 85
End: 2025-08-27
Payer: MEDICARE

## 2025-08-27 ENCOUNTER — APPOINTMENT (OUTPATIENT)
Dept: CARDIOLOGY | Facility: CLINIC | Age: 85
End: 2025-08-27
Payer: MEDICARE

## 2025-09-11 ENCOUNTER — APPOINTMENT (OUTPATIENT)
Dept: CARDIOLOGY | Facility: CLINIC | Age: 85
End: 2025-09-11
Payer: MEDICARE

## 2026-08-10 ENCOUNTER — APPOINTMENT (OUTPATIENT)
Dept: CARDIOLOGY | Facility: CLINIC | Age: 86
End: 2026-08-10
Payer: MEDICARE

## (undated) DEVICE — SNARE ENDO 2.4MMX230CM -- COLD EXACTO

## (undated) DEVICE — SHIELD, RADPAD, ELECTROPHYSIOLOGY, ORANGE, ABSORBENT

## (undated) DEVICE — FLEX ADVANTAGE 1500CC: Brand: FLEX ADVANTAGE

## (undated) DEVICE — GUIDEWIRE, STRAIGHT TIP,  .035 DIA, 180 CM, 3 CM TIP"

## (undated) DEVICE — BASIN EMESIS 500CC ROSE 250/CS 60/PLT: Brand: MEDEGEN MEDICAL PRODUCTS, LLC

## (undated) DEVICE — CONTAINER PREFIL FRMLN 15ML --

## (undated) DEVICE — TRAP SPEC COLL POLYP POLYSTYR --

## (undated) DEVICE — DRESSING, AQUACEL AG, HYDROFIBER W/SILVER, 3.5 X 6 IN

## (undated) DEVICE — INTRODUCER SYSTEM, PRELUDE SNAP, SPLITTABLE, HEMOSTATIC, 7FR

## (undated) DEVICE — SYR 50ML SLIP TIP NSAF LF STRL --

## (undated) DEVICE — KIT COLON W/ 1.1OZ LUB AND 2 END

## (undated) DEVICE — MEDI-VAC NON-CONDUCTIVE SUCTION TUBING: Brand: CARDINAL HEALTH

## (undated) DEVICE — KENDALL 500 SERIES DIAPHORETIC FOAM MONITORING ELECTRODE - TEAR DROP SHAPE ( 30/PK): Brand: KENDALL